# Patient Record
Sex: FEMALE | Race: BLACK OR AFRICAN AMERICAN | NOT HISPANIC OR LATINO
[De-identification: names, ages, dates, MRNs, and addresses within clinical notes are randomized per-mention and may not be internally consistent; named-entity substitution may affect disease eponyms.]

---

## 2017-04-25 ENCOUNTER — APPOINTMENT (OUTPATIENT)
Dept: VASCULAR SURGERY | Facility: CLINIC | Age: 72
End: 2017-04-25

## 2017-04-25 VITALS
RESPIRATION RATE: 14 BRPM | SYSTOLIC BLOOD PRESSURE: 201 MMHG | DIASTOLIC BLOOD PRESSURE: 97 MMHG | OXYGEN SATURATION: 96 % | HEART RATE: 72 BPM

## 2018-04-24 ENCOUNTER — APPOINTMENT (OUTPATIENT)
Dept: VASCULAR SURGERY | Facility: CLINIC | Age: 73
End: 2018-04-24

## 2018-05-08 ENCOUNTER — APPOINTMENT (OUTPATIENT)
Dept: VASCULAR SURGERY | Facility: CLINIC | Age: 73
End: 2018-05-08
Payer: MEDICARE

## 2018-05-08 PROCEDURE — 99213 OFFICE O/P EST LOW 20 MIN: CPT | Mod: 25

## 2018-05-08 PROCEDURE — 93880 EXTRACRANIAL BILAT STUDY: CPT

## 2018-06-04 ENCOUNTER — APPOINTMENT (OUTPATIENT)
Dept: HEART AND VASCULAR | Facility: CLINIC | Age: 73
End: 2018-06-04
Payer: MEDICARE

## 2018-06-04 VITALS
WEIGHT: 181 LBS | DIASTOLIC BLOOD PRESSURE: 70 MMHG | OXYGEN SATURATION: 97 % | BODY MASS INDEX: 29.09 KG/M2 | SYSTOLIC BLOOD PRESSURE: 132 MMHG | HEIGHT: 66 IN | TEMPERATURE: 98.1 F | HEART RATE: 70 BPM

## 2018-06-04 PROCEDURE — 99214 OFFICE O/P EST MOD 30 MIN: CPT | Mod: 25

## 2018-06-04 PROCEDURE — 93306 TTE W/DOPPLER COMPLETE: CPT | Mod: XE

## 2018-06-04 PROCEDURE — 93000 ELECTROCARDIOGRAM COMPLETE: CPT

## 2019-02-05 ENCOUNTER — APPOINTMENT (OUTPATIENT)
Dept: HEART AND VASCULAR | Facility: CLINIC | Age: 74
End: 2019-02-05
Payer: MEDICARE

## 2019-02-05 PROCEDURE — 99213 OFFICE O/P EST LOW 20 MIN: CPT

## 2019-05-07 ENCOUNTER — APPOINTMENT (OUTPATIENT)
Dept: VASCULAR SURGERY | Facility: CLINIC | Age: 74
End: 2019-05-07
Payer: MEDICARE

## 2019-05-07 VITALS — HEART RATE: 70 BPM | OXYGEN SATURATION: 96 % | DIASTOLIC BLOOD PRESSURE: 68 MMHG | SYSTOLIC BLOOD PRESSURE: 137 MMHG

## 2019-05-07 PROCEDURE — 93880 EXTRACRANIAL BILAT STUDY: CPT

## 2019-05-07 PROCEDURE — 99213 OFFICE O/P EST LOW 20 MIN: CPT

## 2019-05-13 NOTE — PROCEDURE
[FreeTextEntry1] : Carotid duplex reveals stable R moderate ICA stenosis, 50-69%, with ICA/CCA PSV ratio of 2.5\par L ICA <50% stenosis, stable

## 2019-05-13 NOTE — REVIEW OF SYSTEMS
[As Noted in HPI] : as noted in HPI [Negative] : Psychiatric [Fever] : no fever [Chills] : no chills [Eyesight Problems] : no eyesight problems [Loss Of Hearing] : no hearing loss [Chest Pain] : no chest pain [Palpitations] : no palpitations [Leg Claudication] : no intermittent leg claudication [Shortness Of Breath] : no shortness of breath [Wheezing] : no wheezing [Abdominal Pain] : no abdominal pain [Vomiting] : no vomiting [Arthralgias] : no arthralgias [Joint Pain] : no joint pain [Limb Pain] : no limb pain [Muscle Weakness] : no muscle weakness

## 2019-05-13 NOTE — PHYSICAL EXAM
[Normal Thyroid] : the thyroid was normal [Normal Breath Sounds] : Normal breath sounds [Normal Heart Sounds] : normal heart sounds [Normal Rate and Rhythm] : normal rate and rhythm [2+] : left 2+ [Alert] : alert [No Rash or Lesion] : No rash or lesion [Calm] : calm [JVD] : no jugular venous distention  [Carotid Bruits] : no carotid bruits [Right Carotid Bruit] : no bruit heard over the right carotid [Left Carotid Bruit] : no bruit heard over the left carotid [de-identified] : Well-nourished, NAD [de-identified] : NC/AT, barbaraictalbert, EOMIx6 [de-identified] : FROM throughout, strength 5/5x4 [de-identified] : CNII-XII grossly intact, CITLALLI grossly intact

## 2019-05-13 NOTE — HISTORY OF PRESENT ILLNESS
[FreeTextEntry1] : 72 yo F with HLD, HTN, and DMT2 here for f/u carotid duplex. In 4/2016, pt had moderate stenosis, 50-69%, of R carotid. Since then, she has been feeling well and compliant with medications. \par \par No extremity weakness, no paresthesias, no facial droop, no slurred speech, no syncope, no vision/hearing issues.

## 2019-05-13 NOTE — ASSESSMENT
[FreeTextEntry1] : 71yoF w/stable b/l ICA stenosis, asymptomatic, no changes in medications/activity, will f/u in 6mos for surveillance.\par \par I personally discussed this patient with the Physician Assistant at the time of the visit. I agree with the assessment and plan as written

## 2019-11-05 ENCOUNTER — APPOINTMENT (OUTPATIENT)
Dept: VASCULAR SURGERY | Facility: CLINIC | Age: 74
End: 2019-11-05
Payer: MEDICARE

## 2019-11-05 VITALS
HEART RATE: 66 BPM | WEIGHT: 180 LBS | HEIGHT: 65 IN | SYSTOLIC BLOOD PRESSURE: 179 MMHG | TEMPERATURE: 98.7 F | DIASTOLIC BLOOD PRESSURE: 71 MMHG | OXYGEN SATURATION: 95 % | BODY MASS INDEX: 29.99 KG/M2

## 2019-11-05 PROCEDURE — 93880 EXTRACRANIAL BILAT STUDY: CPT

## 2019-11-05 PROCEDURE — 99213 OFFICE O/P EST LOW 20 MIN: CPT

## 2019-11-11 NOTE — HISTORY OF PRESENT ILLNESS
[FreeTextEntry1] : 72 yo F with HLD, HTN, and DMT2 here for f/u carotid duplex.  Pt was noted to have L <50% ICA stenosis and R 50-69% ICA stenosis, without symptoms in 05/2019. Since that time, she has been feeling well and compliant with medications. \par \par No extremity weakness, no paresthesias, no facial droop, no slurred speech, no syncope, no vision/hearing issues.

## 2019-11-11 NOTE — ASSESSMENT
[FreeTextEntry1] : 71yoF w/stable b/l ICA stenosis, asymptomatic, no changes in medications/activity.  Duplex today reveals no significant change from 05/2019 duplex, will f/u in 6mos for surveillance.\par \par I personally discussed this patient with the Physician Assistant at the time of the visit. I agree with the assessment and plan as written

## 2019-11-11 NOTE — PROCEDURE
[FreeTextEntry1] : Carotid duplex reveals stable R moderate ICA stenosis, 50-69%, L ICA <50% stenosis, stable

## 2019-11-11 NOTE — PHYSICAL EXAM
[Normal Thyroid] : the thyroid was normal [Normal Breath Sounds] : Normal breath sounds [Normal Heart Sounds] : normal heart sounds [Normal Rate and Rhythm] : normal rate and rhythm [2+] : left 2+ [No Rash or Lesion] : No rash or lesion [Alert] : alert [Calm] : calm [JVD] : no jugular venous distention  [Right Carotid Bruit] : no bruit heard over the right carotid [Carotid Bruits] : no carotid bruits [Left Carotid Bruit] : no bruit heard over the left carotid [de-identified] : Well-nourished, NAD [de-identified] : NC/AT, barbaraictalbert, EOMIx6 [de-identified] : FROM throughout, strength 5/5x4 [de-identified] : CNII-XII grossly intact, CITLALLI grossly intact

## 2020-11-02 ENCOUNTER — APPOINTMENT (OUTPATIENT)
Dept: INTERNAL MEDICINE | Facility: CLINIC | Age: 75
End: 2020-11-02
Payer: MEDICARE

## 2020-11-02 VITALS
HEART RATE: 78 BPM | HEIGHT: 65 IN | WEIGHT: 186 LBS | BODY MASS INDEX: 30.99 KG/M2 | TEMPERATURE: 97.9 F | OXYGEN SATURATION: 98 % | SYSTOLIC BLOOD PRESSURE: 156 MMHG | DIASTOLIC BLOOD PRESSURE: 66 MMHG

## 2020-11-02 DIAGNOSIS — Z12.39 ENCOUNTER FOR OTHER SCREENING FOR MALIGNANT NEOPLASM OF BREAST: ICD-10-CM

## 2020-11-02 PROCEDURE — 99072 ADDL SUPL MATRL&STAF TM PHE: CPT

## 2020-11-02 PROCEDURE — G0439: CPT | Mod: GC

## 2020-11-03 ENCOUNTER — APPOINTMENT (OUTPATIENT)
Dept: VASCULAR SURGERY | Facility: CLINIC | Age: 75
End: 2020-11-03
Payer: MEDICARE

## 2020-11-03 PROCEDURE — 99213 OFFICE O/P EST LOW 20 MIN: CPT

## 2020-11-03 PROCEDURE — 99072 ADDL SUPL MATRL&STAF TM PHE: CPT

## 2020-11-03 PROCEDURE — 93880 EXTRACRANIAL BILAT STUDY: CPT

## 2020-11-09 NOTE — HISTORY OF PRESENT ILLNESS
[FreeTextEntry1] : 76 yo F with HLD, HTN, and DMT2 here for f/u carotid duplex.  Pt was noted to have L <50% ICA stenosis and R 50-69% ICA stenosis, without symptoms in 05/2019. Since that time, she has been feeling well and compliant with medications. \par \par No extremity weakness, no paresthesias, no facial droop, no slurred speech, no syncope, no vision/hearing issues.

## 2020-11-09 NOTE — ASSESSMENT
[FreeTextEntry1] : 75yoF w/stable b/l ICA stenosis, asymptomatic, no changes in medications/activity.  Duplex today reveals no significant change from 05/2019 duplex, will f/u in 6mos for surveillance.\par \par I personally discussed this patient with the Physician Assistant at the time of the visit. I agree with the assessment and plan as written

## 2020-11-09 NOTE — PHYSICAL EXAM
[Normal Thyroid] : the thyroid was normal [Normal Breath Sounds] : Normal breath sounds [Normal Heart Sounds] : normal heart sounds [Normal Rate and Rhythm] : normal rate and rhythm [2+] : left 2+ [No Rash or Lesion] : No rash or lesion [Alert] : alert [Calm] : calm [JVD] : no jugular venous distention  [Carotid Bruits] : no carotid bruits [Right Carotid Bruit] : no bruit heard over the right carotid [Left Carotid Bruit] : no bruit heard over the left carotid [de-identified] : Well-nourished, NAD [de-identified] : NC/AT, barbaraictalbert, EOMIx6 [de-identified] : FROM throughout, strength 5/5x4 [de-identified] : CNII-XII grossly intact, CITLALLI grossly intact

## 2020-11-23 ENCOUNTER — APPOINTMENT (OUTPATIENT)
Dept: HEART AND VASCULAR | Facility: CLINIC | Age: 75
End: 2020-11-23
Payer: MEDICARE

## 2020-11-23 VITALS
HEIGHT: 65 IN | DIASTOLIC BLOOD PRESSURE: 70 MMHG | BODY MASS INDEX: 30.82 KG/M2 | HEART RATE: 96 BPM | TEMPERATURE: 97.3 F | WEIGHT: 185 LBS | OXYGEN SATURATION: 97 % | SYSTOLIC BLOOD PRESSURE: 140 MMHG

## 2020-11-23 PROCEDURE — 99215 OFFICE O/P EST HI 40 MIN: CPT

## 2020-11-23 PROCEDURE — 93306 TTE W/DOPPLER COMPLETE: CPT

## 2020-11-23 PROCEDURE — 93000 ELECTROCARDIOGRAM COMPLETE: CPT

## 2020-11-23 NOTE — DISCUSSION/SUMMARY
[FreeTextEntry1] : LINDER r/o CAD f/u for nuclear stress\par AS- mild by echo results discuss\par RBBB stable\par HTN stable on meds\par Lipids- continue statin

## 2020-11-23 NOTE — PHYSICAL EXAM
[General Appearance - Well Developed] : well developed [Normal Appearance] : normal appearance [Well Groomed] : well groomed [General Appearance - Well Nourished] : well nourished [No Deformities] : no deformities [General Appearance - In No Acute Distress] : no acute distress [Normal Conjunctiva] : the conjunctiva exhibited no abnormalities [Eyelids - No Xanthelasma] : the eyelids demonstrated no xanthelasmas [Normal Oral Mucosa] : normal oral mucosa [No Oral Pallor] : no oral pallor [No Oral Cyanosis] : no oral cyanosis [Normal Jugular Venous A Waves Present] : normal jugular venous A waves present [Normal Jugular Venous V Waves Present] : normal jugular venous V waves present [No Jugular Venous Juarez A Waves] : no jugular venous juarez A waves [Respiration, Rhythm And Depth] : normal respiratory rhythm and effort [Exaggerated Use Of Accessory Muscles For Inspiration] : no accessory muscle use [Auscultation Breath Sounds / Voice Sounds] : lungs were clear to auscultation bilaterally [Heart Rate And Rhythm] : heart rate and rhythm were normal [Heart Sounds] : normal S1 and S2 [Abdomen Soft] : soft [Abdomen Tenderness] : non-tender [Abdomen Mass (___ Cm)] : no abdominal mass palpated [Abnormal Walk] : normal gait [Gait - Sufficient For Exercise Testing] : the gait was sufficient for exercise testing [Nail Clubbing] : no clubbing of the fingernails [Cyanosis, Localized] : no localized cyanosis [Petechial Hemorrhages (___cm)] : no petechial hemorrhages [] : no ischemic changes [Oriented To Time, Place, And Person] : oriented to person, place, and time [Affect] : the affect was normal [Mood] : the mood was normal [No Anxiety] : not feeling anxious

## 2020-11-23 NOTE — HISTORY OF PRESENT ILLNESS
[FreeTextEntry1] : sent by pcp c/o new LINDER for past month, worse stairs and hills, denies chest pain, but needs to staop and rest\par known AS mild previously\par ECH with RBBB\par HTN bp controlled\par Lipid on statin

## 2020-11-23 NOTE — REASON FOR VISIT
[Follow-Up - Clinic] : a clinic follow-up of [Abnormal ECG] : an abnormal ECG [Aortic Stenosis] : aortic stenosis [Dyspnea] : dyspnea [Hyperlipidemia] : hyperlipidemia [Hypertension] : hypertension

## 2020-11-23 NOTE — REVIEW OF SYSTEMS
[see HPI] : see HPI [Shortness Of Breath] : shortness of breath [Joint Pain] : joint pain [Negative] : Heme/Lymph

## 2021-02-08 ENCOUNTER — APPOINTMENT (OUTPATIENT)
Dept: HEART AND VASCULAR | Facility: CLINIC | Age: 76
End: 2021-02-08
Payer: MEDICARE

## 2021-02-08 VITALS
WEIGHT: 183 LBS | DIASTOLIC BLOOD PRESSURE: 58 MMHG | HEIGHT: 65 IN | TEMPERATURE: 98.4 F | BODY MASS INDEX: 30.49 KG/M2 | SYSTOLIC BLOOD PRESSURE: 140 MMHG | HEART RATE: 74 BPM | OXYGEN SATURATION: 97 %

## 2021-02-08 DIAGNOSIS — R94.31 ABNORMAL ELECTROCARDIOGRAM [ECG] [EKG]: ICD-10-CM

## 2021-02-08 PROCEDURE — 93321 DOPPLER ECHO F-UP/LMTD STD: CPT

## 2021-02-08 PROCEDURE — 99072 ADDL SUPL MATRL&STAF TM PHE: CPT

## 2021-02-08 PROCEDURE — 93325 DOPPLER ECHO COLOR FLOW MAPG: CPT

## 2021-02-08 PROCEDURE — 99215 OFFICE O/P EST HI 40 MIN: CPT

## 2021-02-08 PROCEDURE — 93350 STRESS TTE ONLY: CPT

## 2021-02-08 NOTE — PHYSICAL EXAM
[General Appearance - Well Developed] : well developed [Normal Appearance] : normal appearance [Well Groomed] : well groomed [General Appearance - Well Nourished] : well nourished [No Deformities] : no deformities [General Appearance - In No Acute Distress] : no acute distress [Eyelids - No Xanthelasma] : the eyelids demonstrated no xanthelasmas [Normal Conjunctiva] : the conjunctiva exhibited no abnormalities [Normal Oral Mucosa] : normal oral mucosa [No Oral Pallor] : no oral pallor [No Oral Cyanosis] : no oral cyanosis [Normal Jugular Venous A Waves Present] : normal jugular venous A waves present [Normal Jugular Venous V Waves Present] : normal jugular venous V waves present [No Jugular Venous Juarez A Waves] : no jugular venous juarez A waves [Respiration, Rhythm And Depth] : normal respiratory rhythm and effort [Exaggerated Use Of Accessory Muscles For Inspiration] : no accessory muscle use [Auscultation Breath Sounds / Voice Sounds] : lungs were clear to auscultation bilaterally [Heart Rate And Rhythm] : heart rate and rhythm were normal [Heart Sounds] : normal S1 and S2 [Abdomen Soft] : soft [Abdomen Tenderness] : non-tender [Abdomen Mass (___ Cm)] : no abdominal mass palpated [Gait - Sufficient For Exercise Testing] : the gait was sufficient for exercise testing [Nail Clubbing] : no clubbing of the fingernails [Cyanosis, Localized] : no localized cyanosis [Petechial Hemorrhages (___cm)] : no petechial hemorrhages [] : no ischemic changes [Oriented To Time, Place, And Person] : oriented to person, place, and time [Affect] : the affect was normal [Mood] : the mood was normal [No Anxiety] : not feeling anxious

## 2021-02-08 NOTE — HISTORY OF PRESENT ILLNESS
[FreeTextEntry1] : abnormal ecg- rbbb\par as/sob- for stress echo\par HTn bp controlled\par Lipids on statin

## 2021-02-18 ENCOUNTER — APPOINTMENT (OUTPATIENT)
Dept: INTERNAL MEDICINE | Facility: CLINIC | Age: 76
End: 2021-02-18

## 2021-02-26 ENCOUNTER — APPOINTMENT (OUTPATIENT)
Dept: INTERNAL MEDICINE | Facility: CLINIC | Age: 76
End: 2021-02-26
Payer: MEDICARE

## 2021-02-26 VITALS
BODY MASS INDEX: 30.49 KG/M2 | SYSTOLIC BLOOD PRESSURE: 150 MMHG | HEART RATE: 76 BPM | OXYGEN SATURATION: 98 % | RESPIRATION RATE: 15 BRPM | WEIGHT: 183 LBS | HEIGHT: 65 IN | TEMPERATURE: 97.7 F | DIASTOLIC BLOOD PRESSURE: 53 MMHG

## 2021-02-26 PROCEDURE — 99214 OFFICE O/P EST MOD 30 MIN: CPT | Mod: GC

## 2021-02-26 PROCEDURE — 99072 ADDL SUPL MATRL&STAF TM PHE: CPT

## 2021-07-09 ENCOUNTER — APPOINTMENT (OUTPATIENT)
Dept: INTERNAL MEDICINE | Facility: CLINIC | Age: 76
End: 2021-07-09

## 2021-07-19 ENCOUNTER — APPOINTMENT (OUTPATIENT)
Dept: INTERNAL MEDICINE | Facility: CLINIC | Age: 76
End: 2021-07-19
Payer: MEDICARE

## 2021-07-19 VITALS
WEIGHT: 184 LBS | BODY MASS INDEX: 30.66 KG/M2 | OXYGEN SATURATION: 99 % | TEMPERATURE: 97.6 F | HEART RATE: 74 BPM | HEIGHT: 65 IN | SYSTOLIC BLOOD PRESSURE: 151 MMHG | DIASTOLIC BLOOD PRESSURE: 61 MMHG

## 2021-07-19 DIAGNOSIS — S66.912A STRAIN OF UNSPECIFIED MUSCLE, FASCIA AND TENDON AT WRIST AND HAND LEVEL, LEFT HAND, INITIAL ENCOUNTER: ICD-10-CM

## 2021-07-19 PROCEDURE — 99214 OFFICE O/P EST MOD 30 MIN: CPT | Mod: GC

## 2021-11-02 ENCOUNTER — APPOINTMENT (OUTPATIENT)
Dept: VASCULAR SURGERY | Facility: CLINIC | Age: 76
End: 2021-11-02

## 2022-04-14 ENCOUNTER — APPOINTMENT (OUTPATIENT)
Dept: INTERNAL MEDICINE | Facility: CLINIC | Age: 77
End: 2022-04-14
Payer: MEDICARE

## 2022-04-14 VITALS
WEIGHT: 181 LBS | SYSTOLIC BLOOD PRESSURE: 157 MMHG | HEIGHT: 65 IN | OXYGEN SATURATION: 97 % | RESPIRATION RATE: 16 BRPM | BODY MASS INDEX: 30.16 KG/M2 | DIASTOLIC BLOOD PRESSURE: 66 MMHG | TEMPERATURE: 97.6 F | HEART RATE: 74 BPM

## 2022-04-14 DIAGNOSIS — K21.9 GASTRO-ESOPHAGEAL REFLUX DISEASE W/OUT ESOPHAGITIS: ICD-10-CM

## 2022-04-14 DIAGNOSIS — Z00.00 ENCOUNTER FOR GENERAL ADULT MEDICAL EXAMINATION W/OUT ABNORMAL FINDINGS: ICD-10-CM

## 2022-04-14 PROCEDURE — 99214 OFFICE O/P EST MOD 30 MIN: CPT | Mod: GC

## 2022-04-19 ENCOUNTER — APPOINTMENT (OUTPATIENT)
Dept: VASCULAR SURGERY | Facility: CLINIC | Age: 77
End: 2022-04-19
Payer: MEDICARE

## 2022-04-19 VITALS
WEIGHT: 181 LBS | BODY MASS INDEX: 30.16 KG/M2 | HEIGHT: 65 IN | SYSTOLIC BLOOD PRESSURE: 153 MMHG | HEART RATE: 56 BPM | DIASTOLIC BLOOD PRESSURE: 81 MMHG

## 2022-04-19 PROCEDURE — 93880 EXTRACRANIAL BILAT STUDY: CPT

## 2022-04-19 PROCEDURE — 99214 OFFICE O/P EST MOD 30 MIN: CPT

## 2022-04-23 NOTE — HISTORY OF PRESENT ILLNESS
[FreeTextEntry1] : 76yoF w/h/o HLD, HTN, and DMT2 here for f/u carotid duplex.  Pt was previously noted to have L <50% ICA stenosis and R 50-69% ICA stenosis, never demonstrating symptoms. Since that time, she has been feeling well and compliant with medications. \par \par No extremity weakness, no paresthesias, no facial droop, no slurred speech, no syncope, no vision/hearing issues.

## 2022-04-23 NOTE — ASSESSMENT
[FreeTextEntry1] : 76yoF w/h/o HLD, HTN, and DMT2 here for f/u carotid duplex.  Pt was previously noted to have L <50% ICA stenosis and R 50-69% ICA stenosis, never demonstrating symptoms. Since that time, she has been feeling well and compliant with medications. \par Carotid duplex today reveals progression of  R ICA stenosis to 70% (369/86).\par Patient was explained that her right carotid stenosis significantly progressed over last year and a half and needs intervention.\par She was recommended to see her cardiologist, Dr. Barron for an evaluation prior to carotid intervention.\par Once she is cleared we recommend to proceed with Carotid Endarterectomy vs TCAR procedure. RAB were discussed. Patient understands and agrees, and will schedule an appointment with her cardiologist.\par \par

## 2022-04-23 NOTE — PROCEDURE
[FreeTextEntry1] : Carotid duplex reveals progression of  R ICA stenosis to 70% (369/86), previously 11/2020 195/39; L ICA <50% stenosis, stable.

## 2022-04-23 NOTE — REVIEW OF SYSTEMS
[Fever] : no fever [Chills] : no chills [Eyesight Problems] : no eyesight problems [Loss Of Hearing] : no hearing loss [Chest Pain] : no chest pain [Palpitations] : no palpitations [Leg Claudication] : no intermittent leg claudication [Shortness Of Breath] : no shortness of breath [Wheezing] : no wheezing [Abdominal Pain] : no abdominal pain [Vomiting] : no vomiting [Arthralgias] : no arthralgias [Limb Pain] : no limb pain [Muscle Weakness] : no muscle weakness

## 2022-04-23 NOTE — PHYSICAL EXAM
[Carotid Bruits] : carotid bruit  [Right Carotid Bruit] : right carotid bruit heard [JVD] : no jugular venous distention  [Left Carotid Bruit] : no bruit heard over the left carotid [de-identified] : Well-nourished, NAD [de-identified] : NC/AT, barbaraictalbert, EOMIx6 [de-identified] : FROM throughout, strength 5/5x4 [de-identified] : CNII-XII grossly intact, CITLALLI grossly intact

## 2022-04-23 NOTE — ADDENDUM
[FreeTextEntry1] : This note was written by Brii GO, acting as a scribe for Dr. Madhuri Felipe.  I, Dr. Madhuri Felipe, have read and attest that all the information, medical decision-making, and discharge instructions within are true and accurate.\par \par I, Dr. Madhuri Felipe, personally performed the evaluation and management (E/M) services for this established patient who presents today with (a) new problem(s)/exacerbation of (an) existing condition(s).  That E/M includes conducting the examination, assessing all new/exacerbated conditions, and establishing a new plan of care.  Today, my ACP, Brii GO, was here to observe my evaluation and management services for this new problem/exacerbated condition to be followed going forward.\par \par \par

## 2022-07-11 ENCOUNTER — APPOINTMENT (OUTPATIENT)
Dept: INTERNAL MEDICINE | Facility: CLINIC | Age: 77
End: 2022-07-11

## 2022-07-11 VITALS
HEART RATE: 99 BPM | WEIGHT: 176 LBS | BODY MASS INDEX: 29.32 KG/M2 | SYSTOLIC BLOOD PRESSURE: 162 MMHG | RESPIRATION RATE: 16 BRPM | DIASTOLIC BLOOD PRESSURE: 82 MMHG | OXYGEN SATURATION: 100 % | HEIGHT: 65 IN

## 2022-07-11 DIAGNOSIS — E65 LOCALIZED ADIPOSITY: ICD-10-CM

## 2022-07-11 PROCEDURE — 99215 OFFICE O/P EST HI 40 MIN: CPT | Mod: GC

## 2022-07-12 PROBLEM — E65 AXILLARY FAT PAD: Status: ACTIVE | Noted: 2022-04-14

## 2022-07-12 NOTE — END OF VISIT
[] : Resident [FreeTextEntry3] : Here for follow up\par Axillary fat pad- notes that it is still painful, will obtain US as discussed last visit \par L eye blurry vision- rec ophtho eval, cannot rule out worsening carotid disease, plan for CEA, needs cards optimization, will make appt\par DM- managed by nephro, had labs she brought in with her which show A1C increased to 8.6, rec increasing glipizide-metformin , will discuss with her nephrologist \par HTN- Still above goal, compliant with medications, wants to discuss with her nephrologist re bp management

## 2022-07-12 NOTE — PHYSICAL EXAM
[No Edema] : there was no peripheral edema [Normal] : affect was normal and insight and judgment were intact [de-identified] : Bilateral axillary fat pads, right larger than left. No erythema, fluctuance. No tenderness to palpation or masses palpable.

## 2022-07-12 NOTE — ASSESSMENT
[FreeTextEntry1] : \par 76F w/ PMHx HTN, HLD, DM2, CKD3 (f/w nephrology Dr. Pearson at [facility]), and right ICA stenosis (70%, pending CEA vs. TCAR), presents for 3-month follow-up visit, complaining of 3-month h/o continued right-sided axillary fat pad pain and new 2-month h/o left eye blurry vision. \par \par #Right axillary fat pad\par - H/o bilateral axillary fat pads for over 40 years according to patient but just a few months ago the right side started to be painful, especially when walking her dog\par - Stable since last visit 3 months ago\par - Ordered bilateral axillary ultrasound\par \par #Left eye blurry vision\par - Has worn glasses since childhood, but 2 months ago she noticed her left eye vision was more blurry, both near and far sight\par - Cannot remember timing of her last optometry exam\par - No eye pain, no floaters, no black out episodes. No history of amaurosis fugax. \par - Unclear cause, must rule out carotid artery cause of worsening vision from plaque blockage\par - Pending cardiology pre-op clearance before CEA vs. TCAR for right ICA stenosis of 70%\par - Recommend optometry exam to rule out retinal pathology\par \par #Right ICA stenosis\par - Cardiology referral for pre-op clearance for CEA vs. TCAR with vascular surgery\par - Blurry vision in left eye could be related\par - Otherwise asymptomatic stenosis\par \par #HTN\par - BP continues to be uncontrolled, 162/90 today\par - Compliant with her 5 bp medications: amlodipine 10, atenolol 50, lasix 40, losartan 100, and terazosin 10\par - Patient is physically active, going for walks 3 times per day and riding her stationary bike at home.\par - Does not check bp at home\par - Bp medications managed by nephrologist at [facility]\par - Recommend switching lasix to HCTZ\par \par #DM2\par - Compliant with home glipizide-metformin 2.5-500 BID and levemir 30u qhs\par - Medications managed by nephrologist at [facility]\par - Recommend increasing glipizide-metformin to 5-1000 BID\par \par #HLD\par - C/w home fenofibrate 145 and lovastatin 40

## 2022-07-12 NOTE — HISTORY OF PRESENT ILLNESS
[de-identified] : 76F w/ PMHx HTN, HLD, DM2, CKD3 (f/w nephrology Dr. Pearson at [facility]), and right carotid stenosis (70%, pending CEA vs. TCAR), presents for 3-month follow-up visit, complaining of continued right-side axillary fat pad pain and new 2-month h/o left eye blurry vision. Patient has been staying physically active, going for walks 3 times per day and riding her stationary bike at home. Reports good compliance with her blood pressure, cholesterol, and diabetes medications. \par \par Right axillary fat pad- She has had bilateral axillary fat pads for over 40 years according to patient but just a few months ago the right side started to be painful, especially when walking her dog. Has not increased in size or pain since last visit 3 months ago.\par \par Left eye blurry vision- has worn glasses since childhood, but 2 months ago she noticed her left eye vision was more blurry, both near and far. Cannot remember when her last optometry exam was. No eye pain, no floaters, no black out episodes. No history of amaurosis fugax.

## 2022-08-04 ENCOUNTER — APPOINTMENT (OUTPATIENT)
Dept: ULTRASOUND IMAGING | Facility: HOSPITAL | Age: 77
End: 2022-08-04

## 2022-08-04 ENCOUNTER — RESULT REVIEW (OUTPATIENT)
Age: 77
End: 2022-08-04

## 2022-08-04 ENCOUNTER — OUTPATIENT (OUTPATIENT)
Dept: OUTPATIENT SERVICES | Facility: HOSPITAL | Age: 77
LOS: 1 days | End: 2022-08-04
Payer: COMMERCIAL

## 2022-08-04 PROCEDURE — 76642 ULTRASOUND BREAST LIMITED: CPT | Mod: 26,50

## 2022-08-04 PROCEDURE — 76642 ULTRASOUND BREAST LIMITED: CPT

## 2022-08-18 ENCOUNTER — OUTPATIENT (OUTPATIENT)
Dept: OUTPATIENT SERVICES | Facility: HOSPITAL | Age: 77
LOS: 1 days | End: 2022-08-18
Payer: COMMERCIAL

## 2022-08-18 ENCOUNTER — RESULT REVIEW (OUTPATIENT)
Age: 77
End: 2022-08-18

## 2022-08-18 ENCOUNTER — APPOINTMENT (OUTPATIENT)
Dept: MAMMOGRAPHY | Facility: HOSPITAL | Age: 77
End: 2022-08-18

## 2022-08-18 PROCEDURE — 76642 ULTRASOUND BREAST LIMITED: CPT | Mod: 26,RT

## 2022-08-18 PROCEDURE — 77063 BREAST TOMOSYNTHESIS BI: CPT

## 2022-08-18 PROCEDURE — 77067 SCR MAMMO BI INCL CAD: CPT

## 2022-08-18 PROCEDURE — 77067 SCR MAMMO BI INCL CAD: CPT | Mod: 26

## 2022-08-18 PROCEDURE — 76642 ULTRASOUND BREAST LIMITED: CPT

## 2022-08-18 PROCEDURE — 77063 BREAST TOMOSYNTHESIS BI: CPT | Mod: 26

## 2022-09-08 ENCOUNTER — APPOINTMENT (OUTPATIENT)
Dept: HEART AND VASCULAR | Facility: CLINIC | Age: 77
End: 2022-09-08

## 2022-09-08 PROCEDURE — 99214 OFFICE O/P EST MOD 30 MIN: CPT | Mod: 25

## 2022-09-08 PROCEDURE — 93306 TTE W/DOPPLER COMPLETE: CPT

## 2022-09-08 PROCEDURE — 93015 CV STRESS TEST SUPVJ I&R: CPT

## 2022-09-08 NOTE — PHYSICAL EXAM
[Well Developed] : well developed [Well Nourished] : well nourished [No Acute Distress] : no acute distress [Normal Conjunctiva] : normal conjunctiva [Normal Venous Pressure] : normal venous pressure [No Carotid Bruit] : no carotid bruit [Normal S1, S2] : normal S1, S2 [No Rub] : no rub [No Gallop] : no gallop [Murmur] : murmur [Clear Lung Fields] : clear lung fields [Good Air Entry] : good air entry [No Respiratory Distress] : no respiratory distress  [Soft] : abdomen soft [Non Tender] : non-tender [No Masses/organomegaly] : no masses/organomegaly [Normal Bowel Sounds] : normal bowel sounds [Gait - Sufficient for Exercise Testing] : gait - sufficient for exercise testing [No Edema] : no edema [No Cyanosis] : no cyanosis [No Clubbing] : no clubbing [No Rash] : no rash [Moves all extremities] : moves all extremities [No Focal Deficits] : no focal deficits [Normal Speech] : normal speech [Alert and Oriented] : alert and oriented [Normal memory] : normal memory [de-identified] : 2/6

## 2022-09-08 NOTE — DISCUSSION/SUMMARY
[FreeTextEntry1] : stable exam\par ecg baseline RBBB\par AS mild, normal LVEF by echo/ normal stress\par negative non invasive cardiac evaluation\par no cardiac contra indication to planned procedure

## 2022-10-03 ENCOUNTER — APPOINTMENT (OUTPATIENT)
Dept: INTERNAL MEDICINE | Facility: CLINIC | Age: 77
End: 2022-10-03

## 2022-10-03 VITALS
BODY MASS INDEX: 29.66 KG/M2 | DIASTOLIC BLOOD PRESSURE: 71 MMHG | OXYGEN SATURATION: 99 % | HEIGHT: 65 IN | WEIGHT: 178 LBS | TEMPERATURE: 96.3 F | SYSTOLIC BLOOD PRESSURE: 188 MMHG | HEART RATE: 66 BPM

## 2022-10-03 VITALS — SYSTOLIC BLOOD PRESSURE: 168 MMHG | DIASTOLIC BLOOD PRESSURE: 78 MMHG

## 2022-10-03 PROCEDURE — 99213 OFFICE O/P EST LOW 20 MIN: CPT | Mod: GC

## 2022-10-11 ENCOUNTER — APPOINTMENT (OUTPATIENT)
Dept: INTERNAL MEDICINE | Facility: CLINIC | Age: 77
End: 2022-10-11

## 2022-10-18 ENCOUNTER — APPOINTMENT (OUTPATIENT)
Dept: VASCULAR SURGERY | Facility: CLINIC | Age: 77
End: 2022-10-18

## 2022-10-18 PROCEDURE — 99213 OFFICE O/P EST LOW 20 MIN: CPT

## 2022-10-18 PROCEDURE — 93880 EXTRACRANIAL BILAT STUDY: CPT

## 2022-10-24 NOTE — PHYSICAL EXAM
[Normal Thyroid] : the thyroid was normal [Carotid Bruits] : carotid bruit  [Normal Breath Sounds] : Normal breath sounds [Normal Heart Sounds] : normal heart sounds [Normal Rate and Rhythm] : normal rate and rhythm [Right Carotid Bruit] : right carotid bruit heard [2+] : left 2+ [No Rash or Lesion] : No rash or lesion [Alert] : alert [Calm] : calm [JVD] : no jugular venous distention  [Left Carotid Bruit] : no bruit heard over the left carotid [de-identified] : Well-nourished, NAD [de-identified] : NC/AT, barbaraictalbert, EOMIx6 [de-identified] : FROM throughout, strength 5/5x4 [de-identified] : CNII-XII grossly intact, CITLALLI grossly intact

## 2022-10-24 NOTE — PROCEDURE
[FreeTextEntry1] : Carotid duplex reveals stable  R ICA stenosis at 70% (velocity 388/45cm/sec), previously 369/86cm/sec; L ICA <50% stenosis stable.

## 2022-10-24 NOTE — HISTORY OF PRESENT ILLNESS
[FreeTextEntry1] : 77yoF w/h/o HLD, HTN, and DMT2 here for f/u carotid duplex.  Pt was previously noted to have L <50% ICA stenosis and R >70% ICA stenosis, never demonstrating symptoms. Since that time, she has been feeling well and compliant with medications. No extremity weakness, no paresthesias, no facial droop, no slurred speech, no syncope, no vision/hearing issues.

## 2022-10-24 NOTE — REVIEW OF SYSTEMS
[As Noted in HPI] : as noted in HPI [Negative] : Psychiatric [Fever] : no fever [Chills] : no chills [Eyesight Problems] : no eyesight problems [Loss Of Hearing] : no hearing loss [Chest Pain] : no chest pain [Palpitations] : no palpitations [Leg Claudication] : no intermittent leg claudication [Shortness Of Breath] : no shortness of breath [Wheezing] : no wheezing [Abdominal Pain] : no abdominal pain [Vomiting] : no vomiting [Arthralgias] : no arthralgias [Limb Pain] : no limb pain [Muscle Weakness] : no muscle weakness

## 2022-10-24 NOTE — ASSESSMENT
[FreeTextEntry1] : 77yoF w/h/o HLD, HTN, and DMT2 here for f/u carotid duplex as her disease progressed from November 2021 to April 2022.  Still asymptomatic, no medication changes since last visit.  She was seen by Dr. Barron in September 2022 and he deemed her optimized and cleared from carotid surgery.\par \par Normal neuro exam today, and Carotid duplex reveals stable  R ICA stenosis at 70% (velocity 388/45cm/sec), previously 369/86cm/sec; L ICA <50% stenosis stable.  Discussed options for observation of her carotid arteries and 6mo f/u v revascularization of the R ICA.  Will schedule pt for R ICA PTA/stent w/TCAR as she was cleared by Dr. Barron in September, but pt first needs to see Dr. Pearson (nephro) for reevaluation of her CKD prior to surgery; will select a surgical date after her speaking w/Lili.

## 2022-10-25 VITALS
RESPIRATION RATE: 18 BRPM | OXYGEN SATURATION: 97 % | WEIGHT: 178.57 LBS | HEART RATE: 72 BPM | HEIGHT: 63 IN | SYSTOLIC BLOOD PRESSURE: 196 MMHG | DIASTOLIC BLOOD PRESSURE: 75 MMHG | TEMPERATURE: 97 F

## 2022-10-25 NOTE — PRE-OP CHECKLIST - 2.
pt's BP on arrival 196/75 left arm, repeated BP after 20 minutes 176/74 left arm, 194/78 right arm, Anesthesiologist Dr Milligan made aware.

## 2022-10-26 ENCOUNTER — RESULT REVIEW (OUTPATIENT)
Age: 77
End: 2022-10-26

## 2022-10-26 ENCOUNTER — INPATIENT (INPATIENT)
Facility: HOSPITAL | Age: 77
LOS: 0 days | Discharge: HOME | DRG: 39 | End: 2022-10-27
Attending: SURGERY | Admitting: SURGERY
Payer: COMMERCIAL

## 2022-10-26 ENCOUNTER — NON-APPOINTMENT (OUTPATIENT)
Age: 77
End: 2022-10-26

## 2022-10-26 ENCOUNTER — APPOINTMENT (OUTPATIENT)
Dept: VASCULAR SURGERY | Facility: HOSPITAL | Age: 77
End: 2022-10-26

## 2022-10-26 ENCOUNTER — TRANSCRIPTION ENCOUNTER (OUTPATIENT)
Age: 77
End: 2022-10-26

## 2022-10-26 DIAGNOSIS — D25.9 LEIOMYOMA OF UTERUS, UNSPECIFIED: Chronic | ICD-10-CM

## 2022-10-26 DIAGNOSIS — I65.29 OCCLUSION AND STENOSIS OF UNSPECIFIED CAROTID ARTERY: Chronic | ICD-10-CM

## 2022-10-26 DIAGNOSIS — Z90.710 ACQUIRED ABSENCE OF BOTH CERVIX AND UTERUS: Chronic | ICD-10-CM

## 2022-10-26 LAB
ANION GAP SERPL CALC-SCNC: 11 MMOL/L — SIGNIFICANT CHANGE UP (ref 5–17)
ANION GAP SERPL CALC-SCNC: 9 MMOL/L — SIGNIFICANT CHANGE UP (ref 5–17)
APTT BLD: 32 SEC — SIGNIFICANT CHANGE UP (ref 27.5–35.5)
BLD GP AB SCN SERPL QL: NEGATIVE — SIGNIFICANT CHANGE UP
BUN SERPL-MCNC: 19 MG/DL — SIGNIFICANT CHANGE UP (ref 7–23)
BUN SERPL-MCNC: 20 MG/DL — SIGNIFICANT CHANGE UP (ref 7–23)
CALCIUM SERPL-MCNC: 10.5 MG/DL — SIGNIFICANT CHANGE UP (ref 8.4–10.5)
CALCIUM SERPL-MCNC: 9.5 MG/DL — SIGNIFICANT CHANGE UP (ref 8.4–10.5)
CHLORIDE SERPL-SCNC: 111 MMOL/L — HIGH (ref 96–108)
CHLORIDE SERPL-SCNC: 111 MMOL/L — HIGH (ref 96–108)
CO2 SERPL-SCNC: 21 MMOL/L — LOW (ref 22–31)
CO2 SERPL-SCNC: 27 MMOL/L — SIGNIFICANT CHANGE UP (ref 22–31)
CREAT SERPL-MCNC: 1.34 MG/DL — HIGH (ref 0.5–1.3)
CREAT SERPL-MCNC: 1.48 MG/DL — HIGH (ref 0.5–1.3)
EGFR: 36 ML/MIN/1.73M2 — LOW
EGFR: 40 ML/MIN/1.73M2 — LOW
GLUCOSE BLDC GLUCOMTR-MCNC: 104 MG/DL — HIGH (ref 70–99)
GLUCOSE BLDC GLUCOMTR-MCNC: 118 MG/DL — HIGH (ref 70–99)
GLUCOSE BLDC GLUCOMTR-MCNC: 132 MG/DL — HIGH (ref 70–99)
GLUCOSE BLDC GLUCOMTR-MCNC: 155 MG/DL — HIGH (ref 70–99)
GLUCOSE BLDC GLUCOMTR-MCNC: 155 MG/DL — HIGH (ref 70–99)
GLUCOSE SERPL-MCNC: 115 MG/DL — HIGH (ref 70–99)
GLUCOSE SERPL-MCNC: 119 MG/DL — HIGH (ref 70–99)
HCT VFR BLD CALC: 25.4 % — LOW (ref 34.5–45)
HGB BLD-MCNC: 8.6 G/DL — LOW (ref 11.5–15.5)
INR BLD: 0.94 — SIGNIFICANT CHANGE UP (ref 0.88–1.16)
ISTAT ACTK (ACTIVATED CLOTTING TIME KAOLIN): 138 SEC — HIGH (ref 74–137)
ISTAT ACTK (ACTIVATED CLOTTING TIME KAOLIN): 219 SEC — HIGH (ref 74–137)
ISTAT ACTK (ACTIVATED CLOTTING TIME KAOLIN): 231 SEC — HIGH (ref 74–137)
ISTAT ACTK (ACTIVATED CLOTTING TIME KAOLIN): 237 SEC — HIGH (ref 74–137)
MAGNESIUM SERPL-MCNC: 1.7 MG/DL — SIGNIFICANT CHANGE UP (ref 1.6–2.6)
MCHC RBC-ENTMCNC: 26.8 PG — LOW (ref 27–34)
MCHC RBC-ENTMCNC: 33.9 GM/DL — SIGNIFICANT CHANGE UP (ref 32–36)
MCV RBC AUTO: 79.1 FL — LOW (ref 80–100)
NRBC # BLD: 0 /100 WBCS — SIGNIFICANT CHANGE UP (ref 0–0)
PHOSPHATE SERPL-MCNC: 2.6 MG/DL — SIGNIFICANT CHANGE UP (ref 2.5–4.5)
PLATELET # BLD AUTO: 243 K/UL — SIGNIFICANT CHANGE UP (ref 150–400)
POTASSIUM SERPL-MCNC: 5 MMOL/L — SIGNIFICANT CHANGE UP (ref 3.5–5.3)
POTASSIUM SERPL-MCNC: 5.8 MMOL/L — HIGH (ref 3.5–5.3)
POTASSIUM SERPL-SCNC: 5 MMOL/L — SIGNIFICANT CHANGE UP (ref 3.5–5.3)
POTASSIUM SERPL-SCNC: 5.8 MMOL/L — HIGH (ref 3.5–5.3)
PROTHROM AB SERPL-ACNC: 11.2 SEC — SIGNIFICANT CHANGE UP (ref 10.5–13.4)
RBC # BLD: 3.21 M/UL — LOW (ref 3.8–5.2)
RBC # FLD: 15.9 % — HIGH (ref 10.3–14.5)
RH IG SCN BLD-IMP: POSITIVE — SIGNIFICANT CHANGE UP
SODIUM SERPL-SCNC: 143 MMOL/L — SIGNIFICANT CHANGE UP (ref 135–145)
SODIUM SERPL-SCNC: 147 MMOL/L — HIGH (ref 135–145)
WBC # BLD: 8.02 K/UL — SIGNIFICANT CHANGE UP (ref 3.8–10.5)
WBC # FLD AUTO: 8.02 K/UL — SIGNIFICANT CHANGE UP (ref 3.8–10.5)

## 2022-10-26 PROCEDURE — 88311 DECALCIFY TISSUE: CPT | Mod: 26

## 2022-10-26 PROCEDURE — 88304 TISSUE EXAM BY PATHOLOGIST: CPT | Mod: 26

## 2022-10-26 PROCEDURE — 35301 RECHANNELING OF ARTERY: CPT | Mod: RT

## 2022-10-26 DEVICE — CAROTID SHUNT INTERNAL 3MM X 5MM SPRING REINFORCED: Type: IMPLANTABLE DEVICE | Status: FUNCTIONAL

## 2022-10-26 DEVICE — SHEATH INTRODUCER TERUMO PINNACLE CORONARY 5FR X 10CM X 0.038" MINI WIRE: Type: IMPLANTABLE DEVICE | Status: FUNCTIONAL

## 2022-10-26 DEVICE — PATCH PERICARDIAL PHOTOFIX .8X8CM: Type: IMPLANTABLE DEVICE | Status: FUNCTIONAL

## 2022-10-26 DEVICE — SURGICEL FIBRILLAR 4 X 4": Type: IMPLANTABLE DEVICE | Status: FUNCTIONAL

## 2022-10-26 DEVICE — CATH ANG BERENSTN 5FRX65CM: Type: IMPLANTABLE DEVICE | Status: FUNCTIONAL

## 2022-10-26 DEVICE — SURGICEL 4 X 8": Type: IMPLANTABLE DEVICE | Status: FUNCTIONAL

## 2022-10-26 DEVICE — INTRO MICROPUNC 5FRX10CM SS: Type: IMPLANTABLE DEVICE | Status: FUNCTIONAL

## 2022-10-26 DEVICE — SURGIFLO HEMOSTATIC MATRIX KIT: Type: IMPLANTABLE DEVICE | Status: FUNCTIONAL

## 2022-10-26 DEVICE — GWIRE BENTSON 0.035INX180CM: Type: IMPLANTABLE DEVICE | Status: FUNCTIONAL

## 2022-10-26 RX ORDER — AMLODIPINE BESYLATE 2.5 MG/1
10 TABLET ORAL DAILY
Refills: 0 | Status: DISCONTINUED | OUTPATIENT
Start: 2022-10-27 | End: 2022-10-27

## 2022-10-26 RX ORDER — INSULIN LISPRO 100/ML
VIAL (ML) SUBCUTANEOUS
Refills: 0 | Status: DISCONTINUED | OUTPATIENT
Start: 2022-10-26 | End: 2022-10-27

## 2022-10-26 RX ORDER — SODIUM CHLORIDE 9 MG/ML
1000 INJECTION, SOLUTION INTRAVENOUS
Refills: 0 | Status: DISCONTINUED | OUTPATIENT
Start: 2022-10-26 | End: 2022-10-27

## 2022-10-26 RX ORDER — GLUCAGON INJECTION, SOLUTION 0.5 MG/.1ML
1 INJECTION, SOLUTION SUBCUTANEOUS ONCE
Refills: 0 | Status: DISCONTINUED | OUTPATIENT
Start: 2022-10-26 | End: 2022-10-27

## 2022-10-26 RX ORDER — DEXTROSE 50 % IN WATER 50 %
25 SYRINGE (ML) INTRAVENOUS ONCE
Refills: 0 | Status: DISCONTINUED | OUTPATIENT
Start: 2022-10-26 | End: 2022-10-27

## 2022-10-26 RX ORDER — ASPIRIN/CALCIUM CARB/MAGNESIUM 324 MG
81 TABLET ORAL DAILY
Refills: 0 | Status: DISCONTINUED | OUTPATIENT
Start: 2022-10-26 | End: 2022-10-27

## 2022-10-26 RX ORDER — MAGNESIUM SULFATE 500 MG/ML
1 VIAL (ML) INJECTION ONCE
Refills: 0 | Status: COMPLETED | OUTPATIENT
Start: 2022-10-26 | End: 2022-10-26

## 2022-10-26 RX ORDER — DOXAZOSIN MESYLATE 4 MG
8 TABLET ORAL AT BEDTIME
Refills: 0 | Status: DISCONTINUED | OUTPATIENT
Start: 2022-10-26 | End: 2022-10-27

## 2022-10-26 RX ORDER — ATENOLOL 25 MG/1
50 TABLET ORAL DAILY
Refills: 0 | Status: DISCONTINUED | OUTPATIENT
Start: 2022-10-27 | End: 2022-10-27

## 2022-10-26 RX ORDER — CEFAZOLIN SODIUM 1 G
2000 VIAL (EA) INJECTION EVERY 8 HOURS
Refills: 0 | Status: DISCONTINUED | OUTPATIENT
Start: 2022-10-26 | End: 2022-10-27

## 2022-10-26 RX ORDER — DEXTROSE 50 % IN WATER 50 %
12.5 SYRINGE (ML) INTRAVENOUS ONCE
Refills: 0 | Status: DISCONTINUED | OUTPATIENT
Start: 2022-10-26 | End: 2022-10-27

## 2022-10-26 RX ORDER — DEXTROSE 50 % IN WATER 50 %
15 SYRINGE (ML) INTRAVENOUS ONCE
Refills: 0 | Status: DISCONTINUED | OUTPATIENT
Start: 2022-10-26 | End: 2022-10-27

## 2022-10-26 RX ADMIN — Medication 81 MILLIGRAM(S): at 14:54

## 2022-10-26 RX ADMIN — Medication 2: at 17:40

## 2022-10-26 RX ADMIN — Medication 100 GRAM(S): at 18:07

## 2022-10-26 RX ADMIN — Medication 100 MILLIGRAM(S): at 20:53

## 2022-10-26 RX ADMIN — Medication 8 MILLIGRAM(S): at 22:55

## 2022-10-26 NOTE — PROGRESS NOTE ADULT - SUBJECTIVE AND OBJECTIVE BOX
Subjective: Patient seen and examined for postop check. No new complaints. Minor pain at incision site, but well controlled.    ROS:   Denies Headache, blurred vision, Chest Pain, SOB, Abdominal pain, nausea or vomiting     Social   ceFAZolin   IVPB 2000  aspirin  chewable 81  ceFAZolin   IVPB 2000  doxazosin 8      Allergies    No Known Allergies    Intolerances        Vital Signs Last 24 Hrs  T(C): 36.2 (26 Oct 2022 11:59), Max: 36.2 (26 Oct 2022 11:59)  T(F): --  HR: 65 (26 Oct 2022 15:35) (65 - 72)  BP: 143/63 (26 Oct 2022 15:35) (132/60 - 176/74)  BP(mean): 90 (26 Oct 2022 15:35) (86 - 99)  RR: 20 (26 Oct 2022 15:35) (16 - 20)  SpO2: 96% (26 Oct 2022 15:35) (94% - 97%)    Parameters below as of 26 Oct 2022 15:35  Patient On (Oxygen Delivery Method): nasal cannula  O2 Flow (L/min): 2    I&O's Summary      Physical Exam:  General: nad  HEENT: R incision with dime size serous strikethrough, otherwise c/d/i  Pulmonary: nc  Cardiovascular: rrr  Abdominal: s nt nd  Extremities: wwp  Neuro: CN II-XII intact. No deficits in strength or sensation.      LABS:                        8.6    8.02  )-----------( 243      ( 26 Oct 2022 14:13 )             25.4     10-26    143  |  111<H>  |  19  ----------------------------<  119<H>  5.0   |  21<L>  |  1.34<H>    Ca    9.5      26 Oct 2022 14:13  Phos  2.6     10-26  Mg     1.7     10-26      PT/INR - ( 26 Oct 2022 09:36 )   PT: 11.2 sec;   INR: 0.94          PTT - ( 26 Oct 2022 09:36 )  PTT:32.0 sec      A/P:  77F with PMH of HTN, HLD, DM, presents for R TCAR. Patient was being followed for known R carotid disease, with recent duplex showing ~70% stenosis (388/45 cm/s). POD0 s/p R CEA.      Vascular/PAD:  -ASA    HTN/HLD:  -amlodipine, atenolol    DM:  ISS    Diet: CLD    Dispo: tele

## 2022-10-26 NOTE — H&P ADULT - NSHPPHYSICALEXAM_GEN_ALL_CORE
Gen: NAD  Resp: Normal respiratory effort  Abd: Soft, NTND  Ext: WWP  Neuro: A&Ox3, motor/sensation grossly intact

## 2022-10-26 NOTE — H&P ADULT - HISTORY OF PRESENT ILLNESS
77F with PMH of HTN, HLD, DM, presents for R TCAR. Patient was being followed for known R carotid disease, with recent duplex showing ~70% stenosis (388/45 cm/s). Patient remains neurologically asymptomatic. ROS otherwise negative.

## 2022-10-26 NOTE — BRIEF OPERATIVE NOTE - OPERATION/FINDINGS
R neck incision, dissected down through platysma. Identified and ligated facial vein. Identified and preserved vagus and  hypoglossal nerves. Arteriotomy performed revealing focal severe atherosclerotic plaque. Endarterectomy performed with shunt. Patch angioplasty with bovine pericardium patch. Heparin reversed with protamine. Closed in layers.

## 2022-10-27 ENCOUNTER — TRANSCRIPTION ENCOUNTER (OUTPATIENT)
Age: 77
End: 2022-10-27

## 2022-10-27 VITALS
RESPIRATION RATE: 17 BRPM | DIASTOLIC BLOOD PRESSURE: 68 MMHG | OXYGEN SATURATION: 93 % | HEART RATE: 80 BPM | SYSTOLIC BLOOD PRESSURE: 162 MMHG

## 2022-10-27 PROBLEM — E78.5 HYPERLIPIDEMIA, UNSPECIFIED: Chronic | Status: ACTIVE | Noted: 2022-10-25

## 2022-10-27 PROBLEM — I10 ESSENTIAL (PRIMARY) HYPERTENSION: Chronic | Status: ACTIVE | Noted: 2022-10-25

## 2022-10-27 PROBLEM — E11.9 TYPE 2 DIABETES MELLITUS WITHOUT COMPLICATIONS: Chronic | Status: ACTIVE | Noted: 2022-10-25

## 2022-10-27 LAB
ANION GAP SERPL CALC-SCNC: 12 MMOL/L — SIGNIFICANT CHANGE UP (ref 5–17)
BUN SERPL-MCNC: 19 MG/DL — SIGNIFICANT CHANGE UP (ref 7–23)
CALCIUM SERPL-MCNC: 9.6 MG/DL — SIGNIFICANT CHANGE UP (ref 8.4–10.5)
CHLORIDE SERPL-SCNC: 108 MMOL/L — SIGNIFICANT CHANGE UP (ref 96–108)
CO2 SERPL-SCNC: 20 MMOL/L — LOW (ref 22–31)
CREAT SERPL-MCNC: 1.48 MG/DL — HIGH (ref 0.5–1.3)
EGFR: 36 ML/MIN/1.73M2 — LOW
GLUCOSE BLDC GLUCOMTR-MCNC: 145 MG/DL — HIGH (ref 70–99)
GLUCOSE BLDC GLUCOMTR-MCNC: 258 MG/DL — HIGH (ref 70–99)
GLUCOSE SERPL-MCNC: 138 MG/DL — HIGH (ref 70–99)
HCT VFR BLD CALC: 27 % — LOW (ref 34.5–45)
HGB BLD-MCNC: 9 G/DL — LOW (ref 11.5–15.5)
MAGNESIUM SERPL-MCNC: 2.1 MG/DL — SIGNIFICANT CHANGE UP (ref 1.6–2.6)
MCHC RBC-ENTMCNC: 27 PG — SIGNIFICANT CHANGE UP (ref 27–34)
MCHC RBC-ENTMCNC: 33.3 GM/DL — SIGNIFICANT CHANGE UP (ref 32–36)
MCV RBC AUTO: 81.1 FL — SIGNIFICANT CHANGE UP (ref 80–100)
NRBC # BLD: 0 /100 WBCS — SIGNIFICANT CHANGE UP (ref 0–0)
PHOSPHATE SERPL-MCNC: 3.4 MG/DL — SIGNIFICANT CHANGE UP (ref 2.5–4.5)
PLATELET # BLD AUTO: 220 K/UL — SIGNIFICANT CHANGE UP (ref 150–400)
POTASSIUM SERPL-MCNC: 5 MMOL/L — SIGNIFICANT CHANGE UP (ref 3.5–5.3)
POTASSIUM SERPL-SCNC: 5 MMOL/L — SIGNIFICANT CHANGE UP (ref 3.5–5.3)
RBC # BLD: 3.33 M/UL — LOW (ref 3.8–5.2)
RBC # FLD: 15.7 % — HIGH (ref 10.3–14.5)
SODIUM SERPL-SCNC: 140 MMOL/L — SIGNIFICANT CHANGE UP (ref 135–145)
WBC # BLD: 9.01 K/UL — SIGNIFICANT CHANGE UP (ref 3.8–10.5)
WBC # FLD AUTO: 9.01 K/UL — SIGNIFICANT CHANGE UP (ref 3.8–10.5)

## 2022-10-27 RX ORDER — FUROSEMIDE 40 MG
40 TABLET ORAL DAILY
Refills: 0 | Status: DISCONTINUED | OUTPATIENT
Start: 2022-10-27 | End: 2022-10-27

## 2022-10-27 RX ORDER — HYDRALAZINE HCL 50 MG
10 TABLET ORAL ONCE
Refills: 0 | Status: COMPLETED | OUTPATIENT
Start: 2022-10-27 | End: 2022-10-27

## 2022-10-27 RX ORDER — HYDRALAZINE HCL 50 MG
5 TABLET ORAL ONCE
Refills: 0 | Status: COMPLETED | OUTPATIENT
Start: 2022-10-27 | End: 2022-10-27

## 2022-10-27 RX ORDER — LOSARTAN POTASSIUM 100 MG/1
100 TABLET, FILM COATED ORAL DAILY
Refills: 0 | Status: DISCONTINUED | OUTPATIENT
Start: 2022-10-27 | End: 2022-10-27

## 2022-10-27 RX ORDER — ACETAMINOPHEN 500 MG
650 TABLET ORAL EVERY 6 HOURS
Refills: 0 | Status: DISCONTINUED | OUTPATIENT
Start: 2022-10-27 | End: 2022-10-27

## 2022-10-27 RX ADMIN — Medication 100 MILLIGRAM(S): at 10:31

## 2022-10-27 RX ADMIN — Medication 100 MILLIGRAM(S): at 03:33

## 2022-10-27 RX ADMIN — Medication 81 MILLIGRAM(S): at 10:32

## 2022-10-27 RX ADMIN — Medication 40 MILLIGRAM(S): at 10:32

## 2022-10-27 RX ADMIN — Medication 5 MILLIGRAM(S): at 05:58

## 2022-10-27 RX ADMIN — LOSARTAN POTASSIUM 100 MILLIGRAM(S): 100 TABLET, FILM COATED ORAL at 10:32

## 2022-10-27 RX ADMIN — ATENOLOL 50 MILLIGRAM(S): 25 TABLET ORAL at 05:40

## 2022-10-27 RX ADMIN — Medication 650 MILLIGRAM(S): at 11:31

## 2022-10-27 RX ADMIN — Medication 6: at 11:54

## 2022-10-27 RX ADMIN — Medication 10 MILLIGRAM(S): at 06:52

## 2022-10-27 RX ADMIN — Medication 650 MILLIGRAM(S): at 10:31

## 2022-10-27 RX ADMIN — AMLODIPINE BESYLATE 10 MILLIGRAM(S): 2.5 TABLET ORAL at 05:40

## 2022-10-27 NOTE — DISCHARGE NOTE PROVIDER - NSDCFUSCHEDAPPT_GEN_ALL_CORE_FT
Magnolia Arevalo  [facility]  INTMED [address]  Scheduled Appointment: 12/13/2022     Madhuri Felipe  [facility]  VASCULAR [address]  Scheduled Appointment: 11/08/2022    Magnolia Arevalo  [facility]  INTMED [address]  Scheduled Appointment: 12/13/2022

## 2022-10-27 NOTE — DISCHARGE NOTE NURSING/CASE MANAGEMENT/SOCIAL WORK - NSDCPEFALRISK_GEN_ALL_CORE
For information on Fall & Injury Prevention, visit: [email or web address] OR  [email or web address] OR  https://www.cdc.gov/steadi/patient.html

## 2022-10-27 NOTE — DISCHARGE NOTE PROVIDER - NSDCFUADDINST_GEN_ALL_CORE_FT
FOLLOW UP: Dr. Felipe in 1 week.   Your appointment has been made for 11/8/22 at 1230pm. Call the office at  with any questions  WOUND CARE: You may shower; soap and water over incision sites. Do not scrub. Pat dry when done.   ACTIVITY: Ambulate as tolerated, but no heavy lifting (>10lbs) or strenuous exercise. No sharp neck turns, no driving until cleared by surgeon.   DIET: You may resume regular diet.   Call the office if you experience increasing pain, redness, swelling or drainage from incision sites/wounds, fever/chills, or a headache that does not resolve with Tylenol.   NEW MEDICATIONS: none. Resume all home medications. Use Tylenol as needed for incisional pain.   DISCHARGE DESTINATION: Home    Call 911 if you or someone you know experiences the following symptoms of stroke (can be remembered by BE FAST):  •Balance: Dizziness, loss of balance, or a sense of falling  •Eyes: Sudden double vision or blurred vision  •Face: drooping of one side of the face  •Arm: arm weakness  •Speech:  Sudden trouble talking or slurred speech, trouble understanding others  •Time: Time to call for an ambulance fast!

## 2022-10-27 NOTE — DISCHARGE NOTE PROVIDER - NSDCCPCAREPLAN_GEN_ALL_CORE_FT
PRINCIPAL DISCHARGE DIAGNOSIS  Diagnosis: Carotid artery stenosis  Assessment and Plan of Treatment: s/p R CEA      SECONDARY DISCHARGE DIAGNOSES  Diagnosis: Hypertension  Assessment and Plan of Treatment:     Diagnosis: Hyperlipidemia  Assessment and Plan of Treatment:     Diagnosis: Diabetes  Assessment and Plan of Treatment:      PRINCIPAL DISCHARGE DIAGNOSIS  Diagnosis: Carotid artery stenosis  Assessment and Plan of Treatment: s/p R CEA      SECONDARY DISCHARGE DIAGNOSES  Diagnosis: Hypertension  Assessment and Plan of Treatment:     Diagnosis: Hyperlipidemia  Assessment and Plan of Treatment:     Diagnosis: Diabetes  Assessment and Plan of Treatment:     Diagnosis: Uterine fibroid  Assessment and Plan of Treatment:

## 2022-10-27 NOTE — DISCHARGE NOTE PROVIDER - CARE PROVIDER_API CALL
Madhuri Felipe)  Surgery; Vascular Surgery  [address] [place] [address]  Phone: [phone]  Fax: [phone]  Follow Up Time: 2 weeks   Madhuri Felipe)  Surgery; Vascular Surgery  [address] [place] [address]  Phone: [phone]  Fax: [phone]  Scheduled Appointment: 11/08/2022 12:30 PM

## 2022-10-27 NOTE — DISCHARGE NOTE PROVIDER - PROVIDER TOKENS
PROVIDER:[TOKEN:[[id number]],FOLLOWUP:[2 weeks]] PROVIDER:[TOKEN:[[id number]],SCHEDULEDAPPT:[11/08/2022],SCHEDULEDAPPTTIME:[12:30 PM]]

## 2022-10-27 NOTE — DISCHARGE NOTE PROVIDER - HOSPITAL COURSE
77F with PMH of HTN, HLD, DM, presents for right carotid stenosis. Patient was being followed for known R carotid disease, with recent duplex showing ~70% stenosis (388/45 cm/s). Patient asymptomatic on day of operation. On 10/26 patient underwent right carotid endarterectomy. Tolerated well, no immediate complications. The next morning, patient had hypertension to 200 systolic and complained of headache. Headache and hypertension resolved after hydralazine dose. No focal deficits. At time of discharge, patient was medically stable and no headache.   77F with PMH of HTN, HLD, DM, presents for right carotid stenosis. Patient was being followed for known R carotid disease, with recent duplex showing ~70% stenosis (388/45 cm/s). Patient asymptomatic on day of operation. On 10/26 patient underwent right carotid endarterectomy. Tolerated well, no immediate complications. The next morning, patient had hypertension to 200 systolic and complained of headache. Headache and hypertension resolved after hydralazine dose. Patient resumed on home medications. No focal deficits. At time of discharge, patient was medically stable and no headache.

## 2022-10-27 NOTE — DISCHARGE NOTE NURSING/CASE MANAGEMENT/SOCIAL WORK - PATIENT PORTAL LINK FT
You can access the FollowMyHealth Patient Portal offered by [facility] by registering at the following website: [email or web address]. By joining [employer]’s FollowMyHealth portal, you will also be able to view your health information using other applications (apps) compatible with our system.

## 2022-10-27 NOTE — DISCHARGE NOTE PROVIDER - NSDCMRMEDTOKEN_GEN_ALL_CORE_FT
amLODIPine 10 mg oral tablet: 1 tab(s) orally once a day  aspirin 81 mg oral tablet, chewable: 1 tab(s) orally once a day  atenolol 50 mg oral tablet: 1 tab(s) orally once a day  fenofibrate 145 mg oral tablet: 1 tab(s) orally once a day  furosemide 40 mg oral tablet: 1 tab(s) orally once a day  glipizide-metformin 2.5 mg-500 mg oral tablet: orally 3 times a day  Levemir 100 units/mL subcutaneous solution: 36 units in the morning   losartan 100 mg oral tablet: 1 tab(s) orally once a day  lovastatin 40 mg oral tablet: 1 tab(s) orally once a day  terazosin 10 mg oral capsule: 1 cap(s) orally once a day (at bedtime)

## 2022-10-31 ENCOUNTER — NON-APPOINTMENT (OUTPATIENT)
Age: 77
End: 2022-10-31

## 2022-11-02 LAB — SURGICAL PATHOLOGY STUDY: SIGNIFICANT CHANGE UP

## 2022-11-03 DIAGNOSIS — I10 ESSENTIAL (PRIMARY) HYPERTENSION: ICD-10-CM

## 2022-11-03 DIAGNOSIS — I65.21 OCCLUSION AND STENOSIS OF RIGHT CAROTID ARTERY: ICD-10-CM

## 2022-11-03 DIAGNOSIS — E11.51 TYPE 2 DIABETES MELLITUS WITH DIABETIC PERIPHERAL ANGIOPATHY WITHOUT GANGRENE: ICD-10-CM

## 2022-11-03 DIAGNOSIS — E78.5 HYPERLIPIDEMIA, UNSPECIFIED: ICD-10-CM

## 2022-11-03 DIAGNOSIS — Z90.710 ACQUIRED ABSENCE OF BOTH CERVIX AND UTERUS: ICD-10-CM

## 2022-11-03 DIAGNOSIS — Z79.82 LONG TERM (CURRENT) USE OF ASPIRIN: ICD-10-CM

## 2022-11-08 ENCOUNTER — APPOINTMENT (OUTPATIENT)
Dept: VASCULAR SURGERY | Facility: CLINIC | Age: 77
End: 2022-11-08

## 2022-11-08 VITALS
WEIGHT: 178 LBS | DIASTOLIC BLOOD PRESSURE: 78 MMHG | SYSTOLIC BLOOD PRESSURE: 156 MMHG | BODY MASS INDEX: 29.66 KG/M2 | HEART RATE: 71 BPM | HEIGHT: 65 IN

## 2022-11-08 PROCEDURE — C1889: CPT

## 2022-11-08 PROCEDURE — 93882 EXTRACRANIAL UNI/LTD STUDY: CPT

## 2022-11-08 PROCEDURE — C9399: CPT

## 2022-11-08 PROCEDURE — 80048 BASIC METABOLIC PNL TOTAL CA: CPT

## 2022-11-08 PROCEDURE — 88304 TISSUE EXAM BY PATHOLOGIST: CPT

## 2022-11-08 PROCEDURE — 88311 DECALCIFY TISSUE: CPT

## 2022-11-08 PROCEDURE — C1887: CPT

## 2022-11-08 PROCEDURE — 86901 BLOOD TYPING SEROLOGIC RH(D): CPT

## 2022-11-08 PROCEDURE — C1894: CPT

## 2022-11-08 PROCEDURE — 85610 PROTHROMBIN TIME: CPT

## 2022-11-08 PROCEDURE — 85347 COAGULATION TIME ACTIVATED: CPT

## 2022-11-08 PROCEDURE — 36415 COLL VENOUS BLD VENIPUNCTURE: CPT

## 2022-11-08 PROCEDURE — 84100 ASSAY OF PHOSPHORUS: CPT

## 2022-11-08 PROCEDURE — 85730 THROMBOPLASTIN TIME PARTIAL: CPT

## 2022-11-08 PROCEDURE — 85027 COMPLETE CBC AUTOMATED: CPT

## 2022-11-08 PROCEDURE — C1769: CPT

## 2022-11-08 PROCEDURE — 82962 GLUCOSE BLOOD TEST: CPT

## 2022-11-08 PROCEDURE — 99024 POSTOP FOLLOW-UP VISIT: CPT

## 2022-11-08 PROCEDURE — 86900 BLOOD TYPING SEROLOGIC ABO: CPT

## 2022-11-08 PROCEDURE — 86850 RBC ANTIBODY SCREEN: CPT

## 2022-11-08 PROCEDURE — 83735 ASSAY OF MAGNESIUM: CPT

## 2022-11-10 NOTE — DISCUSSION/SUMMARY
[FreeTextEntry1] : 77yoF w/progression of her R ICA stenosis noted on surveillance duplex, now s/p successful R CEA w/patch angioplasty, utilization of shunt w/o complication.  Pt denies any post-operative neuro symptoms at this time including visual disturbances, headaches, weakness, and is back on all home meds.\par \par Neuro exam WNL today, and post-op carotid duplex performed to evaluate for CEA patency reveals widely patent R CEA and R vertebral artery patent w/antegrade flow, large hematoma noted at the operative site.  Reassured pt that the hematoma will resolve w/time; she was instructed to RTO in 6mos for surveillance.

## 2022-11-10 NOTE — PHYSICAL EXAM
[Normal Thyroid] : the thyroid was normal [Carotid Bruits] : carotid bruit  [Normal Breath Sounds] : Normal breath sounds [Normal Heart Sounds] : normal heart sounds [Normal Rate and Rhythm] : normal rate and rhythm [2+] : left 2+ [No Rash or Lesion] : No rash or lesion [Alert] : alert [Calm] : calm [JVD] : no jugular venous distention  [Right Carotid Bruit] : no bruit heard over the right carotid [Left Carotid Bruit] : no bruit heard over the left carotid [Purpura] : no purpura  [Petechiae] : no petechiae [Skin Ulcer] : no ulcer [Skin Induration] : no induration [de-identified] : Well-nourished, NAD [de-identified] : NC/AT, barbaraictalbert, EOMIx6 [de-identified] : FROM throughout, strength 5/5x4 [de-identified] : CNII-XII grossly intact, CITLALLI grossly intact

## 2022-11-10 NOTE — REASON FOR VISIT
[de-identified] : R CEA w/patch angioplasty, utilization of shunt [de-identified] : 10/26/2022 [de-identified] : 13 [de-identified] : 2 [de-identified] : 77yoF w/progression of her R ICA stenosis noted on surveillance duplex, now s/p successful R CEA w/patch angioplasty, utilization of shunt w/o complication.  Pt denies any post-operative neuro symptoms at this time including visual disturbances, headaches, weakness, and is back on all home meds.

## 2022-11-10 NOTE — ADDENDUM
[FreeTextEntry1] : This note was written by Saúl Venegas, acting as a scribe for Dr. Madhuri Felipe.  I, Dr. Madhuri Felipe, have read and attest that all the information, medical decision-making, and discharge instructions within are true and accurate.\par \par I, Dr. Madhuri Felipe, personally performed the evaluation and management (E/M) services for this established patient who presents today with (an) existing condition(s).  That E/M includes conducting the examination, assessing all conditions, and (re)establishing/reinforcing a plan of care.  Today, my ACP, Saúl Venegas, was here to observe my evaluation and management services for this condition to be followed going forward.

## 2022-12-13 ENCOUNTER — APPOINTMENT (OUTPATIENT)
Dept: INTERNAL MEDICINE | Facility: CLINIC | Age: 77
End: 2022-12-13

## 2022-12-19 LAB — SARS-COV-2 N GENE NPH QL NAA+PROBE: NOT DETECTED

## 2022-12-20 ENCOUNTER — APPOINTMENT (OUTPATIENT)
Dept: INTERNAL MEDICINE | Facility: CLINIC | Age: 77
End: 2022-12-20

## 2023-02-21 ENCOUNTER — APPOINTMENT (OUTPATIENT)
Dept: INTERNAL MEDICINE | Facility: CLINIC | Age: 78
End: 2023-02-21

## 2023-04-05 ENCOUNTER — APPOINTMENT (OUTPATIENT)
Dept: INTERNAL MEDICINE | Facility: CLINIC | Age: 78
End: 2023-04-05
Payer: MEDICARE

## 2023-04-05 VITALS
OXYGEN SATURATION: 99 % | SYSTOLIC BLOOD PRESSURE: 154 MMHG | TEMPERATURE: 97.1 F | WEIGHT: 176 LBS | DIASTOLIC BLOOD PRESSURE: 82 MMHG | BODY MASS INDEX: 30.05 KG/M2 | HEIGHT: 64 IN | HEART RATE: 77 BPM

## 2023-04-05 PROCEDURE — 99214 OFFICE O/P EST MOD 30 MIN: CPT | Mod: GC,25

## 2023-04-05 PROCEDURE — 93000 ELECTROCARDIOGRAM COMPLETE: CPT

## 2023-04-05 PROCEDURE — 36415 COLL VENOUS BLD VENIPUNCTURE: CPT

## 2023-04-05 NOTE — END OF VISIT
[] : Resident [FreeTextEntry3] : \par 76 yo f w/ h/o htn, hld, ckd, carotid stenosis s/p cea 2023 here for preop for eye surgery\par \par #Preop- okay for low risk procedure cataractu surgery .has good mets. resonabel to proceed pending discussing with vascualr if okay given recent carotid surgery. can decrease long acting insulin to 15 units night before surgery (50 %). EKG stable from 2022- rbb, twi.\par \par #CKD- f/u renal for htn. creatinine little higher than before\par \par #HTN- per patient she spoke to her renal who said we can adjust bp med. discused with her home bps 130-160s. will get 24 hr bp monitor and if not well controleld consider glp1/sglt2?\par \par #diabetes- stop metformin. decrease glipizide to 2.5mg once a dayy and recheck sugar in a fe months given gfr 29. recent a1c fine

## 2023-04-05 NOTE — HISTORY OF PRESENT ILLNESS
[Aortic Stenosis] : aortic stenosis [No Pertinent Pulmonary History] : no history of asthma, COPD, sleep apnea, or smoking [No Adverse Anesthesia Reaction] : no adverse anesthesia reaction in self or family member [Chronic Kidney Disease] : chronic kidney disease [Diabetes] : diabetes [(Patient denies any chest pain, claudication, dyspnea on exertion, orthopnea, palpitations or syncope)] : Patient denies any chest pain, claudication, dyspnea on exertion, orthopnea, palpitations or syncope [Anti-Platelet Agents: _____] : Anti-Platelet Agents: [unfilled] [Good (7-10 METs)] : Good (7-10 METs) [Atrial Fibrillation] : no atrial fibrillation [Coronary Artery Disease] : no coronary artery disease [Recent Myocardial Infarction] : no recent myocardial infarction [Implantable Device/Pacemaker] : no implantable device/pacemaker [Asthma] : no asthma [COPD] : no COPD [Sleep Apnea] : no sleep apnea [Smoker] : not a smoker [Chronic Anticoagulation] : no chronic anticoagulation [FreeTextEntry1] : Cataract surgery [FreeTextEntry2] : 4/18/2023 [FreeTextEntry3] : Dr. Jose Jackson [FreeTextEntry4] : 77F w/ PMHx HTN, HLD, DM2, CKD3 (f/w nephrology Dr. Pearson at [facility]), and right ICA stenosis s/p CEA in 11/22 presents for preop clearance for cataract surgery. Patient is otherwise in good health. States she had surgery few months ago for her R ICA stenosis and tolerated well. Patient can tolerate physical activity and does all her chores at home. Lives in [place] with 15 steps and has no problem going up the steps. Tolerated anesthesia well in her CEA surgery. On aspirin, will continue that. \par \par Patient currently on levemir and says her insurance sent her letter asking to switch to lantus.  [FreeTextEntry7] : EKG with existing RBBB and TWI in V1-V3, unchanged from prior

## 2023-04-05 NOTE — ASSESSMENT
[Patient Requires Further Testing] : Patient requires further testing [Continue anti-platelet treatment as is] : Continue current anti-platelet treatment [Modify medications prior to procedure] : Modify medications prior to procedure [FreeTextEntry4] : 77F w/ PMHx HTN, HLD, DM2, CKD3 (f/w nephrology Dr. Pearson at [facility]), and right ICA stenosis s/p CEA in 11/22 presents for preop clearance for cataract surgery. Patient with >4 METS and history of tolerating anesthesia well. EKG at baseline. Pending further labs. [FreeTextEntry7] : Patient on lantus. Will ask to take half dose of 15u at night prior to procedure

## 2023-04-05 NOTE — PLAN
[FreeTextEntry1] : #Pre-op- Patient is pending pre-op for cataract surgery. Patient can perform >METS and does all her ADLs and walks up the stairs to her home every day. Currently walking more. Has history of DM2, well controlled. No history of CAD. Recent CEA done. \par - Will reach out to vascular regarding current procedure after last surgery\par - F/u CBC, BMP\par - EKG with RBBB and TWI in V1-V3 as per baseline\par - Can continue with aspirin\par - Adjust lantus to 15u night before procedure\par - RCRI Class II, 6% 30-day risk of death, MI or cardiac arrest\par - Pending bloodwork and vascular advice given recent carotid surgery, patient will be low-moderate risk for low risk surgery. \par \par #HTN- Patient with hx of HTN on Terazosin, losartan and amlodipine with BP 150s in office. Patient says at home BP fluctuates between 130-160s. \par - Recommend wearing 24h bp monitor to assess pattern of BP, will consider adding another BP agent if persistently high\par - Will touch base with renal about BP\par \par #DM2- Patient with hx of DM on glipizide-metformin combo pill and levemir qhs. \par - Given CKD with GFR 29, recommend d/c metformin, decrease glipizide to 2.5mg qd\par - Switched levemir to lantus 30u qhs due to insurance coverage\par \par #Hypercalcemia- patient found to have Calcium 10.5 and  on outpatient labs. Workup being done with nephrologist.\par - will evaluate on next visit. \par \par RTC in 1 month

## 2023-04-10 LAB
ALBUMIN SERPL ELPH-MCNC: 4.4 G/DL
ALP BLD-CCNC: 43 U/L
ALT SERPL-CCNC: 23 U/L
ANION GAP SERPL CALC-SCNC: 13 MMOL/L
AST SERPL-CCNC: 26 U/L
BASOPHILS # BLD AUTO: 0.07 K/UL
BASOPHILS NFR BLD AUTO: 1 %
BILIRUB SERPL-MCNC: 0.2 MG/DL
BUN SERPL-MCNC: 33 MG/DL
CALCIUM SERPL-MCNC: 10.6 MG/DL
CHLORIDE SERPL-SCNC: 106 MMOL/L
CO2 SERPL-SCNC: 24 MMOL/L
CREAT SERPL-MCNC: 2.09 MG/DL
EGFR: 24 ML/MIN/1.73M2
EOSINOPHIL # BLD AUTO: 0.3 K/UL
EOSINOPHIL NFR BLD AUTO: 4.2 %
GLUCOSE SERPL-MCNC: 188 MG/DL
HCT VFR BLD CALC: 28.1 %
HGB BLD-MCNC: 9.2 G/DL
IMM GRANULOCYTES NFR BLD AUTO: 0.3 %
LYMPHOCYTES # BLD AUTO: 2.27 K/UL
LYMPHOCYTES NFR BLD AUTO: 31.6 %
MAN DIFF?: NORMAL
MCHC RBC-ENTMCNC: 26.4 PG
MCHC RBC-ENTMCNC: 32.7 GM/DL
MCV RBC AUTO: 80.7 FL
MONOCYTES # BLD AUTO: 0.74 K/UL
MONOCYTES NFR BLD AUTO: 10.3 %
NEUTROPHILS # BLD AUTO: 3.79 K/UL
NEUTROPHILS NFR BLD AUTO: 52.6 %
PLATELET # BLD AUTO: 188 K/UL
POTASSIUM SERPL-SCNC: 4.8 MMOL/L
PROT SERPL-MCNC: 7.1 G/DL
RBC # BLD: 3.48 M/UL
RBC # FLD: 17.4 %
SODIUM SERPL-SCNC: 144 MMOL/L
WBC # FLD AUTO: 7.19 K/UL

## 2023-04-13 ENCOUNTER — APPOINTMENT (OUTPATIENT)
Dept: INTERNAL MEDICINE | Facility: CLINIC | Age: 78
End: 2023-04-13
Payer: MEDICARE

## 2023-04-13 VITALS
HEIGHT: 64 IN | SYSTOLIC BLOOD PRESSURE: 147 MMHG | BODY MASS INDEX: 30.05 KG/M2 | HEART RATE: 64 BPM | TEMPERATURE: 97 F | DIASTOLIC BLOOD PRESSURE: 79 MMHG | OXYGEN SATURATION: 98 % | WEIGHT: 176 LBS

## 2023-04-13 DIAGNOSIS — D63.8 ANEMIA IN OTHER CHRONIC DISEASES CLASSIFIED ELSEWHERE: ICD-10-CM

## 2023-04-13 PROCEDURE — 36415 COLL VENOUS BLD VENIPUNCTURE: CPT

## 2023-04-13 PROCEDURE — 99213 OFFICE O/P EST LOW 20 MIN: CPT | Mod: 25,GC

## 2023-04-14 ENCOUNTER — NON-APPOINTMENT (OUTPATIENT)
Age: 78
End: 2023-04-14

## 2023-04-14 LAB
ALBUMIN SERPL ELPH-MCNC: 4.6 G/DL
ALP BLD-CCNC: 43 U/L
ALT SERPL-CCNC: 29 U/L
ANION GAP SERPL CALC-SCNC: 11 MMOL/L
AST SERPL-CCNC: 35 U/L
BASOPHILS # BLD AUTO: 0.07 K/UL
BASOPHILS NFR BLD AUTO: 1 %
BILIRUB SERPL-MCNC: 0.3 MG/DL
BUN SERPL-MCNC: 39 MG/DL
CALCIUM SERPL-MCNC: 10.7 MG/DL
CHLORIDE SERPL-SCNC: 108 MMOL/L
CO2 SERPL-SCNC: 26 MMOL/L
CREAT SERPL-MCNC: 2.34 MG/DL
EGFR: 21 ML/MIN/1.73M2
EOSINOPHIL # BLD AUTO: 0.34 K/UL
EOSINOPHIL NFR BLD AUTO: 4.8 %
GLUCOSE SERPL-MCNC: 108 MG/DL
HCT VFR BLD CALC: 29.1 %
HGB BLD-MCNC: 9.4 G/DL
IMM GRANULOCYTES NFR BLD AUTO: 0.3 %
LYMPHOCYTES # BLD AUTO: 2.76 K/UL
LYMPHOCYTES NFR BLD AUTO: 39 %
MAN DIFF?: NORMAL
MCHC RBC-ENTMCNC: 26.9 PG
MCHC RBC-ENTMCNC: 32.3 GM/DL
MCV RBC AUTO: 83.4 FL
MONOCYTES # BLD AUTO: 0.64 K/UL
MONOCYTES NFR BLD AUTO: 9.1 %
NEUTROPHILS # BLD AUTO: 3.24 K/UL
NEUTROPHILS NFR BLD AUTO: 45.8 %
PLATELET # BLD AUTO: 180 K/UL
POTASSIUM SERPL-SCNC: 5.1 MMOL/L
PROT SERPL-MCNC: 7.3 G/DL
RBC # BLD: 3.49 M/UL
RBC # FLD: 18.1 %
SODIUM SERPL-SCNC: 146 MMOL/L
WBC # FLD AUTO: 7.07 K/UL

## 2023-04-18 ENCOUNTER — APPOINTMENT (OUTPATIENT)
Dept: INTERNAL MEDICINE | Facility: CLINIC | Age: 78
End: 2023-04-18

## 2023-04-18 VITALS — HEIGHT: 64 IN | BODY MASS INDEX: 30.05 KG/M2 | WEIGHT: 176 LBS

## 2023-04-25 ENCOUNTER — APPOINTMENT (OUTPATIENT)
Dept: INTERNAL MEDICINE | Facility: CLINIC | Age: 78
End: 2023-04-25

## 2023-05-05 ENCOUNTER — APPOINTMENT (OUTPATIENT)
Dept: INTERNAL MEDICINE | Facility: CLINIC | Age: 78
End: 2023-05-05
Payer: MEDICARE

## 2023-05-05 VITALS
TEMPERATURE: 97.1 F | HEART RATE: 63 BPM | WEIGHT: 178 LBS | BODY MASS INDEX: 30.39 KG/M2 | OXYGEN SATURATION: 98 % | SYSTOLIC BLOOD PRESSURE: 138 MMHG | HEIGHT: 64 IN | DIASTOLIC BLOOD PRESSURE: 61 MMHG

## 2023-05-05 PROCEDURE — 36415 COLL VENOUS BLD VENIPUNCTURE: CPT

## 2023-05-05 PROCEDURE — 99214 OFFICE O/P EST MOD 30 MIN: CPT | Mod: 25

## 2023-05-05 RX ORDER — FENOFIBRATE 145 MG/1
145 TABLET, COATED ORAL DAILY
Refills: 0 | Status: DISCONTINUED | COMMUNITY
Start: 2020-11-02 | End: 2023-05-05

## 2023-05-05 RX ORDER — GLIPIZIDE 2.5 MG/1
2.5 TABLET, FILM COATED, EXTENDED RELEASE ORAL DAILY
Qty: 30 | Refills: 2 | Status: DISCONTINUED | COMMUNITY
Start: 2023-04-05 | End: 2023-05-05

## 2023-05-06 ENCOUNTER — NON-APPOINTMENT (OUTPATIENT)
Age: 78
End: 2023-05-06

## 2023-05-07 ENCOUNTER — RESULT CHARGE (OUTPATIENT)
Age: 78
End: 2023-05-07

## 2023-05-08 ENCOUNTER — APPOINTMENT (OUTPATIENT)
Dept: INTERNAL MEDICINE | Facility: CLINIC | Age: 78
End: 2023-05-08
Payer: MEDICARE

## 2023-05-08 VITALS
HEART RATE: 70 BPM | DIASTOLIC BLOOD PRESSURE: 78 MMHG | SYSTOLIC BLOOD PRESSURE: 158 MMHG | RESPIRATION RATE: 16 BRPM | OXYGEN SATURATION: 100 %

## 2023-05-08 LAB
CHOLEST SERPL-MCNC: 163 MG/DL
HCT VFR BLD CALC: 28.8 %
HDLC SERPL-MCNC: 56 MG/DL
HGB BLD-MCNC: 9.5 G/DL
LDLC SERPL CALC-MCNC: 87 MG/DL
MCHC RBC-ENTMCNC: 27.1 PG
MCHC RBC-ENTMCNC: 33 GM/DL
MCV RBC AUTO: 82.3 FL
NONHDLC SERPL-MCNC: 108 MG/DL
PLATELET # BLD AUTO: 176 K/UL
RBC # BLD: 3.5 M/UL
RBC # FLD: 16.9 %
TRIGL SERPL-MCNC: 103 MG/DL
WBC # FLD AUTO: 6.55 K/UL

## 2023-05-08 PROCEDURE — 99214 OFFICE O/P EST MOD 30 MIN: CPT | Mod: 25,GC

## 2023-05-08 PROCEDURE — 36415 COLL VENOUS BLD VENIPUNCTURE: CPT

## 2023-05-08 PROCEDURE — 93000 ELECTROCARDIOGRAM COMPLETE: CPT

## 2023-05-09 ENCOUNTER — APPOINTMENT (OUTPATIENT)
Dept: VASCULAR SURGERY | Facility: CLINIC | Age: 78
End: 2023-05-09
Payer: MEDICARE

## 2023-05-09 VITALS
HEART RATE: 83 BPM | WEIGHT: 178 LBS | BODY MASS INDEX: 30.39 KG/M2 | SYSTOLIC BLOOD PRESSURE: 207 MMHG | HEIGHT: 64 IN | DIASTOLIC BLOOD PRESSURE: 65 MMHG

## 2023-05-09 LAB
ANION GAP SERPL CALC-SCNC: 12 MMOL/L
BUN SERPL-MCNC: 55 MG/DL
CALCIUM SERPL-MCNC: 10 MG/DL
CHLORIDE SERPL-SCNC: 105 MMOL/L
CO2 SERPL-SCNC: 24 MMOL/L
CREAT SERPL-MCNC: 3.16 MG/DL
EGFR: 14 ML/MIN/1.73M2
GLUCOSE SERPL-MCNC: 293 MG/DL
POTASSIUM SERPL-SCNC: 5.8 MMOL/L
SODIUM SERPL-SCNC: 140 MMOL/L

## 2023-05-09 PROCEDURE — 93880 EXTRACRANIAL BILAT STUDY: CPT

## 2023-05-09 PROCEDURE — 99213 OFFICE O/P EST LOW 20 MIN: CPT

## 2023-05-11 LAB — SODIUM ?TM SUB UR QN: 97 MMOL/L

## 2023-05-15 LAB
CREAT SPEC-SCNC: 129 MG/DL
UUN UR-MCNC: 769 MG/DL

## 2023-05-19 ENCOUNTER — APPOINTMENT (OUTPATIENT)
Dept: INTERNAL MEDICINE | Facility: CLINIC | Age: 78
End: 2023-05-19
Payer: MEDICARE

## 2023-05-19 VITALS
HEIGHT: 64 IN | DIASTOLIC BLOOD PRESSURE: 81 MMHG | SYSTOLIC BLOOD PRESSURE: 187 MMHG | HEART RATE: 69 BPM | TEMPERATURE: 97.1 F | WEIGHT: 185 LBS | BODY MASS INDEX: 31.58 KG/M2 | OXYGEN SATURATION: 98 %

## 2023-05-19 DIAGNOSIS — H26.9 UNSPECIFIED CATARACT: ICD-10-CM

## 2023-05-19 PROCEDURE — 36415 COLL VENOUS BLD VENIPUNCTURE: CPT

## 2023-05-19 PROCEDURE — 99214 OFFICE O/P EST MOD 30 MIN: CPT | Mod: 25,GC

## 2023-05-19 RX ORDER — SODIUM ZIRCONIUM CYCLOSILICATE 10 G/10G
10 POWDER, FOR SUSPENSION ORAL
Qty: 1 | Refills: 0 | Status: DISCONTINUED | COMMUNITY
Start: 2023-05-06 | End: 2023-05-19

## 2023-05-24 NOTE — PROCEDURE
[FreeTextEntry1] : Carotid duplex performed to evaluate for CEA patency reveals widely patent R CEA and R vertebral artery patent w/antegrade flow

## 2023-05-24 NOTE — HISTORY OF PRESENT ILLNESS
[FreeTextEntry1] : 77yoF w/progression of her R ICA stenosis noted on surveillance duplex, now s/p successful R CEA w/patch angioplasty, utilization of shunt w/o complication, f/u for her 6mo surveillance duplex.  Pt denies any post-operative neuro symptoms at this time including visual disturbances, headaches, weakness, and is back on all home meds.

## 2023-05-24 NOTE — PHYSICAL EXAM
[Normal Thyroid] : the thyroid was normal [Carotid Bruits] : carotid bruit  [Normal Breath Sounds] : Normal breath sounds [Normal Heart Sounds] : normal heart sounds [Normal Rate and Rhythm] : normal rate and rhythm [2+] : left 2+ [No Rash or Lesion] : No rash or lesion [Alert] : alert [Calm] : calm [JVD] : no jugular venous distention  [Right Carotid Bruit] : no bruit heard over the right carotid [Left Carotid Bruit] : no bruit heard over the left carotid [Purpura] : no purpura  [Petechiae] : no petechiae [Skin Ulcer] : no ulcer [Skin Induration] : no induration [de-identified] : Well-nourished, NAD [de-identified] : NC/AT, barbaraictalbert, EOMIx6 [de-identified] : FROM throughout, strength 5/5x4 [de-identified] : CNII-XII grossly intact, CITLALLI grossly intact

## 2023-05-24 NOTE — ASSESSMENT
[FreeTextEntry1] : 77yoF w/progression of her R ICA stenosis noted on surveillance duplex, now s/p successful R CEA w/patch angioplasty, utilization of shunt w/o complication, f/u for her 6mo surveillance duplex.  Pt denies any post-operative neuro symptoms at this time including visual disturbances, headaches, weakness, and is back on all home meds.\par \par Neuro exam WNL today, and post-op carotid duplex performed to evaluate for CEA patency reveals widely patent R CEA and R vertebral artery patent w/antegrade flow, large hematoma noted at the operative site.  Reassured pt that the hematoma will resolve w/time; she was instructed to RTO in 1y for surveillance.

## 2023-05-30 LAB — POTASSIUM SERPL-SCNC: 4.8 MMOL/L

## 2023-06-16 ENCOUNTER — APPOINTMENT (OUTPATIENT)
Dept: INTERNAL MEDICINE | Facility: CLINIC | Age: 78
End: 2023-06-16
Payer: MEDICARE

## 2023-06-16 VITALS
TEMPERATURE: 97.1 F | HEART RATE: 72 BPM | WEIGHT: 177 LBS | SYSTOLIC BLOOD PRESSURE: 185 MMHG | BODY MASS INDEX: 30.22 KG/M2 | OXYGEN SATURATION: 99 % | HEIGHT: 64 IN | DIASTOLIC BLOOD PRESSURE: 70 MMHG

## 2023-06-16 PROCEDURE — 36415 COLL VENOUS BLD VENIPUNCTURE: CPT

## 2023-06-16 PROCEDURE — 99214 OFFICE O/P EST MOD 30 MIN: CPT | Mod: 25,GC

## 2023-06-16 RX ORDER — FUROSEMIDE 20 MG/1
20 TABLET ORAL DAILY
Qty: 15 | Refills: 3 | Status: DISCONTINUED | COMMUNITY
Start: 2023-05-19 | End: 2023-06-16

## 2023-06-16 RX ORDER — BLOOD-GLUCOSE METER
W/DEVICE KIT MISCELLANEOUS
Qty: 1 | Refills: 0 | Status: ACTIVE | COMMUNITY
Start: 2021-07-19 | End: 1900-01-01

## 2023-06-19 ENCOUNTER — NON-APPOINTMENT (OUTPATIENT)
Age: 78
End: 2023-06-19

## 2023-06-20 ENCOUNTER — NON-APPOINTMENT (OUTPATIENT)
Age: 78
End: 2023-06-20

## 2023-06-27 ENCOUNTER — APPOINTMENT (OUTPATIENT)
Dept: INTERNAL MEDICINE | Facility: CLINIC | Age: 78
End: 2023-06-27

## 2023-07-21 ENCOUNTER — APPOINTMENT (OUTPATIENT)
Dept: INTERNAL MEDICINE | Facility: CLINIC | Age: 78
End: 2023-07-21
Payer: MEDICARE

## 2023-07-21 VITALS
TEMPERATURE: 97.1 F | BODY MASS INDEX: 30.22 KG/M2 | DIASTOLIC BLOOD PRESSURE: 77 MMHG | OXYGEN SATURATION: 98 % | HEART RATE: 55 BPM | WEIGHT: 177 LBS | HEIGHT: 64 IN | SYSTOLIC BLOOD PRESSURE: 178 MMHG

## 2023-07-21 PROCEDURE — 99214 OFFICE O/P EST MOD 30 MIN: CPT | Mod: GC

## 2023-07-21 RX ORDER — LOSARTAN POTASSIUM 50 MG/1
50 TABLET, FILM COATED ORAL DAILY
Qty: 14 | Refills: 0 | Status: DISCONTINUED | COMMUNITY
Start: 2023-05-19 | End: 2023-07-21

## 2023-07-21 RX ORDER — PREDNISONE 20 MG/1
20 TABLET ORAL TWICE DAILY
Qty: 14 | Refills: 0 | Status: DISCONTINUED | COMMUNITY
Start: 2023-06-16 | End: 2023-07-21

## 2023-07-21 RX ORDER — ALLOPURINOL 100 MG/1
100 TABLET ORAL EVERY OTHER DAY
Qty: 8 | Refills: 2 | Status: DISCONTINUED | COMMUNITY
Start: 2023-06-20 | End: 2023-07-21

## 2023-07-21 RX ORDER — COLCHICINE 0.6 MG/1
0.6 CAPSULE ORAL EVERY OTHER DAY
Qty: 15 | Refills: 0 | Status: DISCONTINUED | COMMUNITY
Start: 2023-06-16 | End: 2023-07-21

## 2023-07-21 NOTE — PHYSICAL EXAM
[No Acute Distress] : no acute distress [Well-Appearing] : well-appearing [Normal Sclera/Conjunctiva] : normal sclera/conjunctiva [EOMI] : extraocular movements intact [Normal Outer Ear/Nose] : the outer ears and nose were normal in appearance [Normal Oropharynx] : the oropharynx was normal [No JVD] : no jugular venous distention [Supple] : supple [No Respiratory Distress] : no respiratory distress  [Clear to Auscultation] : lungs were clear to auscultation bilaterally [Regular Rhythm] : with a regular rhythm [No Murmur] : no murmur heard [No Edema] : there was no peripheral edema [Soft] : abdomen soft [Non Tender] : non-tender [Normal Posterior Cervical Nodes] : no posterior cervical lymphadenopathy [Normal Anterior Cervical Nodes] : no anterior cervical lymphadenopathy [No Focal Deficits] : no focal deficits [Normal Gait] : normal gait [Normal Affect] : the affect was normal [Normal Mood] : the mood was normal [de-identified] : dentures not in [de-identified] : bradycardic [de-identified] : skin of feet appear darker than shins, mild peeling of skin on feet

## 2023-07-21 NOTE — HEALTH RISK ASSESSMENT
[0] : 2) Feeling down, depressed, or hopeless: Not at all (0) [PHQ-2 Negative - No further assessment needed] : PHQ-2 Negative - No further assessment needed [[id number]] : 0

## 2023-07-21 NOTE — HISTORY OF PRESENT ILLNESS
[FreeTextEntry1] : blood pressure [de-identified] : 77yoF PMH HFpEF, CKD, uncontrolled HTN, IDDM, carotid stenosis (s/p endarterectomy), gout, anemia who presents to monitor BP. Reports checking BP on lower left arm, usually 130-150s but can go as high as 200. Right arm usually a little lower. Takes meds in morning and checks BP each afternoon around lunchtime when she is sitting down. \par Gout symptoms have resolved. Has not been taking allopurinol, she said she didn't get a message about that.

## 2023-07-21 NOTE — END OF VISIT
[] : Resident [FreeTextEntry3] :  77 year old F presenting for follow up. BP at home SBP 140s-150s on L arm, lower on R arm 130s. SBP sometimes 200s on home monitoring. Takes all meds consistently. Has outside nephrologist Dr. Pearson whom she has worked with for many years, due for f/u with him in Sept, gives verbal consent for us to communicate with him. Pain from recent gout flare now resolved, reports some skin peeling on her feet, will see her podiatrist soon. Has not started allopurinol. Well appearing on exam. BP elevated. \par -Resistant HTN. Will obtain renal dopplers and renin/chanell (at hospital lab tomorrow morning). Will discuss with outside nephrologist as well, consider addition of diuretic, will avoid spironolactone for now given prior hyperkalemia. \par -Gout. Single flare now resolved. Will check uric acid level with labs tomorrow since flare has resolved. Defer starting allopurinol unless flares recur.

## 2023-07-21 NOTE — REVIEW OF SYSTEMS
[Fever] : no fever [Night Sweats] : no night sweats [Vision Problems] : no vision problems [Chest Pain] : no chest pain [Palpitations] : no palpitations [Lower Ext Edema] : no lower extremity edema [Orthopnea] : no orthopnea [Shortness Of Breath] : no shortness of breath [Cough] : no cough [Dyspnea on Exertion] : no dyspnea on exertion [Abdominal Pain] : no abdominal pain [Dysuria] : no dysuria [Hematuria] : no hematuria [Frequency] : no frequency [Skin Rash] : no skin rash [Headache] : no headache [Dizziness] : no dizziness [Fainting] : no fainting

## 2023-07-21 NOTE — PLAN
[FreeTextEntry1] : Keep BP meds the same.\par Secondary HTN workup.\par Collateral from her Nephrologist.\par Folllow up in 6wks for BP check (should bring her own machine too).

## 2023-07-25 ENCOUNTER — NON-APPOINTMENT (OUTPATIENT)
Age: 78
End: 2023-07-25

## 2023-07-26 ENCOUNTER — APPOINTMENT (OUTPATIENT)
Dept: ULTRASOUND IMAGING | Facility: HOSPITAL | Age: 78
End: 2023-07-26

## 2023-07-26 ENCOUNTER — OUTPATIENT (OUTPATIENT)
Dept: OUTPATIENT SERVICES | Facility: HOSPITAL | Age: 78
LOS: 1 days | End: 2023-07-26
Payer: COMMERCIAL

## 2023-07-26 DIAGNOSIS — Z90.710 ACQUIRED ABSENCE OF BOTH CERVIX AND UTERUS: Chronic | ICD-10-CM

## 2023-07-26 DIAGNOSIS — D25.9 LEIOMYOMA OF UTERUS, UNSPECIFIED: Chronic | ICD-10-CM

## 2023-07-26 DIAGNOSIS — I65.29 OCCLUSION AND STENOSIS OF UNSPECIFIED CAROTID ARTERY: Chronic | ICD-10-CM

## 2023-07-26 PROCEDURE — 93880 EXTRACRANIAL BILAT STUDY: CPT | Mod: 26

## 2023-07-26 PROCEDURE — 93880 EXTRACRANIAL BILAT STUDY: CPT

## 2023-07-28 LAB
ANION GAP SERPL CALC-SCNC: 11 MMOL/L
BUN SERPL-MCNC: 30 MG/DL
CALCIUM SERPL-MCNC: 10.4 MG/DL
CHLORIDE SERPL-SCNC: 105 MMOL/L
CO2 SERPL-SCNC: 27 MMOL/L
CREAT SERPL-MCNC: 1.97 MG/DL
EGFR: 25 ML/MIN/1.73M2
GLUCOSE SERPL-MCNC: 203 MG/DL
POTASSIUM SERPL-SCNC: 5.3 MMOL/L
SODIUM SERPL-SCNC: 142 MMOL/L
URATE SERPL-MCNC: 8.3 MG/DL

## 2023-08-02 LAB
RENIN ACTIVITY, PLASMA: 8.6 NG/ML/HR
RENIN PLASMA: 58.4 PG/ML

## 2023-08-08 LAB
ANION GAP SERPL CALC-SCNC: 12 MMOL/L
BUN SERPL-MCNC: 32 MG/DL
CALCIUM SERPL-MCNC: 10.3 MG/DL
CHLORIDE SERPL-SCNC: 109 MMOL/L
CO2 SERPL-SCNC: 25 MMOL/L
CREAT SERPL-MCNC: 1.69 MG/DL
CREAT SERPL-MCNC: 1.94 MG/DL
CREAT SERPL-MCNC: 2.14 MG/DL
EGFR: 23 ML/MIN/1.73M2
EGFR: 26 ML/MIN/1.73M2
EGFR: 31 ML/MIN/1.73M2
GLUCOSE SERPL-MCNC: 56 MG/DL
POTASSIUM SERPL-SCNC: 3.9 MMOL/L
SODIUM SERPL-SCNC: 146 MMOL/L
URATE SERPL-MCNC: 6.6 MG/DL

## 2023-08-29 ENCOUNTER — APPOINTMENT (OUTPATIENT)
Dept: INTERNAL MEDICINE | Facility: CLINIC | Age: 78
End: 2023-08-29

## 2023-09-01 ENCOUNTER — RESULT REVIEW (OUTPATIENT)
Age: 78
End: 2023-09-01

## 2023-09-01 ENCOUNTER — APPOINTMENT (OUTPATIENT)
Dept: INTERNAL MEDICINE | Facility: CLINIC | Age: 78
End: 2023-09-01
Payer: MEDICARE

## 2023-09-01 VITALS
WEIGHT: 169 LBS | HEART RATE: 78 BPM | TEMPERATURE: 97 F | SYSTOLIC BLOOD PRESSURE: 181 MMHG | BODY MASS INDEX: 28.85 KG/M2 | DIASTOLIC BLOOD PRESSURE: 69 MMHG | OXYGEN SATURATION: 97 % | HEIGHT: 64 IN

## 2023-09-01 DIAGNOSIS — M54.9 DORSALGIA, UNSPECIFIED: ICD-10-CM

## 2023-09-01 DIAGNOSIS — M25.539 PAIN IN UNSPECIFIED WRIST: ICD-10-CM

## 2023-09-01 PROCEDURE — 99214 OFFICE O/P EST MOD 30 MIN: CPT

## 2023-09-01 NOTE — PHYSICAL EXAM
[No Acute Distress] : no acute distress [Well Nourished] : well nourished [Well Developed] : well developed [Well-Appearing] : well-appearing [Normal Sclera/Conjunctiva] : normal sclera/conjunctiva [PERRL] : pupils equal round and reactive to light [EOMI] : extraocular movements intact [Normal Outer Ear/Nose] : the outer ears and nose were normal in appearance [Normal Oropharynx] : the oropharynx was normal [No JVD] : no jugular venous distention [No Lymphadenopathy] : no lymphadenopathy [Supple] : supple [Thyroid Normal, No Nodules] : the thyroid was normal and there were no nodules present [No Respiratory Distress] : no respiratory distress  [No Accessory Muscle Use] : no accessory muscle use [Clear to Auscultation] : lungs were clear to auscultation bilaterally [Normal Rate] : normal rate  [Regular Rhythm] : with a regular rhythm [Normal S1, S2] : normal S1 and S2 [No Murmur] : no murmur heard [No Carotid Bruits] : no carotid bruits [No Abdominal Bruit] : a ~M bruit was not heard ~T in the abdomen [No Varicosities] : no varicosities [Pedal Pulses Present] : the pedal pulses are present [No Edema] : there was no peripheral edema [No Palpable Aorta] : no palpable aorta [No Extremity Clubbing/Cyanosis] : no extremity clubbing/cyanosis [Soft] : abdomen soft [Non Tender] : non-tender [Non-distended] : non-distended [No Masses] : no abdominal mass palpated [No HSM] : no HSM [Normal Bowel Sounds] : normal bowel sounds [Normal Posterior Cervical Nodes] : no posterior cervical lymphadenopathy [Normal Anterior Cervical Nodes] : no anterior cervical lymphadenopathy [No CVA Tenderness] : no CVA  tenderness [No Spinal Tenderness] : no spinal tenderness [Grossly Normal Strength/Tone] : grossly normal strength/tone [No Rash] : no rash [Coordination Grossly Intact] : coordination grossly intact [No Focal Deficits] : no focal deficits [Normal Gait] : normal gait [Deep Tendon Reflexes (DTR)] : deep tendon reflexes were 2+ and symmetric [Normal Affect] : the affect was normal [Normal Insight/Judgement] : insight and judgment were intact [de-identified] : +bilateral 2cm distal forearm, non-mobile, soft swelling

## 2023-09-01 NOTE — HEALTH RISK ASSESSMENT
[0] : 1) Little interest or pleasure doing things: Not at all (0) [PHQ-2 Negative - No further assessment needed] : PHQ-2 Negative - No further assessment needed [[id number]] : 0

## 2023-09-01 NOTE — HISTORY OF PRESENT ILLNESS
[FreeTextEntry1] : f/u BP check [de-identified] : 78F PMHx resistant HTN, HLD, DM2, HFpEF, CKD, carotid stenosis (s/p endarterectomy), gout, anemia who presents to [facility] for follow-up. Patient states acute on chronic bilateral wrist pain and back pain for 2 weeks. Worse at the end of the day without morning stiffness, tenderness, fevers, muscle cramps/pain or erythema. BP in clinic today 187/74, repeat 200s/80s on both upper extremities. Patient states home BP readings have been similar. She has remained asymptomatic. Patient is compliant with her medications, including BP medications without side effects.

## 2023-09-01 NOTE — ASSESSMENT
[FreeTextEntry1] : 78F PMHx HFpEF, CKD, uncontrolled HTN, IDDM, carotid stenosis (s/p endarterectomy), gout, anemia who presents to monitor BP.   #Bilateral wrist pain Presenting with acute on chronic bilateral wrist pain. Pain with flexion 2/2 bilateral soft distal forearm swelling. No fevers, tenderness, or erythema. Per patient, does not feel like previous gout flares. Likely 2/2 OA.  - Obtain bilateral wrist Xrays - PT/OT referrals - Supportive therapy with OTC pain meds, rest, warm compress  #Resistant HTN In office /74. On home Coreg 12.5mg BID, Amlodipine 10mg, Losartan 100mg, Terazosin 10mg. - Increase Coreg to 25mg BID - Patient would benefit from diuretic use, however would need further discussion with outpatient Nephrologist (Dr. Pearson) - Patient has an appointment with Dr. Pearson on 09/28/23.  #CKD Cr 1.9, at baseline. - f/u outpatient Nephrologist Dr. Pearson on 09/28/23.  #DM Last A1c 7.3 (06/23). On Lantus 30U - Refilled Lantus 30U  RTC in 1 month after seeing Dr. Pearson

## 2023-09-01 NOTE — END OF VISIT
[FreeTextEntry3] : 79 yo female with hx HTN, DM2, Hfpef, gout here for back pain and wrist pain  1) HTN - chronic, above goal, will increase coreg to 25mg and counseled to f/u with renal, consider adding hctz. recommended renal US  2) DM2 - controlled, refill lantus  3) back pain, wrist pain - will get xrays.  recommend PT.

## 2023-09-05 ENCOUNTER — OUTPATIENT (OUTPATIENT)
Dept: OUTPATIENT SERVICES | Facility: HOSPITAL | Age: 78
LOS: 1 days | End: 2023-09-05
Payer: COMMERCIAL

## 2023-09-05 DIAGNOSIS — Z90.710 ACQUIRED ABSENCE OF BOTH CERVIX AND UTERUS: Chronic | ICD-10-CM

## 2023-09-05 DIAGNOSIS — D25.9 LEIOMYOMA OF UTERUS, UNSPECIFIED: Chronic | ICD-10-CM

## 2023-09-05 DIAGNOSIS — I65.29 OCCLUSION AND STENOSIS OF UNSPECIFIED CAROTID ARTERY: Chronic | ICD-10-CM

## 2023-09-05 PROCEDURE — 73110 X-RAY EXAM OF WRIST: CPT | Mod: 26,50

## 2023-09-05 PROCEDURE — 73110 X-RAY EXAM OF WRIST: CPT

## 2023-09-29 ENCOUNTER — NON-APPOINTMENT (OUTPATIENT)
Age: 78
End: 2023-09-29

## 2023-09-29 ENCOUNTER — APPOINTMENT (OUTPATIENT)
Dept: INTERNAL MEDICINE | Facility: CLINIC | Age: 78
End: 2023-09-29
Payer: MEDICARE

## 2023-09-29 VITALS
SYSTOLIC BLOOD PRESSURE: 151 MMHG | WEIGHT: 170 LBS | OXYGEN SATURATION: 99 % | HEART RATE: 75 BPM | BODY MASS INDEX: 29.02 KG/M2 | TEMPERATURE: 97.1 F | DIASTOLIC BLOOD PRESSURE: 73 MMHG | HEIGHT: 64 IN

## 2023-09-29 DIAGNOSIS — M79.606 PAIN IN LEG, UNSPECIFIED: ICD-10-CM

## 2023-09-29 PROCEDURE — 99213 OFFICE O/P EST LOW 20 MIN: CPT | Mod: GC

## 2023-10-24 ENCOUNTER — APPOINTMENT (OUTPATIENT)
Dept: INTERNAL MEDICINE | Facility: CLINIC | Age: 78
End: 2023-10-24

## 2023-10-25 ENCOUNTER — APPOINTMENT (OUTPATIENT)
Dept: INTERNAL MEDICINE | Facility: CLINIC | Age: 78
End: 2023-10-25
Payer: MEDICARE

## 2023-10-25 VITALS
DIASTOLIC BLOOD PRESSURE: 76 MMHG | SYSTOLIC BLOOD PRESSURE: 194 MMHG | BODY MASS INDEX: 29.37 KG/M2 | WEIGHT: 172 LBS | HEIGHT: 64 IN | HEART RATE: 64 BPM | OXYGEN SATURATION: 96 % | TEMPERATURE: 98 F

## 2023-10-25 PROCEDURE — G0009: CPT

## 2023-10-25 PROCEDURE — 99214 OFFICE O/P EST MOD 30 MIN: CPT | Mod: 25

## 2023-10-25 PROCEDURE — 90677 PCV20 VACCINE IM: CPT

## 2023-11-20 ENCOUNTER — APPOINTMENT (OUTPATIENT)
Dept: INTERNAL MEDICINE | Facility: CLINIC | Age: 78
End: 2023-11-20
Payer: MEDICARE

## 2023-11-20 VITALS
SYSTOLIC BLOOD PRESSURE: 196 MMHG | DIASTOLIC BLOOD PRESSURE: 82 MMHG | HEART RATE: 70 BPM | OXYGEN SATURATION: 97 % | TEMPERATURE: 99.3 F | WEIGHT: 172 LBS | BODY MASS INDEX: 29.37 KG/M2 | HEIGHT: 64 IN | RESPIRATION RATE: 16 BRPM

## 2023-11-20 VITALS — SYSTOLIC BLOOD PRESSURE: 216 MMHG | DIASTOLIC BLOOD PRESSURE: 83 MMHG

## 2023-11-20 DIAGNOSIS — M10.9 GOUT, UNSPECIFIED: ICD-10-CM

## 2023-11-20 DIAGNOSIS — R06.09 OTHER FORMS OF DYSPNEA: ICD-10-CM

## 2023-11-20 PROCEDURE — 99214 OFFICE O/P EST MOD 30 MIN: CPT | Mod: GC

## 2023-11-20 RX ORDER — HYDROCHLOROTHIAZIDE 50 MG/1
50 TABLET ORAL DAILY
Qty: 30 | Refills: 3 | Status: DISCONTINUED | COMMUNITY
Start: 2023-09-29 | End: 2023-11-20

## 2023-11-20 RX ORDER — HYDROCHLOROTHIAZIDE 50 MG/1
50 TABLET ORAL
Refills: 0 | Status: DISCONTINUED | COMMUNITY
End: 2023-11-20

## 2023-11-20 RX ORDER — CARVEDILOL 25 MG/1
25 TABLET, FILM COATED ORAL TWICE DAILY
Qty: 60 | Refills: 2 | Status: DISCONTINUED | COMMUNITY
Start: 2023-06-16 | End: 2023-11-20

## 2023-12-01 ENCOUNTER — APPOINTMENT (OUTPATIENT)
Dept: NEPHROLOGY | Facility: CLINIC | Age: 78
End: 2023-12-01
Payer: MEDICARE

## 2023-12-01 VITALS — DIASTOLIC BLOOD PRESSURE: 80 MMHG | SYSTOLIC BLOOD PRESSURE: 206 MMHG | HEART RATE: 58 BPM

## 2023-12-01 PROCEDURE — 93784 AMBL BP MNTR W/SOFTWARE: CPT

## 2023-12-07 ENCOUNTER — NON-APPOINTMENT (OUTPATIENT)
Age: 78
End: 2023-12-07

## 2023-12-11 LAB
METANEPH 24H UR-MRATE: 66 UG/24 HR
METANEPHS 24H UR-MCNC: 33 UG/L
NORMETANEPHRINE 24 HR URINE: 182 UG/24 HR
NORMETANEPHRINE 24H UR-MCNC: 91 UG/L

## 2023-12-20 ENCOUNTER — RX RENEWAL (OUTPATIENT)
Age: 78
End: 2023-12-20

## 2023-12-26 ENCOUNTER — APPOINTMENT (OUTPATIENT)
Dept: INTERNAL MEDICINE | Facility: CLINIC | Age: 78
End: 2023-12-26

## 2024-01-18 ENCOUNTER — INPATIENT (INPATIENT)
Facility: HOSPITAL | Age: 79
LOS: 1 days | Discharge: HOME | DRG: 305 | End: 2024-01-20
Attending: STUDENT IN AN ORGANIZED HEALTH CARE EDUCATION/TRAINING PROGRAM | Admitting: STUDENT IN AN ORGANIZED HEALTH CARE EDUCATION/TRAINING PROGRAM
Payer: COMMERCIAL

## 2024-01-18 ENCOUNTER — APPOINTMENT (OUTPATIENT)
Dept: INTERNAL MEDICINE | Facility: CLINIC | Age: 79
End: 2024-01-18
Payer: MEDICARE

## 2024-01-18 VITALS
HEART RATE: 111 BPM | WEIGHT: 171.96 LBS | OXYGEN SATURATION: 95 % | TEMPERATURE: 99 F | DIASTOLIC BLOOD PRESSURE: 112 MMHG | SYSTOLIC BLOOD PRESSURE: 248 MMHG | HEIGHT: 63 IN | RESPIRATION RATE: 18 BRPM

## 2024-01-18 VITALS — SYSTOLIC BLOOD PRESSURE: 240 MMHG | DIASTOLIC BLOOD PRESSURE: 100 MMHG

## 2024-01-18 VITALS
OXYGEN SATURATION: 98 % | HEART RATE: 122 BPM | RESPIRATION RATE: 16 BRPM | SYSTOLIC BLOOD PRESSURE: 246 MMHG | DIASTOLIC BLOOD PRESSURE: 97 MMHG | HEIGHT: 64 IN | TEMPERATURE: 98.7 F

## 2024-01-18 DIAGNOSIS — M10.9 GOUT, UNSPECIFIED: ICD-10-CM

## 2024-01-18 DIAGNOSIS — N18.30 CHRONIC KIDNEY DISEASE, STAGE 3 UNSPECIFIED: ICD-10-CM

## 2024-01-18 DIAGNOSIS — E11.9 TYPE 2 DIABETES MELLITUS WITHOUT COMPLICATIONS: ICD-10-CM

## 2024-01-18 DIAGNOSIS — I65.29 OCCLUSION AND STENOSIS OF UNSPECIFIED CAROTID ARTERY: Chronic | ICD-10-CM

## 2024-01-18 DIAGNOSIS — Z90.710 ACQUIRED ABSENCE OF BOTH CERVIX AND UTERUS: Chronic | ICD-10-CM

## 2024-01-18 DIAGNOSIS — Z29.9 ENCOUNTER FOR PROPHYLACTIC MEASURES, UNSPECIFIED: ICD-10-CM

## 2024-01-18 DIAGNOSIS — I16.0 HYPERTENSIVE URGENCY: ICD-10-CM

## 2024-01-18 DIAGNOSIS — D25.9 LEIOMYOMA OF UTERUS, UNSPECIFIED: Chronic | ICD-10-CM

## 2024-01-18 LAB
ANION GAP SERPL CALC-SCNC: 12 MMOL/L — SIGNIFICANT CHANGE UP (ref 5–17)
BASOPHILS # BLD AUTO: 0.08 K/UL — SIGNIFICANT CHANGE UP (ref 0–0.2)
BASOPHILS NFR BLD AUTO: 0.9 % — SIGNIFICANT CHANGE UP (ref 0–2)
BUN SERPL-MCNC: 28 MG/DL — HIGH (ref 7–23)
CALCIUM SERPL-MCNC: 10.9 MG/DL — HIGH (ref 8.4–10.5)
CHLORIDE SERPL-SCNC: 103 MMOL/L — SIGNIFICANT CHANGE UP (ref 96–108)
CO2 SERPL-SCNC: 23 MMOL/L — SIGNIFICANT CHANGE UP (ref 22–31)
CREAT SERPL-MCNC: 1.33 MG/DL — HIGH (ref 0.5–1.3)
EGFR: 40 ML/MIN/1.73M2 — LOW
EOSINOPHIL # BLD AUTO: 0.34 K/UL — SIGNIFICANT CHANGE UP (ref 0–0.5)
EOSINOPHIL NFR BLD AUTO: 3.7 % — SIGNIFICANT CHANGE UP (ref 0–6)
GLUCOSE BLDC GLUCOMTR-MCNC: 226 MG/DL — HIGH (ref 70–99)
GLUCOSE BLDC GLUCOMTR-MCNC: 242 MG/DL — HIGH (ref 70–99)
GLUCOSE SERPL-MCNC: 136 MG/DL — HIGH (ref 70–99)
HCT VFR BLD CALC: 38.7 % — SIGNIFICANT CHANGE UP (ref 34.5–45)
HGB BLD-MCNC: 13.5 G/DL — SIGNIFICANT CHANGE UP (ref 11.5–15.5)
IMM GRANULOCYTES NFR BLD AUTO: 0.4 % — SIGNIFICANT CHANGE UP (ref 0–0.9)
LYMPHOCYTES # BLD AUTO: 3.12 K/UL — SIGNIFICANT CHANGE UP (ref 1–3.3)
LYMPHOCYTES # BLD AUTO: 34.1 % — SIGNIFICANT CHANGE UP (ref 13–44)
MCHC RBC-ENTMCNC: 26.5 PG — LOW (ref 27–34)
MCHC RBC-ENTMCNC: 34.9 GM/DL — SIGNIFICANT CHANGE UP (ref 32–36)
MCV RBC AUTO: 76 FL — LOW (ref 80–100)
MONOCYTES # BLD AUTO: 0.9 K/UL — SIGNIFICANT CHANGE UP (ref 0–0.9)
MONOCYTES NFR BLD AUTO: 9.8 % — SIGNIFICANT CHANGE UP (ref 2–14)
NEUTROPHILS # BLD AUTO: 4.68 K/UL — SIGNIFICANT CHANGE UP (ref 1.8–7.4)
NEUTROPHILS NFR BLD AUTO: 51.1 % — SIGNIFICANT CHANGE UP (ref 43–77)
NRBC # BLD: 0 /100 WBCS — SIGNIFICANT CHANGE UP (ref 0–0)
PLATELET # BLD AUTO: 195 K/UL — SIGNIFICANT CHANGE UP (ref 150–400)
POTASSIUM SERPL-MCNC: 4.6 MMOL/L — SIGNIFICANT CHANGE UP (ref 3.5–5.3)
POTASSIUM SERPL-SCNC: 4.6 MMOL/L — SIGNIFICANT CHANGE UP (ref 3.5–5.3)
RBC # BLD: 5.09 M/UL — SIGNIFICANT CHANGE UP (ref 3.8–5.2)
RBC # FLD: 15 % — HIGH (ref 10.3–14.5)
SODIUM SERPL-SCNC: 138 MMOL/L — SIGNIFICANT CHANGE UP (ref 135–145)
TROPONIN T, HIGH SENSITIVITY RESULT: 23 NG/L — SIGNIFICANT CHANGE UP (ref 0–51)
WBC # BLD: 9.16 K/UL — SIGNIFICANT CHANGE UP (ref 3.8–10.5)
WBC # FLD AUTO: 9.16 K/UL — SIGNIFICANT CHANGE UP (ref 3.8–10.5)

## 2024-01-18 PROCEDURE — 96372 THER/PROPH/DIAG INJ SC/IM: CPT

## 2024-01-18 PROCEDURE — 99214 OFFICE O/P EST MOD 30 MIN: CPT | Mod: 25,GC

## 2024-01-18 PROCEDURE — 71045 X-RAY EXAM CHEST 1 VIEW: CPT | Mod: 26

## 2024-01-18 PROCEDURE — 93010 ELECTROCARDIOGRAM REPORT: CPT

## 2024-01-18 PROCEDURE — G2211 COMPLEX E/M VISIT ADD ON: CPT

## 2024-01-18 PROCEDURE — 99291 CRITICAL CARE FIRST HOUR: CPT

## 2024-01-18 PROCEDURE — 99223 1ST HOSP IP/OBS HIGH 75: CPT | Mod: GC

## 2024-01-18 RX ORDER — LANOLIN ALCOHOL/MO/W.PET/CERES
3 CREAM (GRAM) TOPICAL AT BEDTIME
Refills: 0 | Status: DISCONTINUED | OUTPATIENT
Start: 2024-01-18 | End: 2024-01-20

## 2024-01-18 RX ORDER — ALLOPURINOL 300 MG
0 TABLET ORAL
Refills: 0 | DISCHARGE

## 2024-01-18 RX ORDER — ENOXAPARIN SODIUM 100 MG/ML
40 INJECTION SUBCUTANEOUS EVERY 24 HOURS
Refills: 0 | Status: DISCONTINUED | OUTPATIENT
Start: 2024-01-18 | End: 2024-01-19

## 2024-01-18 RX ORDER — LABETALOL HCL 100 MG
20 TABLET ORAL ONCE
Refills: 0 | Status: DISCONTINUED | OUTPATIENT
Start: 2024-01-18 | End: 2024-01-18

## 2024-01-18 RX ORDER — LOSARTAN POTASSIUM 100 MG/1
100 TABLET, FILM COATED ORAL DAILY
Refills: 0 | Status: DISCONTINUED | OUTPATIENT
Start: 2024-01-18 | End: 2024-01-20

## 2024-01-18 RX ORDER — GLIPIZIDE/METFORMIN HCL 2.5-500 MG
0 TABLET ORAL
Qty: 0 | Refills: 0 | DISCHARGE

## 2024-01-18 RX ORDER — METOPROLOL TARTRATE 50 MG
50 TABLET ORAL DAILY
Refills: 0 | Status: DISCONTINUED | OUTPATIENT
Start: 2024-01-18 | End: 2024-01-18

## 2024-01-18 RX ORDER — INSULIN LISPRO 100/ML
VIAL (ML) SUBCUTANEOUS AT BEDTIME
Refills: 0 | Status: DISCONTINUED | OUTPATIENT
Start: 2024-01-18 | End: 2024-01-19

## 2024-01-18 RX ORDER — LABETALOL HCL 100 MG
20 TABLET ORAL ONCE
Refills: 0 | Status: COMPLETED | OUTPATIENT
Start: 2024-01-18 | End: 2024-01-18

## 2024-01-18 RX ORDER — INFLUENZA VIRUS VACCINE 15; 15; 15; 15 UG/.5ML; UG/.5ML; UG/.5ML; UG/.5ML
0.7 SUSPENSION INTRAMUSCULAR ONCE
Refills: 0 | Status: DISCONTINUED | OUTPATIENT
Start: 2024-01-18 | End: 2024-01-20

## 2024-01-18 RX ORDER — AMLODIPINE BESYLATE 2.5 MG/1
1 TABLET ORAL
Qty: 0 | Refills: 0 | DISCHARGE

## 2024-01-18 RX ORDER — METOPROLOL TARTRATE 50 MG
100 TABLET ORAL DAILY
Refills: 0 | Status: DISCONTINUED | OUTPATIENT
Start: 2024-01-19 | End: 2024-01-20

## 2024-01-18 RX ORDER — DOXAZOSIN MESYLATE 4 MG
8 TABLET ORAL AT BEDTIME
Refills: 0 | Status: DISCONTINUED | OUTPATIENT
Start: 2024-01-18 | End: 2024-01-18

## 2024-01-18 RX ORDER — LABETALOL HCL 100 MG
10 TABLET ORAL ONCE
Refills: 0 | Status: COMPLETED | OUTPATIENT
Start: 2024-01-18 | End: 2024-01-18

## 2024-01-18 RX ORDER — SODIUM CHLORIDE 9 MG/ML
1000 INJECTION, SOLUTION INTRAVENOUS
Refills: 0 | Status: DISCONTINUED | OUTPATIENT
Start: 2024-01-18 | End: 2024-01-20

## 2024-01-18 RX ORDER — AMLODIPINE BESYLATE 2.5 MG/1
10 TABLET ORAL DAILY
Refills: 0 | Status: DISCONTINUED | OUTPATIENT
Start: 2024-01-18 | End: 2024-01-18

## 2024-01-18 RX ORDER — ASPIRIN/CALCIUM CARB/MAGNESIUM 324 MG
1 TABLET ORAL
Qty: 0 | Refills: 0 | DISCHARGE

## 2024-01-18 RX ORDER — OXYCODONE HYDROCHLORIDE 5 MG/1
0.5 TABLET ORAL
Refills: 0 | DISCHARGE

## 2024-01-18 RX ORDER — GLUCAGON INJECTION, SOLUTION 0.5 MG/.1ML
1 INJECTION, SOLUTION SUBCUTANEOUS ONCE
Refills: 0 | Status: DISCONTINUED | OUTPATIENT
Start: 2024-01-18 | End: 2024-01-20

## 2024-01-18 RX ORDER — LOSARTAN POTASSIUM 100 MG/1
100 TABLET, FILM COATED ORAL DAILY
Refills: 0 | Status: DISCONTINUED | OUTPATIENT
Start: 2024-01-18 | End: 2024-01-18

## 2024-01-18 RX ORDER — ATENOLOL 25 MG/1
1 TABLET ORAL
Qty: 0 | Refills: 0 | DISCHARGE

## 2024-01-18 RX ORDER — DEXTROSE 50 % IN WATER 50 %
25 SYRINGE (ML) INTRAVENOUS ONCE
Refills: 0 | Status: DISCONTINUED | OUTPATIENT
Start: 2024-01-18 | End: 2024-01-20

## 2024-01-18 RX ORDER — LABETALOL HCL 100 MG
100 TABLET ORAL ONCE
Refills: 0 | Status: COMPLETED | OUTPATIENT
Start: 2024-01-18 | End: 2024-01-18

## 2024-01-18 RX ORDER — TERAZOSIN HYDROCHLORIDE 10 MG/1
1 CAPSULE ORAL
Qty: 0 | Refills: 0 | DISCHARGE

## 2024-01-18 RX ORDER — INSULIN GLARGINE 100 [IU]/ML
10 INJECTION, SOLUTION SUBCUTANEOUS AT BEDTIME
Refills: 0 | Status: DISCONTINUED | OUTPATIENT
Start: 2024-01-18 | End: 2024-01-19

## 2024-01-18 RX ORDER — FUROSEMIDE 40 MG
1 TABLET ORAL
Qty: 0 | Refills: 0 | DISCHARGE

## 2024-01-18 RX ORDER — ACETAMINOPHEN 500 MG
650 TABLET ORAL EVERY 6 HOURS
Refills: 0 | Status: DISCONTINUED | OUTPATIENT
Start: 2024-01-18 | End: 2024-01-20

## 2024-01-18 RX ORDER — INSULIN DETEMIR 100/ML (3)
0 INSULIN PEN (ML) SUBCUTANEOUS
Qty: 0 | Refills: 0 | DISCHARGE

## 2024-01-18 RX ORDER — LOVASTATIN 20 MG
1 TABLET ORAL
Qty: 0 | Refills: 0 | DISCHARGE

## 2024-01-18 RX ORDER — DOXAZOSIN MESYLATE 4 MG
8 TABLET ORAL AT BEDTIME
Refills: 0 | Status: DISCONTINUED | OUTPATIENT
Start: 2024-01-19 | End: 2024-01-20

## 2024-01-18 RX ORDER — AMLODIPINE BESYLATE 2.5 MG/1
10 TABLET ORAL EVERY 24 HOURS
Refills: 0 | Status: DISCONTINUED | OUTPATIENT
Start: 2024-01-19 | End: 2024-01-20

## 2024-01-18 RX ORDER — DEXTROSE 50 % IN WATER 50 %
12.5 SYRINGE (ML) INTRAVENOUS ONCE
Refills: 0 | Status: DISCONTINUED | OUTPATIENT
Start: 2024-01-18 | End: 2024-01-20

## 2024-01-18 RX ORDER — FENOFIBRATE,MICRONIZED 130 MG
1 CAPSULE ORAL
Qty: 0 | Refills: 0 | DISCHARGE

## 2024-01-18 RX ORDER — ASPIRIN/CALCIUM CARB/MAGNESIUM 324 MG
81 TABLET ORAL DAILY
Refills: 0 | Status: DISCONTINUED | OUTPATIENT
Start: 2024-01-18 | End: 2024-01-20

## 2024-01-18 RX ORDER — ALLOPURINOL 300 MG
100 TABLET ORAL DAILY
Refills: 0 | Status: DISCONTINUED | OUTPATIENT
Start: 2024-01-18 | End: 2024-01-20

## 2024-01-18 RX ORDER — DEXTROSE 50 % IN WATER 50 %
15 SYRINGE (ML) INTRAVENOUS ONCE
Refills: 0 | Status: DISCONTINUED | OUTPATIENT
Start: 2024-01-18 | End: 2024-01-20

## 2024-01-18 RX ORDER — ONDANSETRON 8 MG/1
4 TABLET, FILM COATED ORAL EVERY 8 HOURS
Refills: 0 | Status: DISCONTINUED | OUTPATIENT
Start: 2024-01-18 | End: 2024-01-20

## 2024-01-18 RX ADMIN — Medication 10 MILLIGRAM(S): at 15:22

## 2024-01-18 RX ADMIN — Medication 0.1 MILLIGRAM(S): at 14:34

## 2024-01-18 RX ADMIN — Medication 100 MILLIGRAM(S): at 13:55

## 2024-01-18 RX ADMIN — Medication 10 MILLIGRAM(S): at 14:16

## 2024-01-18 RX ADMIN — Medication 20 MILLIGRAM(S): at 13:07

## 2024-01-18 RX ADMIN — ENOXAPARIN SODIUM 40 MILLIGRAM(S): 100 INJECTION SUBCUTANEOUS at 17:51

## 2024-01-18 NOTE — REVIEW OF SYSTEMS
[Vision Problems] : no vision problems [Chest Pain] : no chest pain [Shortness Of Breath] : no shortness of breath [Wheezing] : no wheezing [Abdominal Pain] : no abdominal pain [Nausea] : no nausea [Vomiting] : no vomiting [Dysuria] : no dysuria [Confusion] : no confusion [Unsteady Walking] : no ataxia

## 2024-01-18 NOTE — ED ADULT TRIAGE NOTE - ARRIVAL INFO ADDITIONAL COMMENTS
pt sent from doctor's office for eval of high bp. pt denies chest pain, sob, dizziness. pt reports compliant with BP meds

## 2024-01-18 NOTE — HISTORY OF PRESENT ILLNESS
[FreeTextEntry1] : BP follow up [de-identified] : 78F PMH resistant HTN, HLD, T2DM, diabetic retinopathy, HFpEF, CKD (Cr 1.8-3), carotid stenosis (s/p endarterectomy), gout, anemia, RLE neuropathy who presents for HTN follow up. At last visit, HCTZ was decreased due to gout flare. Pt was started on clonidine 0.2mg/24h weekly patch. Pt followed up with her nephrologist (Dr. Pearson) at [facility], where clonidine was changed to 0.2mg PO BID. Pt states compliance to all medications, without missed doses. Last dose this morning 3 hours prior to appointment. Urine metanephrines normal last visit, however, pt did not obtain renal US. Denies headache, vision change, CP, weakness, or confusion. Home readings range from 130-190 systolic.

## 2024-01-18 NOTE — H&P ADULT - PROBLEM SELECTOR PLAN 1
Pt w/ h/o of resistant HTN, followed by nephrology and with extensive outpatient work-up. Pheo r/o; urine metanephrine negative. Hyperaldosteronism r/o as Renin/Aldosterone ratio 5.84. Pending outpatient renal artery u/s to r/o JUVENTINO. On manual BP read, pt w/ discordant SBP by 20-30 mmHg. Known h/o PAD with 70% SHERIE stenosis, s/p stent. Trop neg.   Endorses compliance with all medications; clonidine 0.2 mg q12, HCTZ 12.5 mg qd, Toprol 50 mg qd, amlodipine 10 mg qd, Losartan 100 mg qd, terazosin 10 mg qd.  - continue with home meds  - monitor for s/sx's of hypertensive urgency  - f/u CXR

## 2024-01-18 NOTE — ED PROVIDER NOTE - CLINICAL SUMMARY MEDICAL DECISION MAKING FREE TEXT BOX
78-year-old with labile hypertension, with incidentally elevated blood pressure prior to arrival, patient with normal neuro exam and asymptomatic, however blood pressure as high as 270s over 150s prior to arrival, given labetalol with good effect, plan to admit for blood pressure monitoring.  Labs sent to assess for hypertensive emergency, no headache or neuro symptoms.

## 2024-01-18 NOTE — ED ADULT NURSE NOTE - NS ED NURSE REPORT GIVEN TO FT
ER rajeev Worthington called 3x w/ no answer, facesheet and DASA form completed and sent up to unit with Pt and txp called 4x w/ no answer, facesheet and DASA form completed and sent up to unit with Pt and txp

## 2024-01-18 NOTE — H&P ADULT - PROBLEM SELECTOR PLAN 4
Electrolytes: replete as necessary  Nutrition: regular   DVT: lovenox 40 mg subq  Code status: Full   Disposition: RMF > home Pt w/ h/o CKD stage 3, followed by nephrology Dr. Pearson. Baseline Cr 1.29, on admission 1.33.  - Continue to trend Cr  - Avoid nephrotoxic agents  - Adjust medication dosages for level of renal function

## 2024-01-18 NOTE — ED ADULT NURSE REASSESSMENT NOTE - NS ED NURSE REASSESS COMMENT FT1
Upon patient arrival to [facility], receiving RN called ED Holding for report. Report given by Elin GATES RN. Vitals stable prior to transfer. Safety maintained, all needs met.

## 2024-01-18 NOTE — ED PROVIDER NOTE - WET READ LAUNCH FT
ICU End of Shift Summary. See flowsheets for vital signs and detailed assessment.    Changes this shift: Lung sounds diminished.  CBI continues to produce small clots.    Plan:   Continue bladder irrigation.  Monitor bradycardia.  Administer ABXs to treat previous positive blood culture.       There are no Wet Read(s) to document.

## 2024-01-18 NOTE — ED PROVIDER NOTE - INTERNATIONAL TRAVEL
Date of Consultation:  09/05/2018



Consulting Physician:  ER/Dr. Ro hospitalist.



Reason For Consultation:  Acidosis, electrolyte imbalance.  End-stage renal disease.



History Of Present Illness:  This is an 80-year-old gentleman, well known to me from the previous adm
ission and from the dialysis unit with significant past medical history of end-stage renal disease se
condary to diabetes, nephropathy confirmed with biopsy, with global sclerosis more than 70%, hyperten
jayleen, hyperlipidemia, coronary artery disease, end-stage renal disease on hemodialysis, Monday, Wedne
sday, Friday at [facility] through [facility], right IJ.  The patient apparently mi
ssed his dialysis on Monday, came to the hospital with altered mental status, found to have severe hy
poglycemia, blood sugar down to the 30, and severe acidosis.  Bicarb down to 8.  For that reason, we 
have been consulted.  The patient denied any recent change in his medication.  The patient still has 
good urine output.  Denies any fever or chills.  Has marginal hyperkalemia of 5.1.



Allergies:  TO ADHESIVE.



Home Medications:  Include metoprolol 12.5, multivitamin, Lasix 40 mg q.6, gabapentin 300 at bedtime,
 Norvasc 10 mg daily, aspirin, etodolac 300 b.i.d., lisinopril 40 mg daily, metformin, triamcinolone.




Past Medical History:  Include,

1.Hyperlipidemia.

2.Hypertension.

3.Diabetes complicated with neuropathy and nephropathy.

4.End-stage renal disease, on hemodialysis.

5.Myasthenia gravis.

6.Cataract.

7.Prostate surgery.



Family History:  Positive for coronary artery disease and hypertension/diabetes.



Social History:  Denies smoking, denies drinking, denies drug abuse.



Review of Systems:

Head and Neck:  No red eye.  No ear pain. 

GI:  Decreased intake. 

:  No polyuria.  No dysuria.  No hematuria. 

GYN:  Not applicable. 

Respiratory:  No shortness of breath. 

Cardiovascular:  No chest pain. 

Endocrine:  Hypoglycemia.  No polydipsia. 

Musculoskeletal:  No joint pain. 

Skin:  No rash.



Physical Examination:

Vital Signs:  When I saw the patient, the patient lying in bed.  Blood pressure 106/60, pulse of 55. 
 Afebrile. 

Chest:  Clear to auscultation.  Has eschar on the left lower neck.  Has right IJ PermCath.  Has been 
accessed by the ER. 

Heart:  S1, S2.  Systolic murmur. 

Abdomen:  Soft, nontender. 

Chest:  Clear to auscultation. 

Extremities:  Plus edema. 

Neurologic:  Alert.  Nonfocal.



Laboratory Data:  WBC 11, H and H 9.9/31.1, platelets 412.  ABG; pH 7.13, CO2 of 16, O2 141, base acc
ess -22.  Sodium 130, potassium 5.1, bicarb 8, chloride 96, BUN 38, creatinine 4.5, calcium of 9.  CK
 of 74, albumin 3.2.  INR 1.1.



Assessment And Plan:  End-stage renal disease with severe acidosis, marginal hyperkalemia.  Missed di
alysis.  Last dialysis, last Friday, normal volume with hypoglycemia.

1.I am going to go ahead and arrange for urgent dialysis.

2.Start the patient on bicarb drip.  Whenever the patient started on dialysis, we can discontinue th
e bicarb drip and start the patient on the D10 at that time to maintain the blood sugar.

3.Marginal hyperkalemia.  Going to be corrected on dialysis.

4.High anion gap, metabolic acidosis, secondary to end-stage renal disease/metformin use.  Going to 
be corrected on dialysis as above.  We will start the patient on bicarb drip to be discontinued after
 resuming the dialysis.

5.Hypoglycemia with marginal hyperkalemia and acidosis, raise the possibility of possible adrenal in
sufficiency.  I am going to send for cortisol level and will follow up the patient.

6.Hypoglycemia secondary possible to metformin and renal failure.  Discontinue metformin.

7.Hypertension, currently blood pressure on the lower side.  Discontinue all blood pressure medicati
on.

8.Hyperkalemia, as above.

9.Diabetes, as by primary.

10.Hypotension shock, could be secondary to adrenal insufficiency, given the acidosis.  Septic need 
to be ruled out.  We will send for culture and we will empirically start the patient on treatment. 

Thank you Dr. Ro for allowing us to participate in the care of your patient.





MA/YOLA

DD:  09/05/2018 15:09:52Voice ID:  [id number]

DT:  09/05/2018 19:33:46Report ID:  [id number]
Date of Consultation:  09/11/2018



Reason For Consult:  Nonhealing wound, necrotic wound over the left neck area.



History Of Present Illness:  This is the case of a male who is in the hospital for medical problems i
ncluding altered mental status, found to have a large necrotic ulcer on the left lower neck region, r
ight just above the clavicle with foul-smelling, and a surgical consult was obtained for debridement.
  The patient is not sure how that happened.  He thinks it was done by pressure from a strap he has i
n that area to hold the oxygen, but information from him is somehow nonspecific.



Review of Systems:

Ten points otherwise unremarkable.



Family History:  Heart disease, hypertension, diabetes.



Medical Problems:  Include cataracts, renal failure on dialysis, diabetes, hypertension.



Past Surgical History:  Surgeries include finger surgery, prostate surgery.



Social History:  He does not smoke, he does not drink alcohol.



Physical Examination:

General:  The patient is awake and alert. 

HEENT:  Pupils anicteric. 

Neck:  Supple.  At the base of the neck just above the clavicle, the patient has about a 15 x 6 cm op
en wound with necrotic tissue.  It is infected with some fluctuance present that needs to be debrided
.  There is some smelling coming from the area.  Etiology of that is unknown. 

Chest:  Bilateral breath sounds.  The patient has a catheter on the right side. 

Abdomen:  Soft and depressible.  No guarding or rebound.



Laboratory Data:  Blood work shows a WBC count of 9.4 with hemoglobin of 8.7.  INR 1.12.  Glucose is 
198.



Assessment:  This is an 80-year-old patient with necrotic ulcer on the left neck region needs to be s
urgical debrided.  Benefits, alternatives, and risks were explained to the patient, which include but
 are not limited to infection, bleeding, damage to adjacent structures, anesthesia complication, nonh
ealing wound, MI, and even death.  He will require wound care.  He understood.  He will be kept n.p.o
. after midnight.





NOEL/YOLA

DD:  09/12/2018 10:15:42Voice ID:  [id number]

DT:  09/12/2018 14:55:20Report ID:  [id number]
History Of Present Illness:  This is an 80-year-old male, I was consulted for wound to the the left n
elliott side and also with sepsis.  The patient was initially brought in on 09/05/2018 for altered mental
 status.  The patient's sugar was found to be 30 at that time.  The patient has been recently started
 on dialysis for 2 weeks prior to admission.  Has significant past medical history of hypertension, d
iabetes mellitus, hyperlipidemia, end-stage renal disease, myasthenia gravis, bilateral cataract, pro
state surgery, finger surgery.  Denies any headache, nausea, vomiting, chest pain, abdominal pain, co
nstipation, or diarrhea.



Past Medical History:  As per HPI.



Surgical History:  Nondrinker.



Family History:  Noncontributory, except heart problems, hypertension, diabetes mellitus, and kidney 
diease.



Medications:  Vancomycin.  See MARS for other medication.



Allergies:  ADHESIVE TAPE, METFORMIN.



Review of Systems:

A 10-point review was performed.



Physical Examination:

General:  This is an 80-year-old male, lying in bed, not in any acute cardiopulmonary distress. 

Vital Signs:  Temperature 97, pulse 77, respiration 18, blood pressure 153/72.  HEENT:  Unremarkable.
 

Neck:  Supple. 

Lungs:  Basal crackles. 

Heart:  S1, S2.  Regular. 

Abdomen:  Soft, nontender.  Bowel sounds positive. 

Extremity:  Left arm with PICC line, right subclavian area with the Alden catheter in place. 



Neck site shows large wound with possible fascia and slough noted in that area.



Laboratory Data:  Shows WBC 9.9, hemoglobin 8.4, platelets 324.  Chemistry shows sodium 135, potassiu
m 3.5, chloride 103, bicarb 21, BUN 32, creatinine 2, glucose 145.  Microdata is growing Staph aureus
 and MRSA in the neck wound site.  Abdominal CT done on 09/09/2018 shows no acute finding.



Assessment And Plan:  An 80-year-old male who was initially admitted for hypoglycemia, altered mental
 status.  Recently started on dialysis.  Neck wound.  Recommend to apply Medihoney to the wound site 
daily and have wound care clinic follow the patient.  Continue antibiotic and supportive care.  Consi
s[facility] long-term acute care for the patient's infected wound.  We will follow the patient closely.





NF/YOLA

DD:  09/15/2018 11:43:11Voice ID:  [id number]

DT:  09/15/2018 13:08:42Report ID:  [id number]
No

## 2024-01-18 NOTE — ED ADULT NURSE REASSESSMENT NOTE - NS ED NURSE REASSESS COMMENT FT1
Written hand off given to UNIT 7 URIS; called floor to make them aware. attempted to give report x4 times over the course of 1.5 hrs. Pt denies complaints at this time cp, sob, headache, vision changes. a/ox4. Pt sent up to room in stretcher with transporter and in possession of all personal belongings. Safety maintained, all needs met.

## 2024-01-18 NOTE — ED ADULT NURSE NOTE - CHPI ED NUR SYMPTOMS NEG
no back pain/no chest pain/no chills/no congestion/no diaphoresis/no dizziness/no fever/no nausea/no shortness of breath/no syncope/no vomiting No

## 2024-01-18 NOTE — ED PROVIDER NOTE - OBJECTIVE STATEMENT
78-year-old with hypertension hyperlipidemia and diabetes, patient reports history of labile hypertension she has blood pressures in the 130s as high as the 200s at home frequently without any symptoms, patient went to a outpatient follow-up appointment today had her blood pressure checked and it was extremely elevated and sent here by ambulance, patient reports she was completely asymptomatic then as well as now and she did take her blood pressure meds today.   usual meds clonidine 0.2 BID, Hctz 12.5, metoprolol suc 50mg amylodipine 10 mg , losartan 100mg , terazosin 10 mg, allopurinol 100mg , oxycodone 2.5 mg nightly for knee pain ,  BP by EMS prior to arrival   10:56 am 251/130   11:05 am , 277/149

## 2024-01-18 NOTE — H&P ADULT - PROBLEM SELECTOR PLAN 3
Known history of DM2. On home farxiga 10 mg qd, lantus 30 u qhs   - Obtain A1c  - moderate ISF-25 lispro sliding scale AC+qHS  - monitor FS  - consistent carb diet Known history of DM2. On home farxiga 10 mg qd, lantus 30 u qhs   - moderate ISF-25 lispro sliding scale AC+qHS  - monitor FS  - consistent carb diet Known history of DM2. On home farxiga 10 mg qd, lantus 30 u qhs   - moderate ISF-25 lispro sliding scale AC+qHS  - lantus 30 u qhs  - monitor FS  - consistent carb diet

## 2024-01-18 NOTE — ED ADULT NURSE NOTE - NSFALLHARMRISKINTERV_ED_ALL_ED

## 2024-01-18 NOTE — ED ADULT NURSE NOTE - OBJECTIVE STATEMENT
78yF pmhx HTN, HLD, type 2 diabetes presents to ER complaining of HTN for 1 week. Pt states that her she usually has a systolic BP in the 130's, over the last week her BP has slowly increased. She has been working with her MD over 3 months to manage her BP with medications. Denies any current symptoms no changes in vision, no balance issues, numbness tingling, changes in sensation, SOB/CP, N/V/D, F/C.

## 2024-01-18 NOTE — H&P ADULT - PROBLEM SELECTOR PLAN 2
pt w/ h/o of gout, home meds allopurinol 100 mg qd and oxycodone 2.5 mg qhs prn for knee pain  - continue with home meds pt w/ h/o of gout, home meds allopurinol 100 mg qd   - continue with home meds

## 2024-01-18 NOTE — H&P ADULT - ASSESSMENT
79 y/o F w/ PMHx of resistant HTN (follows with nephrologist Dr. Pearson at [place]), HLD, PAD admitted for management of hypertensive urgency.

## 2024-01-18 NOTE — ED PROVIDER NOTE - CARDIAC, MLM
Normal rate, regular rhythm.  Heart sounds S1, S2.  No murmurs, rubs or gallops. Private Auto Walk in

## 2024-01-18 NOTE — H&P ADULT - NSHPLABSRESULTS_GEN_ALL_CORE
LABS:                         13.5   9.16  )-----------( 195      ( 18 Jan 2024 12:58 )             38.7     01-18    138  |  103  |  28<H>  ----------------------------<  136<H>  4.6   |  23  |  1.33<H>    Ca    10.9<H>      18 Jan 2024 12:58        Urinalysis Basic - ( 18 Jan 2024 12:58 )    Color: x / Appearance: x / SG: x / pH: x  Gluc: 136 mg/dL / Ketone: x  / Bili: x / Urobili: x   Blood: x / Protein: x / Nitrite: x   Leuk Esterase: x / RBC: x / WBC x   Sq Epi: x / Non Sq Epi: x / Bacteria: x            RADIOLOGY, EKG & ADDITIONAL TESTS: Reviewed.

## 2024-01-18 NOTE — H&P ADULT - HISTORY OF PRESENT ILLNESS
77 y/o F w/ PMHx of resistant HTN (follows with nephrologist Dr. Pearson at [place]), HLD, NIDDM, CKD stage 3, PAD with R carotid stenting who presents from PCP/[facility] due to elevated /150s, however asymptomatic. She denies any HA, vision changes, CP, SOB, n/v, abdominal pain, numbness or tingling. Ambulatory BP monitoring is labile usually reads SBP 130s in am and 190s in pm, uses wrist cuff for measuring. Recently, clonidine 0.2 mg patch qwk changed to 0.2 mg q12. Endorses compliance with all medications; clonidine 0.2 mg q12, HCTZ 12.5 mg qd, Toprol 50 mg qd, amlodipine 10 mg qd, Losartan 100 mg qd, terazosin 10 mg qd. Of note, pt with extensive outpatient work-up, pheo and hyperaldosteronism r/o.     In the ED:  Initial vital signs: T: 98.7 F, HR: 111 > 73, BP: 248/112 > 170/68 labetalol x1, 201/88 > 160/68 after labetalol x2, R: 18, SpO2: 96% on RA  Labs: significant for BUN/Cr 28/1.33 (1.97 7/2023), GFR 40, neg trop  EKG:   Medications: labetalol 10 mg x2, 20 mg x1 IVP, 100 mg PO x1, clonidine 0.1 mg x1  Consults: none    79 y/o F w/ PMHx of resistant HTN (follows with nephrologist Dr. Pearson at [place]), HLD, NIDDM, CKD stage 3, PAD with R carotid stenting who presents from PCP/[facility] due to elevated /150s, however asymptomatic. She denies any HA, vision changes, CP, SOB, n/v, abdominal pain, numbness or tingling. Ambulatory BP monitoring is labile usually reads SBP 130s in am and 190s in pm, uses wrist cuff for measuring. Recently, clonidine 0.2 mg patch qwk changed to 0.2 mg q12. Endorses compliance with all medications; clonidine 0.2 mg q12, HCTZ 12.5 mg qd, Toprol 50 mg qd, amlodipine 10 mg qd, Losartan 100 mg qd, terazosin 10 mg qd. Of note, pt with extensive outpatient work-up, pheo and hyperaldosteronism r/o.     In the ED:  Initial vital signs: T: 98.7 F, HR: 111 > 73, BP: 248/112 > 170/68 labetalol x1, 201/88 > 160/68 after labetalol x2, R: 18, SpO2: 96% on RA  Labs: significant for BUN/Cr 28/1.33 (1.97 7/2023), GFR 40, neg trop  EKG: Sinus, RBBB, no ischemia, no prior EKG   Medications: labetalol 10 mg x2, 20 mg x1 IVP, 100 mg PO x1, clonidine 0.1 mg x1  Consults: none    77 y/o F w/ PMHx of resistant HTN (follows with nephrologist Dr. Pearson at [place]), HLD, IDDM type 2, CKD stage 3, PAD s/p R endarterectomy with stenting who presents from PCP/[facility] due to elevated /150s, however asymptomatic. She denies any HA, vision changes, CP, SOB, n/v, abdominal pain, numbness or tingling. Ambulatory BP monitoring is labile usually reads SBP 130s in am and 190s in pm, uses wrist cuff for measuring. Recently, clonidine 0.2 mg patch qwk changed to 0.2 mg q12. Endorses compliance with all medications; clonidine 0.2 mg q12, HCTZ 12.5 mg qd, Toprol 50 mg qd, amlodipine 10 mg qd, Losartan 100 mg qd, terazosin 10 mg qd. Of note, pt with extensive outpatient work-up, pheo and hyperaldosteronism r/o.     In the ED:  Initial vital signs: T: 98.7 F, HR: 111 > 73, BP: 248/112 > 170/68 labetalol x1, 201/88 > 160/68 after labetalol x2, R: 18, SpO2: 96% on RA  Labs: significant for BUN/Cr 28/1.33 (1.97 7/2023), GFR 40, neg trop  EKG: Sinus, RBBB, no ischemia, no prior EKG   Medications: labetalol 10 mg x2, 20 mg x1 IVP, 100 mg PO x1, clonidine 0.1 mg x1  Consults: none

## 2024-01-18 NOTE — PHYSICAL EXAM
[Normal Sclera/Conjunctiva] : normal sclera/conjunctiva [Normal Outer Ear/Nose] : the outer ears and nose were normal in appearance [Supple] : supple [Soft] : abdomen soft [Non Tender] : non-tender [Non-distended] : non-distended [Normal Bowel Sounds] : normal bowel sounds [Normal] : no CVA or spinal tenderness [Normal Gait] : normal gait [Normal Affect] : the affect was normal [Normal Insight/Judgement] : insight and judgment were intact [de-identified] : Trace bilateral ankle edema [de-identified] : Bilateral forearms with fat pads proximal to wrists. FROM in wrists. Pain with bilateral wrist extension against resistance. +phalen sign. Negative tinel.  strength intact.

## 2024-01-18 NOTE — ASSESSMENT
[FreeTextEntry1] : 78F PMH resistant HTN, HLD, T2DM, diabetic retinopathy, HFpEF, CKD (Cr 1.8-3), carotid stenosis (s/p endarterectomy), gout, anemia, RLE neuropathy who presents for HTN follow up.  Sent to ER for hypertensive urgency. RTC 1 week post-discharge. Seen with Dr. Leon.

## 2024-01-18 NOTE — END OF VISIT
[] : Resident [FreeTextEntry3] :  78 year old F presenting for follow up. Feels well, reports adherence to all meds including clonidine (recently switched from patch to PO tablets by her nephrologist Dr. Pearson). Well appearing on exam. BP extremely high on multiple measurements, asymptomatic. Transferred to ED by EMS for HTN urgency.

## 2024-01-18 NOTE — H&P ADULT - ATTENDING COMMENTS
79 y/o F w/ PMHx of resistant HTN (follows with nephrologist Dr. Pearson at [place]), HLD, PAD, gout admitted for management of hypertensive urgency.     #Hypertensive urgency   #Malignant hypertension   - Presented with BP: 248/112, s/p Clonidine 0.1mg, labetalol 100mg PO, Labetalol 20mg IV x1 and Labetalol 10mg IV x2 in the ED. BP now 146/72.   -Hold all BP meds tonight and resume home meds tomorrow morning with hold parameters   -Was recently switched from clonidine patch to PO. Otherwise states she is compliant with her meds and diet. Hyperaldosteronism was ruled out outpatient.    -Follow CXR  -Monitor blood pressure     #CKD stage 3:  - Cr 1.26 on 12/09/23, 1.33 this admission  -Continue to trend and avoid nephrotoxins      #T2DM  -On Lantus 30 at home, will start on lantus 10U and uptitrate as needed  -ISS+FS    #Gout  -Continue home allopurinol     DVT ppx with lovenox

## 2024-01-18 NOTE — H&P ADULT - NSHPPHYSICALEXAM_GEN_ALL_CORE
PHYSICAL EXAM:  Constitutional: resting comfortably in bed; NAD  Head: NC/AT  Eyes: PERRL, EOMI, anicteric sclera  ENT: no nasal discharge; uvula midline, no oropharyngeal erythema or exudates; MMM  Neck: supple; no JVD or thyromegaly  Respiratory: CTA B/L; no W/R/R, no retractions  Cardiac: +S1/S2; RRR; no M/R/G; PMI non-displaced  Gastrointestinal: abdomen soft, NT/ND; no rebound or guarding  Extremities: WWP, no clubbing or cyanosis; no peripheral edema  Musculoskeletal: NROM x4; no joint swelling, fat pads over dorsal aspect of wrist b/l  Vascular: 2+ radial, DP pulses B/L  Neurologic: AAOx3; no focal deficits  Psychiatric: affect and characteristics of appearance, verbalizations, behaviors are appropriate

## 2024-01-18 NOTE — H&P ADULT - PROBLEM SELECTOR PLAN 5
Fluids: none  Electrolytes: replete as necessary  Nutrition: carb consistent diet  DVT: lovenox  subq  Code status: Full   Disposition: RMF > home

## 2024-01-19 ENCOUNTER — TRANSCRIPTION ENCOUNTER (OUTPATIENT)
Age: 79
End: 2024-01-19

## 2024-01-19 DIAGNOSIS — I16.1 HYPERTENSIVE EMERGENCY: ICD-10-CM

## 2024-01-19 DIAGNOSIS — E83.52 HYPERCALCEMIA: ICD-10-CM

## 2024-01-19 DIAGNOSIS — N17.9 ACUTE KIDNEY FAILURE, UNSPECIFIED: ICD-10-CM

## 2024-01-19 LAB
A1C WITH ESTIMATED AVERAGE GLUCOSE RESULT: 11.3 % — HIGH (ref 4–5.6)
ALBUMIN SERPL ELPH-MCNC: 3.9 G/DL — SIGNIFICANT CHANGE UP (ref 3.3–5)
ALP SERPL-CCNC: 145 U/L — HIGH (ref 40–120)
ALT FLD-CCNC: 13 U/L — SIGNIFICANT CHANGE UP (ref 10–45)
ANION GAP SERPL CALC-SCNC: 10 MMOL/L — SIGNIFICANT CHANGE UP (ref 5–17)
AST SERPL-CCNC: 22 U/L — SIGNIFICANT CHANGE UP (ref 10–40)
BASOPHILS # BLD AUTO: 0.1 K/UL — SIGNIFICANT CHANGE UP (ref 0–0.2)
BASOPHILS NFR BLD AUTO: 1.2 % — SIGNIFICANT CHANGE UP (ref 0–2)
BILIRUB SERPL-MCNC: 0.8 MG/DL — SIGNIFICANT CHANGE UP (ref 0.2–1.2)
BLD GP AB SCN SERPL QL: NEGATIVE — SIGNIFICANT CHANGE UP
BUN SERPL-MCNC: 24 MG/DL — HIGH (ref 7–23)
CALCIUM SERPL-MCNC: 11 MG/DL — HIGH (ref 8.4–10.5)
CHLORIDE SERPL-SCNC: 108 MMOL/L — SIGNIFICANT CHANGE UP (ref 96–108)
CO2 SERPL-SCNC: 24 MMOL/L — SIGNIFICANT CHANGE UP (ref 22–31)
CREAT SERPL-MCNC: 1.4 MG/DL — HIGH (ref 0.5–1.3)
EGFR: 38 ML/MIN/1.73M2 — LOW
EOSINOPHIL # BLD AUTO: 0.38 K/UL — SIGNIFICANT CHANGE UP (ref 0–0.5)
EOSINOPHIL NFR BLD AUTO: 4.4 % — SIGNIFICANT CHANGE UP (ref 0–6)
ESTIMATED AVERAGE GLUCOSE: 278 MG/DL — HIGH (ref 68–114)
GLUCOSE BLDC GLUCOMTR-MCNC: 163 MG/DL — HIGH (ref 70–99)
GLUCOSE BLDC GLUCOMTR-MCNC: 163 MG/DL — HIGH (ref 70–99)
GLUCOSE BLDC GLUCOMTR-MCNC: 180 MG/DL — HIGH (ref 70–99)
GLUCOSE BLDC GLUCOMTR-MCNC: 202 MG/DL — HIGH (ref 70–99)
GLUCOSE BLDC GLUCOMTR-MCNC: 95 MG/DL — SIGNIFICANT CHANGE UP (ref 70–99)
GLUCOSE SERPL-MCNC: 95 MG/DL — SIGNIFICANT CHANGE UP (ref 70–99)
HCT VFR BLD CALC: 38 % — SIGNIFICANT CHANGE UP (ref 34.5–45)
HGB BLD-MCNC: 13 G/DL — SIGNIFICANT CHANGE UP (ref 11.5–15.5)
IMM GRANULOCYTES NFR BLD AUTO: 0.2 % — SIGNIFICANT CHANGE UP (ref 0–0.9)
LYMPHOCYTES # BLD AUTO: 2.78 K/UL — SIGNIFICANT CHANGE UP (ref 1–3.3)
LYMPHOCYTES # BLD AUTO: 32.2 % — SIGNIFICANT CHANGE UP (ref 13–44)
MAGNESIUM SERPL-MCNC: 1.7 MG/DL — SIGNIFICANT CHANGE UP (ref 1.6–2.6)
MCHC RBC-ENTMCNC: 26.9 PG — LOW (ref 27–34)
MCHC RBC-ENTMCNC: 34.2 GM/DL — SIGNIFICANT CHANGE UP (ref 32–36)
MCV RBC AUTO: 78.5 FL — LOW (ref 80–100)
MONOCYTES # BLD AUTO: 0.87 K/UL — SIGNIFICANT CHANGE UP (ref 0–0.9)
MONOCYTES NFR BLD AUTO: 10.1 % — SIGNIFICANT CHANGE UP (ref 2–14)
NEUTROPHILS # BLD AUTO: 4.49 K/UL — SIGNIFICANT CHANGE UP (ref 1.8–7.4)
NEUTROPHILS NFR BLD AUTO: 51.9 % — SIGNIFICANT CHANGE UP (ref 43–77)
NRBC # BLD: 0 /100 WBCS — SIGNIFICANT CHANGE UP (ref 0–0)
PHOSPHATE SERPL-MCNC: 2.5 MG/DL — SIGNIFICANT CHANGE UP (ref 2.5–4.5)
PLATELET # BLD AUTO: 188 K/UL — SIGNIFICANT CHANGE UP (ref 150–400)
POTASSIUM SERPL-MCNC: 4.2 MMOL/L — SIGNIFICANT CHANGE UP (ref 3.5–5.3)
POTASSIUM SERPL-SCNC: 4.2 MMOL/L — SIGNIFICANT CHANGE UP (ref 3.5–5.3)
PROT SERPL-MCNC: 7.2 G/DL — SIGNIFICANT CHANGE UP (ref 6–8.3)
RBC # BLD: 4.84 M/UL — SIGNIFICANT CHANGE UP (ref 3.8–5.2)
RBC # FLD: 15.3 % — HIGH (ref 10.3–14.5)
RH IG SCN BLD-IMP: POSITIVE — SIGNIFICANT CHANGE UP
SODIUM SERPL-SCNC: 142 MMOL/L — SIGNIFICANT CHANGE UP (ref 135–145)
WBC # BLD: 8.64 K/UL — SIGNIFICANT CHANGE UP (ref 3.8–10.5)
WBC # FLD AUTO: 8.64 K/UL — SIGNIFICANT CHANGE UP (ref 3.8–10.5)

## 2024-01-19 PROCEDURE — 99223 1ST HOSP IP/OBS HIGH 75: CPT

## 2024-01-19 PROCEDURE — 99233 SBSQ HOSP IP/OBS HIGH 50: CPT | Mod: GC

## 2024-01-19 RX ORDER — INSULIN LISPRO 100/ML
6 VIAL (ML) SUBCUTANEOUS
Refills: 0 | Status: DISCONTINUED | OUTPATIENT
Start: 2024-01-19 | End: 2024-01-20

## 2024-01-19 RX ORDER — HYDRALAZINE HCL 50 MG
5 TABLET ORAL ONCE
Refills: 0 | Status: COMPLETED | OUTPATIENT
Start: 2024-01-19 | End: 2024-01-19

## 2024-01-19 RX ORDER — METFORMIN HYDROCHLORIDE 850 MG/1
500 TABLET ORAL EVERY 12 HOURS
Refills: 0 | Status: DISCONTINUED | OUTPATIENT
Start: 2024-01-19 | End: 2024-01-20

## 2024-01-19 RX ORDER — INSULIN GLARGINE 100 [IU]/ML
5 INJECTION, SOLUTION SUBCUTANEOUS AT BEDTIME
Refills: 0 | Status: DISCONTINUED | OUTPATIENT
Start: 2024-01-19 | End: 2024-01-20

## 2024-01-19 RX ORDER — HEPARIN SODIUM 5000 [USP'U]/ML
5000 INJECTION INTRAVENOUS; SUBCUTANEOUS EVERY 8 HOURS
Refills: 0 | Status: DISCONTINUED | OUTPATIENT
Start: 2024-01-19 | End: 2024-01-20

## 2024-01-19 RX ORDER — INSULIN LISPRO 100/ML
VIAL (ML) SUBCUTANEOUS
Refills: 0 | Status: DISCONTINUED | OUTPATIENT
Start: 2024-01-19 | End: 2024-01-20

## 2024-01-19 RX ORDER — SODIUM,POTASSIUM PHOSPHATES 278-250MG
1 POWDER IN PACKET (EA) ORAL ONCE
Refills: 0 | Status: COMPLETED | OUTPATIENT
Start: 2024-01-19 | End: 2024-01-19

## 2024-01-19 RX ORDER — MAGNESIUM SULFATE 500 MG/ML
1 VIAL (ML) INJECTION ONCE
Refills: 0 | Status: COMPLETED | OUTPATIENT
Start: 2024-01-19 | End: 2024-01-19

## 2024-01-19 RX ORDER — DEXAMETHASONE 0.5 MG/5ML
1 ELIXIR ORAL ONCE
Refills: 0 | Status: DISCONTINUED | OUTPATIENT
Start: 2024-01-19 | End: 2024-01-19

## 2024-01-19 RX ADMIN — Medication 4: at 12:58

## 2024-01-19 RX ADMIN — INSULIN GLARGINE 10 UNIT(S): 100 INJECTION, SOLUTION SUBCUTANEOUS at 00:26

## 2024-01-19 RX ADMIN — Medication 0.2 MILLIGRAM(S): at 08:34

## 2024-01-19 RX ADMIN — HEPARIN SODIUM 5000 UNIT(S): 5000 INJECTION INTRAVENOUS; SUBCUTANEOUS at 14:55

## 2024-01-19 RX ADMIN — Medication 81 MILLIGRAM(S): at 11:52

## 2024-01-19 RX ADMIN — Medication 0.2 MILLIGRAM(S): at 19:13

## 2024-01-19 RX ADMIN — Medication 100 MILLIGRAM(S): at 11:52

## 2024-01-19 RX ADMIN — AMLODIPINE BESYLATE 10 MILLIGRAM(S): 2.5 TABLET ORAL at 12:39

## 2024-01-19 RX ADMIN — Medication 5 MILLIGRAM(S): at 06:03

## 2024-01-19 RX ADMIN — Medication 2: at 22:45

## 2024-01-19 RX ADMIN — LOSARTAN POTASSIUM 100 MILLIGRAM(S): 100 TABLET, FILM COATED ORAL at 00:26

## 2024-01-19 RX ADMIN — METFORMIN HYDROCHLORIDE 500 MILLIGRAM(S): 850 TABLET ORAL at 20:34

## 2024-01-19 RX ADMIN — Medication 100 GRAM(S): at 11:51

## 2024-01-19 RX ADMIN — Medication 1 PACKET(S): at 11:52

## 2024-01-19 RX ADMIN — HEPARIN SODIUM 5000 UNIT(S): 5000 INJECTION INTRAVENOUS; SUBCUTANEOUS at 22:47

## 2024-01-19 RX ADMIN — Medication 2: at 18:04

## 2024-01-19 RX ADMIN — Medication 8 MILLIGRAM(S): at 22:47

## 2024-01-19 RX ADMIN — INSULIN GLARGINE 5 UNIT(S): 100 INJECTION, SOLUTION SUBCUTANEOUS at 22:46

## 2024-01-19 RX ADMIN — Medication 5 MILLIGRAM(S): at 03:01

## 2024-01-19 NOTE — DISCHARGE NOTE PROVIDER - NSDCCPCAREPLAN_GEN_ALL_CORE_FT
PRINCIPAL DISCHARGE DIAGNOSIS  Diagnosis: Hypertension  Assessment and Plan of Treatment: Hypertension is the medical term for high blood pressure. Blood pressure refers to the pressure that blood applies to the inner walls of the arteries. Arteries carry blood from the heart to other organs and parts of the body. Untreated high blood pressure increases the strain on the heart and arteries, eventually causing organ damage. High blood pressure increases the risk of heart failure, heart attack (myocardial infarction), stroke, and kidney failure. High blood pressure does not usually cause any symptoms. Treatment of hypertension usually begins with lifestyle changes. Making these lifestyle changes involves little or no risk. Recommended changes often include reducing the amount of salt in your diet, losing weight if you are overweight or obese, avoiding drinking too much alcohol, stopping smoking and exercising at least 30 minutes per day most days of the week. If you are prescribed medication for your hypertension it is important to take these as prescribed to prevent the possible complications of uncontrolled hypertension.  Please continue taking your home medications however please increased your Hydrochlorothiazide to 25mg daily.

## 2024-01-19 NOTE — CONSULT NOTE ADULT - ATTENDING COMMENTS
Pt seen on rounds this afternoon.  78-yo woman with a long history of resistant hypertension, PAD, type 2 DM and stage 3 CKD admitted for hypertensive urgency.  AT this point there are three problems to be addressed:  !) Type 2 DM--Diagnosed at age 28, currently being treated with Lantus 30 units qhs plus Farxiga.  Pt reports previous A1c levels 6.5-7.2%, currently 11.3%.  Apparently had hyperglycemic exacerbation when HCTZ was increased to 50 mg/day, though it is unclear why glucoses are still uncontrolled with decrease to 12.5/day two months ago.  At this point:  --HCTZ has been increased to 25 mg/day in the hospital, but this will also exacerbate hyperglycemia at this dose.  Nonetheless, should now be off the drug entirely due to hypercalcemia.  Would substitute torsemide 5-10 mg/day for her HTN.  --With AM glucose down to 95 this morning after 10 Lantus, would decrease to 5 units and start metformin  --Start 6 units lispro premeal   2) Hypercalcemia:  Would have considered that this was due to the thiazide, but records indicate hypercalcemia as far back as 3 years ago.  This would more strongly suggest hyperparathyroidism.  --Check PTH--though interpretation will be difficult given her CKD  --Avoid thiazides  3) Hypertension:  BP is still elevated .  Home regimen is a comb of amlodipine, HCTZ, losartan, clonidiine and metoprolol  --Check plasma fractionated metanephrines  --Change HCTZ to torsemide 5-10 mg/day  --Continue amlodipine for now but would probably be better on nifedipine--prob 30 mg BID to start, but can hold off on this  --Continue losartan  --Would start labetalol 100 mg BID, increase to 200 mg BID fairly quickly  --Should have renal artery Dopplers to rule out JUVENTINO - - -

## 2024-01-19 NOTE — DISCHARGE NOTE PROVIDER - HOSPITAL COURSE
79 y/o F w/ PMHx of resistant HTN (follows with nephrologist Dr. Pearson at [place]), HLD, PAD admitted for management of hypertensive urgency and further workup.     Problem List/Main Diagnoses:   #Hypertensive emergency.   #Resistant HTN  History of resistant HTN, followed by nephrology and with extensive outpatient work-up.   Known h/o PAD with 70% SHERIE stenosis, s/p stent. Trop neg.   Most common causes of secondary hypertension are primary aldosteronism (was r/o as Renin/Aldosterone ratio 5.84), and renal artery stenosis, CKD, and EDIS.   Less common causes include pheochromocytoma (was ruled out; urine metanephrine negative), Cushing's syndrome, and aortic coarctation.  Endorses compliance with all medications; clonidine 0.2 mg q12, HCTZ 12.5 mg qd, Toprol 50 mg qd, amlodipine 10 mg qd, Losartan 100 mg qd, terazosin 10 mg qd.  Favor a combination of CKD and EDIS and PAD history contributing, but cannot rule out the rarer forms as above.  - continue amlodipine 10mg qd  - c clonidine 0.2mg q12hr   - c doxazosin 8mg qhs  - c metoprolol 100 qd  - c hctz 25mg q24hr  - c losartan 100mg qd  - pt will space out her BP medications now moving forward  - renal US duplex ordered, f/u results  - encourage maximizing lifestyle changes for the better, healthy diet, exercise  - sleep study outpt  - 24 hour urine cortisol for Cushing's and strict Is and Os showed _________  - low dose dexamethasone suppression test, 1mg dexamethasone at 11pm, serum level at 8am following day showed _______    #Type 2 diabetes mellitus.   Known history of DM2. On home farxiga 10 mg qd, lantus 10 u qhs / versus 30 units at times.  Controlled as of 1/19 AM from a POCT perspective, however A1c of 11.3.   - endocrine consult given elevated a1c this admission, rec ______________      New medications/therapies: hctz 25mg now  New lines/hardware: none  Labs to be followed outpatient: none  Exam to be followed outpatient: pcp follow up    Discharge plan: discharge to home     79 y/o F w/ PMHx of resistant HTN (follows with nephrologist Dr. Pearson at [place]), HLD, PAD admitted for management of hypertensive urgency and further workup.     Problem List/Main Diagnoses:   #Hypertensive emergency.   #Resistant HTN  History of resistant HTN, followed by nephrology and with extensive outpatient work-up.   Known h/o PAD with 70% SHERIE stenosis, s/p stent. Trop neg.   Most common causes of secondary hypertension are primary aldosteronism (was r/o as Renin/Aldosterone ratio 5.84), and renal artery stenosis, CKD, and EDIS.   Less common causes include pheochromocytoma (was ruled out; urine metanephrine negative), Cushing's syndrome, and aortic coarctation.  Endorses compliance with all medications; clonidine 0.2 mg q12, HCTZ 12.5 mg qd, Toprol 50 mg qd, amlodipine 10 mg qd, Losartan 100 mg qd, terazosin 10 mg qd.  Favor a combination of CKD and EDIS and PAD history contributing, but cannot rule out the rarer forms as above.  - continue amlodipine 10mg qd  - c clonidine 0.2mg q12hr   - c doxazosin 8mg qhs  - c metoprolol 100 qd  - discontinue HCTZ 25mg q24hr due to hypercalcemia  - c losartan 100mg qd  - pt will space out her BP medications now moving forward  - renal US duplex ordered, f/u results  - encourage maximizing lifestyle changes for the better, healthy diet, exercise  - sleep study outpt  - low dose dexamethasone suppression test, 1mg dexamethasone at 11pm, serum level at 8am following day showed 9.9    #Type 2 diabetes mellitus.   Known history of DM2. On home farxiga 10 mg qd, lantus 10 u qhs / versus 30 units at times.  Controlled as of 1/19 AM from a POCT perspective, however A1c of 11.3.   - endocrine consult given elevated a1c this admission  - start metformin 500mg twice daily with breakfast and dinner.  - lantus 5 units at bedtime, lispro 6 units before each meal, moderate sliding scale  - fingerstick glucose goal is 100-180 mg/dL.    - For discharge: TBD. Will need some sort of secretagogue and will see if GLP-1 is covered bu sending Rx to [facility].   - Patient can follow up at discharge with [facility] by calling [phone] to make an appointment.      New medications/therapies:   New lines/hardware: none  Labs to be followed outpatient: none  Exam to be followed outpatient: pcp follow-up, 24 hour urine cortisol for Cushing's    Discharge plan: discharge to home     77 y/o F w/ PMHx of resistant HTN (follows with nephrologist Dr. Pearson at [place]), HLD, PAD admitted for management of hypertensive urgency and further workup.     Problem List/Main Diagnoses:   #Hypertensive emergency.   #Resistant HTN  History of resistant HTN, followed by nephrology and with extensive outpatient work-up.   Known h/o PAD with 70% SHERIE stenosis, s/p stent. Trop neg.   Most common causes of secondary hypertension are primary aldosteronism (was r/o as Renin/Aldosterone ratio 5.84), and renal artery stenosis, CKD, and EDIS.   Less common causes include pheochromocytoma (was ruled out; urine metanephrine negative), Cushing's syndrome, and aortic coarctation.  Endorses compliance with all medications; clonidine 0.2 mg q12, HCTZ 12.5 mg qd, Toprol 50 mg qd, amlodipine 10 mg qd, Losartan 100 mg qd, terazosin 10 mg qd.  Favor a combination of CKD and EDIS and PAD history contributing, but cannot rule out the rarer forms as above.  - continue amlodipine 10mg qd  - c clonidine 0.2mg q12hr   - c doxazosin 8mg qhs  - c metoprolol 100 qd  - discontinue HCTZ 25mg q24hr due to hypercalcemia  - c losartan 100mg qd  - pt will space out her BP medications now moving forward  - renal US duplex ordered, f/u results  - encourage maximizing lifestyle changes for the better, healthy diet, exercise  - sleep study outpt  - low dose dexamethasone suppression test, 1mg dexamethasone at 11pm, serum level at 8am following day showed 9.9    #Type 2 diabetes mellitus.   Known history of DM2. On home farxiga 10 mg qd, lantus 10 u qhs / versus 30 units at times.  Controlled as of 1/19 AM from a POCT perspective, however A1c of 11.3.   - endocrine consult given elevated a1c this admission  - start metformin 500mg twice daily with breakfast & dinner  - lantus 5 units at bedtime, lispro 6 units before each meal, moderate sliding scale  - fingerstick glucose goal is 100-180 mg/dL.    - For discharge: metformin 500mg once daily with breakfast, lantus 5 units at bedtime, lispro 6 units before each meal  - Patient can follow up at discharge with [facility] by calling [phone] to make an appointment.      New medications/therapies: metformin 500mg once daily with breakfast, lantus 5 units at bedtime, lispro 6 units before each meal  New lines/hardware: none  Labs to be followed outpatient: none  Exam to be followed outpatient: pcp follow-up, labs for Cushing's workup    Discharge plan: discharge to home

## 2024-01-19 NOTE — CONSULT NOTE ADULT - PROBLEM SELECTOR RECOMMENDATION 9
Type 2 diabetes mellitus with hyperglycemia  - Please continue lantus *** units at bedtime.   - Continue lispro *** units before each meal.  - Continue lispro moderate / low dose sliding scale before meals and at bedtime.  - Patient's fingerstick glucose goal is 100-180 mg/dL.    - For discharge, patient can ***.    - Patient can follow up at discharge with [facility] by calling [phone] to make an appointment.      Case discussed with Dr. Davies. Primary team updated. Type 2 diabetes mellitus with hyperglycemia  - Start metformin 500mg twice daily with breakfast and dinner.  - Please start lantus 5 units at bedtime.   - Start lispro 6 units before each meal.  - Continue lispro moderate dose sliding scale before meals and at bedtime.  - Patient's fingerstick glucose goal is 100-180 mg/dL.    - For discharge: TBD. Will need some sort of secretagogue and will see if GLP-1 is covered bu sending Rx to [facility].   - Patient can follow up at discharge with [facility] by calling [phone] to make an appointment.      Case discussed with Dr. Davies. Primary team updated.

## 2024-01-19 NOTE — DISCHARGE NOTE PROVIDER - NSDCMRMEDTOKEN_GEN_ALL_CORE_FT
allopurinol 100 mg oral tablet: orally once a day  amLODIPine 10 mg oral tablet: 1 tab(s) orally once a day  aspirin 81 mg oral tablet, chewable: 1 tab(s) orally once a day  cloNIDine 0.2 mg oral tablet: 1 tab(s) orally every 12 hours  Farxiga 10 mg oral tablet: 1 tab(s) orally once a day  fenofibrate 145 mg oral tablet: 1 tab(s) orally once a day  hydroCHLOROthiazide 12.5 mg oral tablet: 1 tab(s) orally  Lantus 100 units/mL subcutaneous solution: 10 unit(s) subcutaneous once a day (at bedtime)  losartan 100 mg oral tablet: 1 tab(s) orally once a day  lovastatin 40 mg oral tablet: 1 tab(s) orally once a day  terazosin 10 mg oral capsule: 1 cap(s) orally once a day (at bedtime)  Toprol-XL 50 mg oral tablet, extended release: 1 tab(s) orally   allopurinol 100 mg oral tablet: orally once a day  amLODIPine 10 mg oral tablet: 1 tab(s) orally once a day  aspirin 81 mg oral tablet, chewable: 1 tab(s) orally once a day  cloNIDine 0.2 mg oral tablet: 1 tab(s) orally every 12 hours  fenofibrate 145 mg oral tablet: 1 tab(s) orally once a day  HumaLOG 100 units/mL injectable solution: 6 injectable 3 times a day before each meal  Insulin Pen Needles, 4mm: 1 application subcutaneously 4 times a day. ** Use with insulin pen **  Lantus 100 units/mL subcutaneous solution: 5 unit(s) subcutaneous once a day (at bedtime)  losartan 100 mg oral tablet: 1 tab(s) orally once a day  lovastatin 40 mg oral tablet: 1 tab(s) orally once a day  metFORMIN 500 mg oral tablet: 1 tab(s) orally once a day with breakfast  terazosin 10 mg oral capsule: 1 cap(s) orally once a day (at bedtime)  Toprol-XL 50 mg oral tablet, extended release: 2 tab(s) orally once a day

## 2024-01-19 NOTE — PROGRESS NOTE ADULT - PROBLEM SELECTOR PLAN 1
History of resistant HTN, followed by nephrology and with extensive outpatient work-up.   Pheo r/o; urine metanephrine negative. Hyperaldosteronism r/o as Renin/Aldosterone ratio 5.84.   Pending outpatient renal artery u/s to r/o JUVENTINO.    Known h/o PAD with 70% SHERIE stenosis, s/p stent. Trop neg.   Most common cause of secondary hypertension are primary aldosteronism which was r/o outpt, and renal artery stenosis (pending duplex doppler US), CKD, and EDIS.   Less common causes include pheochromocytoma (was r/o outpt), Cushing's syndrome, and aortic coarctation.  Endorses compliance with all medications; clonidine 0.2 mg q12, HCTZ 12.5 mg qd, Toprol 50 mg qd, amlodipine 10 mg qd, Losartan 100 mg qd, terazosin 10 mg qd.  - continue amlodipine 10mg qd  - c clonidine 0.2mg q12hr  - c doxazosin 8mg qhs  - c metoprolol 100 qd  - increase hctz 12.5mg q12hr to 25mg q24hr  - c losartan 100mg qd  - monitor for s/sx's of hypertensive urgency  - consider renal US while inpatient  - CTM for CP/CT  - encourage maximizing lifestyle changes for the better, healthy diet, exercise  - sleep study outpt  - 24 hour urine cortisol for Cushing's, midnight salivary cortisol test, low dose dexamethasone suppression test #Resistant HTN  History of resistant HTN, followed by nephrology and with extensive outpatient work-up.   Known h/o PAD with 70% SHERIE stenosis, s/p stent. Trop neg.   Most common causes of secondary hypertension are primary aldosteronism (was r/o as Renin/Aldosterone ratio 5.84), and renal artery stenosis, CKD, and EDIS.   Less common causes include pheochromocytoma (was ruled out; urine metanephrine negative), Cushing's syndrome, and aortic coarctation.  Endorses compliance with all medications; clonidine 0.2 mg q12, HCTZ 12.5 mg qd, Toprol 50 mg qd, amlodipine 10 mg qd, Losartan 100 mg qd, terazosin 10 mg qd.  Favor a combination of CKD and EDIS and PAD history contributing, but cannot rule out the rarer forms as above.  - continue amlodipine 10mg qd  - c clonidine 0.2mg q12hr   - c doxazosin 8mg qhs  - c metoprolol 100 qd  - increase hctz 12.5mg q12hr to 25mg q24hr  - c losartan 100mg qd  - retime all BP meds based on half life/pharmacokinetics  - monitor for s/sx's of hypertensive urgency, CTM for CP/CT  - renal US duplex ordered, f/u results  - encourage maximizing lifestyle changes for the better, healthy diet, exercise  - sleep study outpt  - 24 hour urine cortisol for Cushing's and strict Is and Os, midnight salivary cortisol test,   - low dose dexamethasone suppression test, 1mg dexamethasone at 11pm, serum level at 8am following day

## 2024-01-19 NOTE — DISCHARGE NOTE PROVIDER - CARE PROVIDER_API CALL
Uche Montgomery)  Internal Medicine  [address]  [place] [address]  Phone: [phone]  Fax: [phone]  Follow Up Time:

## 2024-01-19 NOTE — CONSULT NOTE ADULT - ASSESSMENT
79 y/o F w/ PMHx of resistant HTN (follows with nephrologist Dr. Pearson at [place]), HLD, PAD admitted for management of hypertensive urgency, also with uncontrolled type 2 diabetes with hyperglycemia.    A1C: 11.3 %  BUN: 24  Creatinine: 1.40  GFR: 38  Weight: 78  BMI: 28.6 77 y/o F w/ PMHx of resistant HTN (follows with nephrologist Dr. Pearson at [place]), HLD, PAD admitted for management of hypertensive urgency, also with uncontrolled type 2 diabetes with hyperglycemia.    A1C: 11.3 %  BUN: 24  Creatinine: 1.40  GFR: 38  Weight: 78  BMI: 28.6

## 2024-01-19 NOTE — DISCHARGE NOTE PROVIDER - ATTENDING DISCHARGE PHYSICAL EXAMINATION:
PE  Gen: pleasant female in NAD  HEENT: EOMI, NCAT  Neck: no jvd no bruits  Lungs: CTA b/l nonlabored  CV: RRR  GI: +BS nontender  LE: no edema  Psych: calm. Cooperative. Pleasant.

## 2024-01-19 NOTE — CONSULT NOTE ADULT - PROBLEM SELECTOR RECOMMENDATION 3
Nov 2023 - Urine metanephrines normal, Vik 10, renin 58.4, renin activity 8.6  Pending outpatient renal artery u/s to r/o JUVENTINO - not yet done as OP. US abd/pelvis dopplers ordered here.  No Cushing's stigmata on exam. Do not recommend 24 hour urine cortisol and 1mg dexa suppression test performed in hospital setting.    Please order plasma fractionated metanephrines.

## 2024-01-19 NOTE — DISCHARGE NOTE PROVIDER - NSDCFUADDAPPT_GEN_ALL_CORE_FT
Please follow up with [facility] on 2/13/24    Please also follow up with [facility] by calling [phone] to make an appointment

## 2024-01-19 NOTE — PROGRESS NOTE ADULT - SUBJECTIVE AND OBJECTIVE BOX
HPI  79 y/o F w/ PMHx of resistant HTN, HLD, IDDM type 2, CKD stage 3, PAD s/p R endarterectomy with stenting who presented from PCP/[facility] due to elevated /150s, however asymptomatic.   She consistently denies any HA, vision changes, CP, SOB, n/v, abdominal pain, numbness or tingling. Ambulatory BP monitoring is labile usually reads SBP 130s in am and 190s in pm, uses wrist cuff for measuring. Recently, clonidine 0.2 mg patch qwk changed to 0.2 mg q12. Endorses compliance with all medications; clonidine 0.2 mg q12, HCTZ 12.5 mg qd, Toprol 50 mg qd, amlodipine 10 mg qd, Losartan 100 mg qd, terazosin 10 mg qd. Of note, pt with extensive outpatient work-up, pheo and hyperaldosteronism r/o.     OVERNIGHT EVENTS:   None    SUBJECTIVE:    - Patient seen and examined at bedside, comfortable, NAD. Denied fever, chest pain, dyspnea, abdominal pain.     Vital Signs Last 12 Hrs  T(F): 98.7 (01-19-24 @ 05:27), Max: 98.7 (01-19-24 @ 05:27)  HR: 92 (01-19-24 @ 07:00) (71 - 92)  BP: 193/77 (01-19-24 @ 07:00) (173/71 - 196/110)  BP(mean): --  RR: 18 (01-19-24 @ 05:27) (17 - 18)  SpO2: 99% (01-19-24 @ 05:27) (96% - 99%)    PHYSICAL EXAM:  Constitutional: resting comfortably in bed; NAD  Head: NC/AT  Eyes: PERRL, EOMI, anicteric sclera  ENT: no nasal discharge; uvula midline, no oropharyngeal erythema or exudates; MMM  Neck: supple; no JVD or thyromegaly  Respiratory: CTA B/L; no W/R/R, no retractions  Cardiac: +S1/S2; RRR; no M/R/G; PMI non-displaced  Gastrointestinal: abdomen soft, NT/ND; no rebound or guarding  Extremities: WWP, no clubbing or cyanosis; no peripheral edema  Musculoskeletal: NROM x4; no joint swelling, fat pads over dorsal aspect of wrist b/l  Vascular: 2+ radial, DP pulses B/L  Neurologic: AAOx3; no focal deficits  Psychiatric: affect and characteristics of appearance, verbalizations, behaviors are appropriate    LABS:                        13.5   9.16  )-----------( 195      ( 18 Jan 2024 12:58 )             38.7     01-18    138  |  103  |  28<H>  ----------------------------<  136<H>  4.6   |  23  |  1.33<H>    Ca    10.9<H>      18 Jan 2024 12:58        Urinalysis Basic - ( 18 Jan 2024 12:58 )    Color: x / Appearance: x / SG: x / pH: x  Gluc: 136 mg/dL / Ketone: x  / Bili: x / Urobili: x   Blood: x / Protein: x / Nitrite: x   Leuk Esterase: x / RBC: x / WBC x   Sq Epi: x / Non Sq Epi: x / Bacteria: x    RADIOLOGY & ADDITIONAL TESTS:  1/18 CXR impression: Bibasilar focal atelectasis, slight elevation of the left  hemidiaphragm. Heart size within normal limits, thoracic aortic calcification. Unremarkable mediastinum. Thoracic spine degenerative changes.    MEDICATIONS  (STANDING):  allopurinol 100 milliGRAM(s) Oral daily  amLODIPine   Tablet 10 milliGRAM(s) Oral every 24 hours  aspirin enteric coated 81 milliGRAM(s) Oral daily  cloNIDine 0.2 milliGRAM(s) Oral every 12 hours  dextrose 5%. 1000 milliLiter(s) (50 mL/Hr) IV Continuous <Continuous>  dextrose 5%. 1000 milliLiter(s) (100 mL/Hr) IV Continuous <Continuous>  dextrose 50% Injectable 25 Gram(s) IV Push once  dextrose 50% Injectable 25 Gram(s) IV Push once  dextrose 50% Injectable 12.5 Gram(s) IV Push once  doxazosin 8 milliGRAM(s) Oral at bedtime  enoxaparin Injectable 40 milliGRAM(s) SubCutaneous every 24 hours  glucagon  Injectable 1 milliGRAM(s) IntraMuscular once  hydrochlorothiazide 12.5 milliGRAM(s) Oral every 24 hours  influenza  Vaccine (HIGH DOSE) 0.7 milliLiter(s) IntraMuscular once  insulin glargine Injectable (LANTUS) 10 Unit(s) SubCutaneous at bedtime  insulin lispro (ADMELOG) corrective regimen sliding scale   SubCutaneous at bedtime  losartan 100 milliGRAM(s) Oral daily  metoprolol succinate  milliGRAM(s) Oral daily    MEDICATIONS  (PRN):  acetaminophen     Tablet .. 650 milliGRAM(s) Oral every 6 hours PRN Temp greater or equal to 38C (100.4F), Mild Pain (1 - 3)  aluminum hydroxide/magnesium hydroxide/simethicone Suspension 30 milliLiter(s) Oral every 4 hours PRN Dyspepsia  dextrose Oral Gel 15 Gram(s) Oral once PRN Blood Glucose LESS THAN 70 milliGRAM(s)/deciliter  melatonin 3 milliGRAM(s) Oral at bedtime PRN Insomnia  ondansetron Injectable 4 milliGRAM(s) IV Push every 8 hours PRN Nausea and/or Vomiting   HPI  77 y/o F w/ PMHx of resistant HTN, HLD, IDDM type 2, CKD stage 3, PAD s/p R endarterectomy with stenting who presented from [facility]CP/[facility] due to elevated /150s, however asymptomatic.   She consistently denies any HA, vision changes, CP, SOB, n/v, abdominal pain, numbness or tingling. Ambulatory BP monitoring is labile usually reads SBP 130s in am and 190s in pm, uses wrist cuff for measuring. Recently, clonidine 0.2 mg patch qwk changed to 0.2 mg q12. Endorses compliance with all medications; clonidine 0.2 mg q12, HCTZ 12.5 mg qd, Toprol 50 mg qd, amlodipine 10 mg qd, Losartan 100 mg qd, terazosin 10 mg qd. Of note, pt with extensive outpatient work-up, pheo and hyperaldosteronism r/o.     OVERNIGHT EVENTS:   None    SUBJECTIVE:    - Patient seen and examined at bedside, comfortable, NAD. Denied fever, chest pain, dyspnea, abdominal pain.     Vital Signs Last 12 Hrs  T(F): 98.7 (01-19-24 @ 05:27), Max: 98.7 (01-19-24 @ 05:27)  HR: 92 (01-19-24 @ 07:00) (71 - 92)  BP: 193/77 (01-19-24 @ 07:00) (173/71 - 196/110)  BP(mean): --  RR: 18 (01-19-24 @ 05:27) (17 - 18)  SpO2: 99% (01-19-24 @ 05:27) (96% - 99%)    PHYSICAL EXAM:  Constitutional: resting comfortably in bed; NAD  Head: NC/AT  Eyes: PERRL, EOMI, anicteric sclera  ENT: no nasal discharge; uvula midline, MMM  Neck: supple; no JVD or thyromegaly  Respiratory: CTA B/L; no W/R/R, no retractions  Cardiac: +S1/S2; RRR; no M/R/G; PMI non-displaced  Gastrointestinal: abdomen soft, NT/ND; no rebound or guarding  Extremities: WWP, no clubbing or cyanosis; no peripheral edema  Musculoskeletal: NROM x4; no joint swelling  Vascular: 2+ radial, DP pulses B/L  Neurologic: AAOx3; no focal deficits  Psychiatric: affect and characteristics of appearance, verbalizations, behaviors are appropriate    LABS:                        13.5   9.16  )-----------( 195      ( 18 Jan 2024 12:58 )             38.7     01-18    138  |  103  |  28<H>  ----------------------------<  136<H>  4.6   |  23  |  1.33<H>    Ca    10.9<H>      18 Jan 2024 12:58        Urinalysis Basic - ( 18 Jan 2024 12:58 )    Color: x / Appearance: x / SG: x / pH: x  Gluc: 136 mg/dL / Ketone: x  / Bili: x / Urobili: x   Blood: x / Protein: x / Nitrite: x   Leuk Esterase: x / RBC: x / WBC x   Sq Epi: x / Non Sq Epi: x / Bacteria: x    RADIOLOGY & ADDITIONAL TESTS:  1/18 CXR impression: Bibasilar focal atelectasis, slight elevation of the left  hemidiaphragm. Heart size within normal limits, thoracic aortic calcification. Unremarkable mediastinum. Thoracic spine degenerative changes.    MEDICATIONS  (STANDING):  allopurinol 100 milliGRAM(s) Oral daily  amLODIPine   Tablet 10 milliGRAM(s) Oral every 24 hours  aspirin enteric coated 81 milliGRAM(s) Oral daily  cloNIDine 0.2 milliGRAM(s) Oral every 12 hours  dextrose 5%. 1000 milliLiter(s) (50 mL/Hr) IV Continuous <Continuous>  dextrose 5%. 1000 milliLiter(s) (100 mL/Hr) IV Continuous <Continuous>  dextrose 50% Injectable 25 Gram(s) IV Push once  dextrose 50% Injectable 25 Gram(s) IV Push once  dextrose 50% Injectable 12.5 Gram(s) IV Push once  doxazosin 8 milliGRAM(s) Oral at bedtime  enoxaparin Injectable 40 milliGRAM(s) SubCutaneous every 24 hours  glucagon  Injectable 1 milliGRAM(s) IntraMuscular once  hydrochlorothiazide 12.5 milliGRAM(s) Oral every 24 hours  influenza  Vaccine (HIGH DOSE) 0.7 milliLiter(s) IntraMuscular once  insulin glargine Injectable (LANTUS) 10 Unit(s) SubCutaneous at bedtime  insulin lispro (ADMELOG) corrective regimen sliding scale   SubCutaneous at bedtime  losartan 100 milliGRAM(s) Oral daily  metoprolol succinate  milliGRAM(s) Oral daily    MEDICATIONS  (PRN):  acetaminophen     Tablet .. 650 milliGRAM(s) Oral every 6 hours PRN Temp greater or equal to 38C (100.4F), Mild Pain (1 - 3)  aluminum hydroxide/magnesium hydroxide/simethicone Suspension 30 milliLiter(s) Oral every 4 hours PRN Dyspepsia  dextrose Oral Gel 15 Gram(s) Oral once PRN Blood Glucose LESS THAN 70 milliGRAM(s)/deciliter  melatonin 3 milliGRAM(s) Oral at bedtime PRN Insomnia  ondansetron Injectable 4 milliGRAM(s) IV Push every 8 hours PRN Nausea and/or Vomiting

## 2024-01-19 NOTE — PROGRESS NOTE ADULT - PROBLEM SELECTOR PLAN 5
Fluids: none  Electrolytes: replete as necessary  Nutrition: carb consistent diet  DVT: lovenox 40 subq  Code status: Full   Disposition: RMF > home Fluids: none  Electrolytes: replete as necessary  Nutrition: carb consistent diet  DVT: lovenox 40 subq  Code status: Full   Disposition: F

## 2024-01-19 NOTE — PROGRESS NOTE ADULT - PROBLEM SELECTOR PLAN 4
Pt w/ h/o CKD stage 3, followed by nephrology Dr. Pearson. Baseline Cr 1.29, on admission 1.33.  Thus patient likely at baseline kidney function wise.   - Continue to trend Cr  - Avoid nephrotoxic agents  - Adjust medication dosages for level of renal function Known history of DM2. On home farxiga 10 mg qd, lantus 10 u qhs / versus 30 units at times.  Controlled as of 1/19 AM from a POCT perspective, however A1c of 11.3.   - moderate ISF-25 lispro sliding scale AC+qHS  - c lantus 10 u qhs at this time  - monitor FS  - consistent carb diet, low Na  - endocrine consult given elevated a1c this admission

## 2024-01-19 NOTE — PROGRESS NOTE ADULT - PROBLEM SELECTOR PLAN 3
Known history of DM2. On home farxiga 10 mg qd, lantus 10 u qhs   - moderate ISF-25 lispro sliding scale AC+qHS  - c lantus 10 u qhs  - monitor FS  - consistent carb diet, low Na Pt w/ h/o of gout, home meds allopurinol 100 mg qd   - continue with home meds

## 2024-01-19 NOTE — PROGRESS NOTE ADULT - PROBLEM SELECTOR PLAN 2
Pt w/ h/o of gout, home meds allopurinol 100 mg qd   - continue with home meds Pt w/ h/o CKD stage 3, followed by nephrology Dr. Pearson. Baseline Cr 1.2, elevated on this admission, as well as elevated BUN. CKD likely playing a role in resistant HTN picture, and CHIQUITA on CKD further exacerbating this issue.  - Continue to trend Cr  - Avoid nephrotoxic agents  - Adjust medication dosages for level of renal function  - addressing HTN picture as per above

## 2024-01-19 NOTE — PROGRESS NOTE ADULT - ASSESSMENT
79 y/o F w/ PMHx of resistant HTN (follows with nephrologist Dr. Pearson at [place]), HLD, PAD admitted for management of hypertensive urgency and further workup.

## 2024-01-19 NOTE — DISCHARGE NOTE PROVIDER - NSDCFUSCHEDAPPT_GEN_ALL_CORE_FT
Magnolia Arevalo  [facility]  [facility] [address]  Scheduled Appointment: 02/13/2024

## 2024-01-19 NOTE — CONSULT NOTE ADULT - SUBJECTIVE AND OBJECTIVE BOX
HISTORY OF PRESENT ILLNESS  77 y/o F w/ PMHx of resistant HTN (follows with nephrologist Dr. Pearson at [place]), HLD, type 2 DM, CKD stage 3, PAD s/p R endarterectomy with stenting who presents from PCP/[facility] due to elevated /150s, however asymptomatic. She denies any HA, vision changes, CP, SOB, n/v, abdominal pain, numbness or tingling. Ambulatory BP monitoring is labile usually reads SBP 130s in am and 190s in pm, uses wrist cuff for measuring. Recently, clonidine 0.2 mg patch qwk changed to 0.2 mg q12. Endorses compliance with all medications; clonidine 0.2 mg q12, HCTZ 12.5 mg qd, Toprol 50 mg qd, amlodipine 10 mg qd, Losartan 100 mg qd, terazosin 10 mg qd. Of note, pt with extensive outpatient work-up, pheo and hyperaldosteronism r/o.     In the ED:  Initial vital signs: T: 98.7 F, HR: 111 > 73, BP: 248/112 > 170/68 labetalol x1, 201/88 > 160/68 after labetalol x2, R: 18, SpO2: 96% on RA  Labs: significant for BUN/Cr 28/1.33 (1.97 7/2023), GFR 40, neg trop  EKG: Sinus, RBBB, no ischemia, no prior EKG   Medications: labetalol 10 mg x2, 20 mg x1 IVP, 100 mg PO x1, clonidine 0.1 mg x1  Consults: none     · 	furosemide 40 mg oral tablet: Last Dose Taken:  , 1 tab(s) orally once a day  · 	Toprol-XL 50 mg oral tablet, extended release: Last Dose Taken:  , 1 tab(s) orally  · 	aspirin 81 mg oral tablet, chewable: Last Dose Taken:  , 1 tab(s) orally once a day  · 	hydroCHLOROthiazide 12.5 mg oral tablet: Last Dose Taken:  , 1 tab(s) orally  · 	lovastatin 40 mg oral tablet: Last Dose Taken:  , 1 tab(s) orally once a day  · 	terazosin 10 mg oral capsule: Last Dose Taken:  , 1 cap(s) orally once a day (at bedtime)  · 	amLODIPine 10 mg oral tablet: Last Dose Taken:  , 1 tab(s) orally once a day  · 	losartan 100 mg oral tablet: Last Dose Taken:  , 1 tab(s) orally once a day  · 	fenofibrate 145 mg oral tablet: Last Dose Taken:  , 1 tab(s) orally once a day  · 	glipizide-metformin 2.5 mg-500 mg oral tablet: Last Dose Taken:  , orally 3 times a day  · 	Levemir 100 units/mL subcutaneous solution: Last Dose Taken:  , 36 units in the morning     History of resistant HTN, followed by nephrology and with extensive outpatient work-up.   Pheo r/o; urine metanephrine negative. Hyperaldosteronism r/o as Renin/Aldosterone ratio 5.84.   Pending outpatient renal artery u/s to r/o JUVENTINO.    Known h/o PAD with 70% SHERIE stenosis, s/p stent. Trop neg.   Most common cause of secondary hypertension are primary aldosteronism which was r/o outpt, and renal artery stenosis (pending duplex doppler US), CKD, and EDIS.   Less common causes include pheochromocytoma (was r/o outpt), Cushing's syndrome, and aortic coarctation.  - 24 hour urine cortisol for Cushing's, midnight salivary cortisol test, low dose dexamethasone suppression test.    CAPILLARY BLOOD GLUCOSE & INSULIN RECEIVED  136 mg/dL (01-18 @ 12:58)  226 mg/dL (01-18 @ 17:49)  242 mg/dL (01-18 @ 22:22)  180 mg/dL (01-19 @ 00:16)  95 mg/dL (01-19 @ 05:30)  95 mg/dL (01-19 @ 06:26)      DIABETES HISTORY  - Age at diagnosis:   - Symptoms at time of diagnosis:   - Current Therapy:  - History of other regimens:   - History of hypoglycemia:   - History of DKA/HHS:   - Complications:   - Home FSG:        > Fasting: *** mg/dL.        > Before meals: *** mg/dL.        > Bedtime: *** mg/dL.  - Diet:          > Breakfast:         > Lunch:        > Dinner:        > Snacks:  - Physical activity:    - Outpatient follow-up:     PAST MEDICAL & SURGICAL HISTORY  As per history of present illness.     FAMILY HISTORY  - Diabetes:  - Thyroid:  - Autoimmune:  - Other:    SOCIAL HISTORY  - Work:  - Alcohol:  - Smoking:  - Recreational Drugs:    ALLERGIES  No Known Allergies    CURRENT MEDICATIONS  acetaminophen     Tablet .. 650 milliGRAM(s) Oral every 6 hours PRN  allopurinol 100 milliGRAM(s) Oral daily  aluminum hydroxide/magnesium hydroxide/simethicone Suspension 30 milliLiter(s) Oral every 4 hours PRN  amLODIPine   Tablet 10 milliGRAM(s) Oral every 24 hours  aspirin enteric coated 81 milliGRAM(s) Oral daily  cloNIDine 0.2 milliGRAM(s) Oral every 12 hours  dextrose 5%. 1000 milliLiter(s) IV Continuous <Continuous>  dextrose 5%. 1000 milliLiter(s) IV Continuous <Continuous>  dextrose 50% Injectable 25 Gram(s) IV Push once  dextrose 50% Injectable 25 Gram(s) IV Push once  dextrose 50% Injectable 12.5 Gram(s) IV Push once  dextrose Oral Gel 15 Gram(s) Oral once PRN  doxazosin 8 milliGRAM(s) Oral at bedtime  enoxaparin Injectable 40 milliGRAM(s) SubCutaneous every 24 hours  glucagon  Injectable 1 milliGRAM(s) IntraMuscular once  hydrochlorothiazide 12.5 milliGRAM(s) Oral every 24 hours  influenza  Vaccine (HIGH DOSE) 0.7 milliLiter(s) IntraMuscular once  insulin glargine Injectable (LANTUS) 10 Unit(s) SubCutaneous at bedtime  insulin lispro (ADMELOG) corrective regimen sliding scale   SubCutaneous at bedtime  losartan 100 milliGRAM(s) Oral daily  melatonin 3 milliGRAM(s) Oral at bedtime PRN  metoprolol succinate  milliGRAM(s) Oral daily  ondansetron Injectable 4 milliGRAM(s) IV Push every 8 hours PRN    REVIEW OF SYSTEMS  Constitutional:  Negative fever, chills or loss of appetite.  Eyes:  Negative blurry vision or double vision.  Cardiovascular:  Negative for chest pain or palpitations.  Respiratory:  Negative for cough, wheezing, or shortness of breath.   Gastrointestinal:  Negative for nausea, vomiting, diarrhea, constipation, or abdominal pain.  Genitourinary:  Negative frequency, urgency or dysuria.  Neurologic:  No headache, confusion, dizziness, lightheadedness.    PHYSICAL EXAM  Vital Signs Last 24 Hrs  T(C): 37.1 (19 Jan 2024 05:27), Max: 37.1 (18 Jan 2024 11:17)  T(F): 98.7 (19 Jan 2024 05:27), Max: 98.7 (18 Jan 2024 11:17)  HR: 92 (19 Jan 2024 07:00) (69 - 111)  BP: 193/77 (19 Jan 2024 07:00) (140/75 - 248/112)  BP(mean): --  RR: 18 (19 Jan 2024 05:27) (16 - 18)  SpO2: 99% (19 Jan 2024 05:27) (94% - 99%)    Parameters below as of 19 Jan 2024 05:27  Patient On (Oxygen Delivery Method): room air    Constitutional: Awake, alert, in no acute distress.   HEENT: Normocephalic, atraumatic, VJ, no proptosis or lid retraction.   Neck: supple, no acanthosis, no thyromegaly or palpable thyroid nodules.  Respiratory: Lungs clear to ausculation bilaterally.   Cardiovascular: regular rhythm, normal S1 and S2, no audible murmurs.   GI: soft, non-tender, non-distended, bowel sounds present, no masses appreciated.  Extremities: No lower extremity edema, peripheral pulses present.   Skin: no rashes.   Psychiatric: AAO x 3. Normal affect/mood.     LABS  CBC - WBC/HGB/HTC/PLT: 8.64/13.0/38.0/188 (01-19-24)  BMP: Na/K/Cl/Bicarb/BUN/Cr/Gluc: 142/4.2/108/24/24/1.40/95 (01-19-24)  Anion Gap: 10 (01-19-24)  eGFR: 38 (01-19-24)  Calcium: 11.0 (01-19-24)  Phosphorus: 2.5 (01-19-24)  Magnesium: 1.7 (01-19-24)  LFT - Alb/Tprot/Tbili/Dbili/AlkPhos/ALT/AST: 3.9/--/0.8/--/145/13/22 (01-19-24)        HISTORY OF PRESENT ILLNESS  79 y/o F w/ PMHx of resistant HTN (follows with nephrologist Dr. Pearson at [place]), HLD, type 2 DM, CKD stage 3, PAD s/p R endarterectomy with stenting (Oct 2022, Dr Viera), and gout colchicine 0.6mg) who presents from PCP/[facility] due to elevated /150s, however asymptomatic. She denies any HA, vision changes, CP, SOB, n/v, abdominal pain, numbness or tingling. Ambulatory BP monitoring is labile usually reads SBP 130s in am and 190s in pm, uses wrist cuff for measuring. Recently, clonidine 0.2 mg patch qwk changed to 0.2 mg q12. Endorses compliance with all medications; clonidine 0.2 mg q12, HCTZ 12.5 mg qd, Toprol 50 mg qd, amlodipine 10 mg qd, Losartan 100 mg qd, terazosin 10 mg qd. Of note, pt with outpatient work-up, pheo and hyperaldosteronism r/o.     In the ED:  Initial vital signs: T: 98.7 F, HR: 111 > 73, BP: 248/112 > 170/68 labetalol x1, 201/88 > 160/68 after labetalol x2, R: 18, SpO2: 96% on RA  Labs: significant for BUN/Cr 28/1.33 (1.97 7/2023), GFR 40, neg trop  EKG: Sinus, RBBB, no ischemia, no prior EKG   Medications: labetalol 10 mg x2, 20 mg x1 IVP, 100 mg PO x1, clonidine 0.1 mg x1    Meds:  · 	furosemide 40 mg oral tablet: Last Dose Taken:  , 1 tab(s) orally once a day  · 	Toprol-XL 50 mg oral tablet, extended release: Last Dose Taken:  , 1 tab(s) orally  · 	aspirin 81 mg oral tablet, chewable: Last Dose Taken:  , 1 tab(s) orally once a day  · 	hydroCHLOROthiazide 12.5 mg oral tablet: Last Dose Taken:  , 1 tab(s) orally  · 	lovastatin 40 mg oral tablet: Last Dose Taken:  , 1 tab(s) orally once a day  · 	terazosin 10 mg oral capsule: Last Dose Taken:  , 1 cap(s) orally once a day (at bedtime)  · 	amLODIPine 10 mg oral tablet: Last Dose Taken:  , 1 tab(s) orally once a day  · 	losartan 100 mg oral tablet: Last Dose Taken:  , 1 tab(s) orally once a day  · 	fenofibrate 145 mg oral tablet: Last Dose Taken:  , 1 tab(s) orally once a day  · 	glipizide-metformin 2.5 mg-500 mg oral tablet: Last Dose Taken:  , orally 3 times a day  · 	Levemir 100 units/mL subcutaneous solution: Last Dose Taken:  , 36 units in the morning     Pt seen and examined at the bedside. No complaints. Denies chest pain, HA, vision changes. Eating well. Mildly forgetful.    Endocrine consulted for uncontrolled type 2 dm with A1C 11.3%. She has had diabetes since around age 28. Per patient, diabetes is usually under control. March 2023 - 6.5% and June 2023 - 7.2%. She is on Lantus 30 units at night and farxiga 10mg daily (for her kidneys). Glipizide 2.5-metformin 500mg TID stopped approx 1 year ago. Of note HCTZ 50 mg was started around 3 months ago per patient. PCP decreased dose to 12.5mg in Nov 2023. It was increase back to 25mg today. Otherwise, she denies any changes to diet, weight, or medications since June. She checks FSG fasting daily and range from . She denies any hypoglycemia. Diet is reasonable - Breakfast: grits, eggs, and corn muffin, Lunch: sandwich and salad, Dinner: chicken, spinach and potatoes, Snacks: occasional donna crackers and apples. No sweetened beverages.   Eyes: Cataract removal. Due for laser treatment for retinopathy but postponed due to HTN. Denies neuropathy.  Follow with PCP.    CAPILLARY BLOOD GLUCOSE & INSULIN RECEIVED  136 mg/dL (01-18 @ 12:58)  226 mg/dL (01-18 @ 17:49) - Ate soup, wheat bread, and tea.  242 mg/dL (01-18 @ 22:22) - Lantus 10  180 mg/dL (01-19 @ 00:16)  95 mg/dL (01-19 @ 05:30) - Ate oatmeal, 2 slices toast.  95 mg/dL (01-19 @ 06:26)  202mg/dl (01-19 @ lunch)    Resistant HTN:  Today -110 --> 154/64. HR 80-90.  Followed by nephrology at [facility].  Nov 2023 - Urine metanephrines normal, Vik 10, renin 58.4, renin activity 8.6  Pending outpatient renal artery u/s to r/o JUVENTINO - not yet done as OP. US abd/pelvis dopplers ordered here.  Most common cause of secondary hypertension are primary aldosteronism; Less common causes include pheochromocytoma, Cushing's syndrome, and aortic coarctation.  Now ordered for 24 hour urine cortisol and 1mg dexa suppression test.    Borderline Hypercalcemia:  1/19/24: Calcium 11 with albumin 3.9. Asymptomatic.  Outside lab calcium range 10 - 10.7.  No PTH or Vitamin D available.  Hx of CKD, highest Cr available 3.16 in May 2023.   She takes 2 tums/day, and has 1 serving of milk.    PAST MEDICAL & SURGICAL HISTORY  As per history of present illness.     FAMILY HISTORY  - Diabetes: Yes  - Thyroid: No   - Autoimmune: No  - Other: No known endocrinopathies.        CURRENT MEDICATIONS  acetaminophen     Tablet .. 650 milliGRAM(s) Oral every 6 hours PRN  allopurinol 100 milliGRAM(s) Oral daily  aluminum hydroxide/magnesium hydroxide/simethicone Suspension 30 milliLiter(s) Oral every 4 hours PRN  amLODIPine   Tablet 10 milliGRAM(s) Oral every 24 hours  aspirin enteric coated 81 milliGRAM(s) Oral daily  cloNIDine 0.2 milliGRAM(s) Oral every 12 hours  dextrose 5%. 1000 milliLiter(s) IV Continuous <Continuous>  dextrose 5%. 1000 milliLiter(s) IV Continuous <Continuous>  dextrose 50% Injectable 25 Gram(s) IV Push once  dextrose 50% Injectable 25 Gram(s) IV Push once  dextrose 50% Injectable 12.5 Gram(s) IV Push once  dextrose Oral Gel 15 Gram(s) Oral once PRN  doxazosin 8 milliGRAM(s) Oral at bedtime  enoxaparin Injectable 40 milliGRAM(s) SubCutaneous every 24 hours  glucagon  Injectable 1 milliGRAM(s) IntraMuscular once  hydrochlorothiazide 12.5 milliGRAM(s) Oral every 24 hours  influenza  Vaccine (HIGH DOSE) 0.7 milliLiter(s) IntraMuscular once  insulin glargine Injectable (LANTUS) 10 Unit(s) SubCutaneous at bedtime  insulin lispro (ADMELOG) corrective regimen sliding scale   SubCutaneous at bedtime  losartan 100 milliGRAM(s) Oral daily  melatonin 3 milliGRAM(s) Oral at bedtime PRN  metoprolol succinate  milliGRAM(s) Oral daily  ondansetron Injectable 4 milliGRAM(s) IV Push every 8 hours PRN    REVIEW OF SYSTEMS  Constitutional:  Negative fever, chills or loss of appetite.  Eyes:  Negative blurry vision or double vision.  Cardiovascular:  Negative for chest pain or palpitations.  Respiratory:  Negative for cough, wheezing, or shortness of breath.   Gastrointestinal:  Negative for nausea, vomiting, diarrhea, constipation, or abdominal pain.  Genitourinary:  Negative frequency, urgency or dysuria.  Neurologic:  No headache, confusion, dizziness, lightheadedness.    PHYSICAL EXAM  Vital Signs Last 24 Hrs  T(C): 37.1 (19 Jan 2024 05:27), Max: 37.1 (18 Jan 2024 11:17)  T(F): 98.7 (19 Jan 2024 05:27), Max: 98.7 (18 Jan 2024 11:17)  HR: 92 (19 Jan 2024 07:00) (69 - 111)  BP: 193/77 (19 Jan 2024 07:00) (140/75 - 248/112)  BP(mean): --  RR: 18 (19 Jan 2024 05:27) (16 - 18)  SpO2: 99% (19 Jan 2024 05:27) (94% - 99%)    Parameters below as of 19 Jan 2024 05:27  Patient On (Oxygen Delivery Method): room air    Constitutional: Awake, alert, in no acute distress.   HEENT: Normocephalic, atraumatic, VJ, no proptosis or lid retraction.   Neck: supple, no acanthosis, no thyromegaly or palpable thyroid nodules.  Respiratory: Lungs clear to ausculation bilaterally.   Cardiovascular: regular rhythm, normal S1 and S2, no audible murmurs.   GI: soft, non-tender, non-distended, bowel sounds present, no masses appreciated.  Extremities: No lower extremity edema, peripheral pulses present.   Skin: no rashes.   Psychiatric: AAO x 3. Normal affect/mood.     LABS  CBC - WBC/HGB/HTC/PLT: 8.64/13.0/38.0/188 (01-19-24)  BMP: Na/K/Cl/Bicarb/BUN/Cr/Gluc: 142/4.2/108/24/24/1.40/95 (01-19-24)  Anion Gap: 10 (01-19-24)  eGFR: 38 (01-19-24)  Calcium: 11.0 (01-19-24)  Phosphorus: 2.5 (01-19-24)  Magnesium: 1.7 (01-19-24)  LFT - Alb/Tprot/Tbili/Dbili/AlkPhos/ALT/AST: 3.9/--/0.8/--/145/13/22 (01-19-24)        HISTORY OF PRESENT ILLNESS  79 y/o F w/ PMHx of resistant HTN (follows with nephrologist Dr. Pearson at [place]), HLD, type 2 DM, CKD stage 3, PAD s/p R endarterectomy with stenting (Oct 2022, Dr Viera), and gout colchicine 0.6mg) who presents from PCP/[facility] due to elevated /150s, however asymptomatic. She denies any HA, vision changes, CP, SOB, n/v, abdominal pain, numbness or tingling. Ambulatory BP monitoring is labile usually reads SBP 130s in am and 190s in pm, uses wrist cuff for measuring. Recently, clonidine 0.2 mg patch qwk changed to 0.2 mg q12. Endorses compliance with all medications; clonidine 0.2 mg q12, HCTZ 12.5 mg qd, Toprol 50 mg qd, amlodipine 10 mg qd, Losartan 100 mg qd, terazosin 10 mg qd. Of note, pt with outpatient work-up, pheo and hyperaldosteronism r/o.     In the ED:  Initial vital signs: T: 98.7 F, HR: 111 > 73, BP: 248/112 > 170/68 labetalol x1, 201/88 > 160/68 after labetalol x2, R: 18, SpO2: 96% on RA  Labs: significant for BUN/Cr 28/1.33 (1.97 7/2023), GFR 40, neg trop  EKG: Sinus, RBBB, no ischemia, no prior EKG   Medications: labetalol 10 mg x2, 20 mg x1 IVP, 100 mg PO x1, clonidine 0.1 mg x1    Meds:  · 	furosemide 40 mg oral tablet: Last Dose Taken:  , 1 tab(s) orally once a day  · 	Toprol-XL 50 mg oral tablet, extended release: Last Dose Taken:  , 1 tab(s) orally  · 	aspirin 81 mg oral tablet, chewable: Last Dose Taken:  , 1 tab(s) orally once a day  · 	hydroCHLOROthiazide 12.5 mg oral tablet: Last Dose Taken:  , 1 tab(s) orally  · 	lovastatin 40 mg oral tablet: Last Dose Taken:  , 1 tab(s) orally once a day  · 	terazosin 10 mg oral capsule: Last Dose Taken:  , 1 cap(s) orally once a day (at bedtime)  · 	amLODIPine 10 mg oral tablet: Last Dose Taken:  , 1 tab(s) orally once a day  · 	losartan 100 mg oral tablet: Last Dose Taken:  , 1 tab(s) orally once a day  · 	fenofibrate 145 mg oral tablet: Last Dose Taken:  , 1 tab(s) orally once a day  · 	glipizide-metformin 2.5 mg-500 mg oral tablet: Last Dose Taken:  , orally 3 times a day  · 	Levemir 100 units/mL subcutaneous solution: Last Dose Taken:  , 36 units in the morning     Pt seen and examined at the bedside. No complaints. Denies chest pain, HA, vision changes. Eating well. Mildly forgetful.    Endocrine consulted for uncontrolled type 2 dm with A1C 11.3%. She has had diabetes since around age 28. Per patient, diabetes is usually under control. March 2023 - 6.5% and June 2023 - 7.2%. She is on Lantus 30 units at night and farxiga 10mg daily (for her kidneys). Glipizide 2.5-metformin 500mg TID stopped approx 1 year ago. Of note HCTZ 50 mg was started around 3 months ago per patient. PCP decreased dose to 12.5mg in Nov 2023. It was increase back to 25mg today. Otherwise, she denies any changes to diet, weight, or medications since June. She checks FSG fasting daily and range from . She denies any hypoglycemia. Diet is reasonable - Breakfast: grits, eggs, and corn muffin, Lunch: sandwich and salad, Dinner: chicken, spinach and potatoes, Snacks: occasional donna crackers and apples. No sweetened beverages.   Eyes: Cataract removal. Due for laser treatment for retinopathy but postponed due to HTN. Denies neuropathy.  Follow with PCP.    CAPILLARY BLOOD GLUCOSE & INSULIN RECEIVED  136 mg/dL (01-18 @ 12:58)  226 mg/dL (01-18 @ 17:49) - Ate soup, wheat bread, and tea.  242 mg/dL (01-18 @ 22:22) - Lantus 10  180 mg/dL (01-19 @ 00:16)  95 mg/dL (01-19 @ 05:30) - Ate oatmeal, 2 slices toast.  95 mg/dL (01-19 @ 06:26)  202mg/dl (01-19 @ lunch)    Resistant HTN:  Today -110 --> 154/64. HR 80-90.  Followed by nephrology at [facility].  Nov 2023 - Urine metanephrines normal, Vik 10, renin 58.4, renin activity 8.6  Pending outpatient renal artery u/s to r/o JUVENTINO - not yet done as OP. US abd/pelvis dopplers ordered here.  Most common cause of secondary hypertension are primary aldosteronism; Less common causes include pheochromocytoma, Cushing's syndrome, and aortic coarctation.  No Cushing's stigmata on exam.  Now ordered for 24 hour urine cortisol and 1mg dexa suppression test.    Borderline Hypercalcemia:  1/19/24: Calcium 11 with albumin 3.9. Asymptomatic.  Outside lab calcium range 10 - 10.7.  No PTH or Vitamin D available.  Hx of CKD, highest Cr available 3.16 in May 2023.   She takes 2 tums/day, and has 1 serving of milk.    PAST MEDICAL & SURGICAL HISTORY  As per history of present illness.     FAMILY HISTORY  - Diabetes: Yes  - Thyroid: No   - Autoimmune: No  - Other: No known endocrinopathies.        CURRENT MEDICATIONS  acetaminophen     Tablet .. 650 milliGRAM(s) Oral every 6 hours PRN  allopurinol 100 milliGRAM(s) Oral daily  aluminum hydroxide/magnesium hydroxide/simethicone Suspension 30 milliLiter(s) Oral every 4 hours PRN  amLODIPine   Tablet 10 milliGRAM(s) Oral every 24 hours  aspirin enteric coated 81 milliGRAM(s) Oral daily  cloNIDine 0.2 milliGRAM(s) Oral every 12 hours  dextrose 5%. 1000 milliLiter(s) IV Continuous <Continuous>  dextrose 5%. 1000 milliLiter(s) IV Continuous <Continuous>  dextrose 50% Injectable 25 Gram(s) IV Push once  dextrose 50% Injectable 25 Gram(s) IV Push once  dextrose 50% Injectable 12.5 Gram(s) IV Push once  dextrose Oral Gel 15 Gram(s) Oral once PRN  doxazosin 8 milliGRAM(s) Oral at bedtime  enoxaparin Injectable 40 milliGRAM(s) SubCutaneous every 24 hours  glucagon  Injectable 1 milliGRAM(s) IntraMuscular once  hydrochlorothiazide 12.5 milliGRAM(s) Oral every 24 hours  influenza  Vaccine (HIGH DOSE) 0.7 milliLiter(s) IntraMuscular once  insulin glargine Injectable (LANTUS) 10 Unit(s) SubCutaneous at bedtime  insulin lispro (ADMELOG) corrective regimen sliding scale   SubCutaneous at bedtime  losartan 100 milliGRAM(s) Oral daily  melatonin 3 milliGRAM(s) Oral at bedtime PRN  metoprolol succinate  milliGRAM(s) Oral daily  ondansetron Injectable 4 milliGRAM(s) IV Push every 8 hours PRN    REVIEW OF SYSTEMS  Constitutional:  Negative fever, chills or loss of appetite.  Eyes:  Negative blurry vision or double vision.  Cardiovascular:  Negative for chest pain or palpitations.  Respiratory:  Negative for cough, wheezing, or shortness of breath.   Gastrointestinal:  Negative for nausea, vomiting, diarrhea, constipation, or abdominal pain.  Genitourinary:  Negative frequency, urgency or dysuria.  Neurologic:  No headache, confusion, dizziness, lightheadedness.    PHYSICAL EXAM  Vital Signs Last 24 Hrs  T(C): 37.1 (19 Jan 2024 05:27), Max: 37.1 (18 Jan 2024 11:17)  T(F): 98.7 (19 Jan 2024 05:27), Max: 98.7 (18 Jan 2024 11:17)  HR: 92 (19 Jan 2024 07:00) (69 - 111)  BP: 193/77 (19 Jan 2024 07:00) (140/75 - 248/112)  BP(mean): --  RR: 18 (19 Jan 2024 05:27) (16 - 18)  SpO2: 99% (19 Jan 2024 05:27) (94% - 99%)    Parameters below as of 19 Jan 2024 05:27  Patient On (Oxygen Delivery Method): room air    Constitutional: Awake, alert, in no acute distress.   HEENT: Normocephalic, atraumatic, VJ, no proptosis or lid retraction.   Neck: supple, no acanthosis, no thyromegaly or palpable thyroid nodules.  Respiratory: Lungs clear to ausculation bilaterally.   Cardiovascular: regular rhythm, normal S1 and S2, no audible murmurs.   GI: soft, non-tender, non-distended, bowel sounds present, no masses appreciated.  Extremities: No lower extremity edema, peripheral pulses present.   Skin: no rashes.   Psychiatric: AAO x 3. Normal affect/mood.     LABS  CBC - WBC/HGB/HTC/PLT: 8.64/13.0/38.0/188 (01-19-24)  BMP: Na/K/Cl/Bicarb/BUN/Cr/Gluc: 142/4.2/108/24/24/1.40/95 (01-19-24)  Anion Gap: 10 (01-19-24)  eGFR: 38 (01-19-24)  Calcium: 11.0 (01-19-24)  Phosphorus: 2.5 (01-19-24)  Magnesium: 1.7 (01-19-24)  LFT - Alb/Tprot/Tbili/Dbili/AlkPhos/ALT/AST: 3.9/--/0.8/--/145/13/22 (01-19-24)        HISTORY OF PRESENT ILLNESS  79 y/o F w/ PMHx of resistant HTN (follows with nephrologist Dr. Pearson at [place]), HLD, type 2 DM, CKD stage 3, PAD s/p R endarterectomy with stenting (Oct 2022, Dr Viera), and gout colchicine 0.6mg) who presents from PCP/[facility] due to elevated /150s, however asymptomatic. She denies any HA, vision changes, CP, SOB, n/v, abdominal pain, numbness or tingling. Ambulatory BP monitoring is labile usually reads SBP 130s in am and 190s in pm, uses wrist cuff for measuring. Recently, clonidine 0.2 mg patch qwk changed to 0.2 mg q12. Endorses compliance with all medications; clonidine 0.2 mg q12, HCTZ 12.5 mg qd, Toprol 50 mg qd, amlodipine 10 mg qd, Losartan 100 mg qd, terazosin 10 mg qd. Of note, pt with outpatient work-up, pheo and hyperaldosteronism r/o.     In the ED:  Initial vital signs: T: 98.7 F, HR: 111 > 73, BP: 248/112 > 170/68 labetalol x1, 201/88 > 160/68 after labetalol x2, R: 18, SpO2: 96% on RA  Labs: significant for BUN/Cr 28/1.33 (1.97 7/2023), GFR 40, neg trop  EKG: Sinus, RBBB, no ischemia, no prior EKG   Medications: labetalol 10 mg x2, 20 mg x1 IVP, 100 mg PO x1, clonidine 0.1 mg x1    Meds:  · 	furosemide 40 mg oral tablet: Last Dose Taken:  , 1 tab(s) orally once a day  · 	Toprol-XL 50 mg oral tablet, extended release: Last Dose Taken:  , 1 tab(s) orally  · 	aspirin 81 mg oral tablet, chewable: Last Dose Taken:  , 1 tab(s) orally once a day  · 	hydroCHLOROthiazide 12.5 mg oral tablet: Last Dose Taken:  , 1 tab(s) orally  · 	lovastatin 40 mg oral tablet: Last Dose Taken:  , 1 tab(s) orally once a day  · 	terazosin 10 mg oral capsule: Last Dose Taken:  , 1 cap(s) orally once a day (at bedtime)  · 	amLODIPine 10 mg oral tablet: Last Dose Taken:  , 1 tab(s) orally once a day  · 	losartan 100 mg oral tablet: Last Dose Taken:  , 1 tab(s) orally once a day  · 	fenofibrate 145 mg oral tablet: Last Dose Taken:  , 1 tab(s) orally once a day  · 	Levemir 100 units/mL subcutaneous solution: Last Dose Taken:  , 36 units in the morning     Pt seen and examined at the bedside. No complaints. Denies chest pain, HA, vision changes. Eating well. Mildly forgetful.    Endocrine consulted for uncontrolled type 2 dm with A1C 11.3%. She has had diabetes since around age 28. Per patient, diabetes is usually under control. March 2023 - 6.5% and June 2023 - 7.2%. She is on Lantus 30 units at night and farxiga 10mg daily (for her kidneys). Glipizide 2.5-metformin 500mg TID stopped approx 1 year ago. Of note HCTZ 50 mg was started around 3 months ago per patient. PCP decreased dose to 12.5mg in Nov 2023. It was increase back to 25mg today. Otherwise, she denies any changes to diet, weight, or medications since June. She checks FSG fasting daily and range from . She denies any hypoglycemia. Diet is reasonable - Breakfast: grits, eggs, and corn muffin, Lunch: sandwich and salad, Dinner: chicken, spinach and potatoes, Snacks: occasional donna crackers and apples. No sweetened beverages.   Eyes: Cataract removal. Due for laser treatment for retinopathy but postponed due to HTN. Denies neuropathy.  Follow with PCP.    CAPILLARY BLOOD GLUCOSE & INSULIN RECEIVED  136 mg/dL (01-18 @ 12:58)  226 mg/dL (01-18 @ 17:49) - Ate soup, wheat bread, and tea.  242 mg/dL (01-18 @ 22:22) - Lantus 10  180 mg/dL (01-19 @ 00:16)  95 mg/dL (01-19 @ 05:30) - Ate oatmeal, 2 slices toast.  95 mg/dL (01-19 @ 06:26)  202mg/dl (01-19 @ lunch)    Resistant HTN:  Today -110 --> 154/64. HR 80-90.  Followed by nephrology at [facility].  Nov 2023 - Urine metanephrines normal, Vik 10, renin 58.4, renin activity 8.6  Pending outpatient renal artery u/s to r/o JUVENTINO - not yet done as OP. US abd/pelvis dopplers ordered here.  Most common cause of secondary hypertension are primary aldosteronism; Less common causes include pheochromocytoma, Cushing's syndrome, and aortic coarctation.  No Cushing's stigmata on exam.  Now ordered for 24 hour urine cortisol and 1mg dexa suppression test.    Borderline Hypercalcemia:  1/19/24: Calcium 11 with albumin 3.9. Asymptomatic.  Outside lab calcium range 10 - 10.7.  No PTH or Vitamin D available.  Hx of CKD, highest Cr available 3.16 in May 2023.   She takes 2 tums/day, and has 1 serving of milk.  She takes multivitamin, but no additional calcium or vitamin d supplements.    PAST MEDICAL & SURGICAL HISTORY  As per history of present illness.     FAMILY HISTORY  - Diabetes: Yes  - Thyroid: No   - Autoimmune: No  - Other: No known endocrinopathies.        CURRENT MEDICATIONS  acetaminophen     Tablet .. 650 milliGRAM(s) Oral every 6 hours PRN  allopurinol 100 milliGRAM(s) Oral daily  aluminum hydroxide/magnesium hydroxide/simethicone Suspension 30 milliLiter(s) Oral every 4 hours PRN  amLODIPine   Tablet 10 milliGRAM(s) Oral every 24 hours  aspirin enteric coated 81 milliGRAM(s) Oral daily  cloNIDine 0.2 milliGRAM(s) Oral every 12 hours  dextrose 5%. 1000 milliLiter(s) IV Continuous <Continuous>  dextrose 5%. 1000 milliLiter(s) IV Continuous <Continuous>  dextrose 50% Injectable 25 Gram(s) IV Push once  dextrose 50% Injectable 25 Gram(s) IV Push once  dextrose 50% Injectable 12.5 Gram(s) IV Push once  dextrose Oral Gel 15 Gram(s) Oral once PRN  doxazosin 8 milliGRAM(s) Oral at bedtime  enoxaparin Injectable 40 milliGRAM(s) SubCutaneous every 24 hours  glucagon  Injectable 1 milliGRAM(s) IntraMuscular once  hydrochlorothiazide 12.5 milliGRAM(s) Oral every 24 hours  influenza  Vaccine (HIGH DOSE) 0.7 milliLiter(s) IntraMuscular once  insulin glargine Injectable (LANTUS) 10 Unit(s) SubCutaneous at bedtime  insulin lispro (ADMELOG) corrective regimen sliding scale   SubCutaneous at bedtime  losartan 100 milliGRAM(s) Oral daily  melatonin 3 milliGRAM(s) Oral at bedtime PRN  metoprolol succinate  milliGRAM(s) Oral daily  ondansetron Injectable 4 milliGRAM(s) IV Push every 8 hours PRN    REVIEW OF SYSTEMS  Constitutional:  Negative fever, chills or loss of appetite.  Eyes:  Negative blurry vision or double vision.  Cardiovascular:  Negative for chest pain or palpitations.  Respiratory:  Negative for cough, wheezing, or shortness of breath.   Gastrointestinal:  Negative for nausea, vomiting, diarrhea, constipation, or abdominal pain.  Genitourinary:  Negative frequency, urgency or dysuria.  Neurologic:  No headache, confusion, dizziness, lightheadedness.    PHYSICAL EXAM  Vital Signs Last 24 Hrs  T(C): 37.1 (19 Jan 2024 05:27), Max: 37.1 (18 Jan 2024 11:17)  T(F): 98.7 (19 Jan 2024 05:27), Max: 98.7 (18 Jan 2024 11:17)  HR: 92 (19 Jan 2024 07:00) (69 - 111)  BP: 193/77 (19 Jan 2024 07:00) (140/75 - 248/112)  BP(mean): --  RR: 18 (19 Jan 2024 05:27) (16 - 18)  SpO2: 99% (19 Jan 2024 05:27) (94% - 99%)    Parameters below as of 19 Jan 2024 05:27  Patient On (Oxygen Delivery Method): room air    Constitutional: Awake, alert, in no acute distress.   HEENT: Normocephalic, atraumatic, VJ, no proptosis or lid retraction.   Neck: supple, no acanthosis, no thyromegaly or palpable thyroid nodules.  Respiratory: Lungs clear to ausculation bilaterally.   Cardiovascular: regular rhythm, normal S1 and S2, no audible murmurs.   GI: soft, non-tender, non-distended, bowel sounds present, no masses appreciated.  Extremities: No lower extremity edema, peripheral pulses present.   Skin: no rashes.   Psychiatric: AAO x 3. Normal affect/mood.     LABS  CBC - WBC/HGB/HTC/PLT: 8.64/13.0/38.0/188 (01-19-24)  BMP: Na/K/Cl/Bicarb/BUN/Cr/Gluc: 142/4.2/108/24/24/1.40/95 (01-19-24)  Anion Gap: 10 (01-19-24)  eGFR: 38 (01-19-24)  Calcium: 11.0 (01-19-24)  Phosphorus: 2.5 (01-19-24)  Magnesium: 1.7 (01-19-24)  LFT - Alb/Tprot/Tbili/Dbili/AlkPhos/ALT/AST: 3.9/--/0.8/--/145/13/22 (01-19-24)

## 2024-01-19 NOTE — CONSULT NOTE ADULT - PROBLEM SELECTOR RECOMMENDATION 2
Borderline Hypercalcemia:  1/19/24: Calcium 11 with albumin 3.9. Asymptomatic.  Outside lab calcium range 10 - 10.7.    Stop HCTZ.   Check PTH and 25 Vitamin D level.  Check TSH and free thyroxine.

## 2024-01-20 ENCOUNTER — TRANSCRIPTION ENCOUNTER (OUTPATIENT)
Age: 79
End: 2024-01-20

## 2024-01-20 VITALS
RESPIRATION RATE: 18 BRPM | SYSTOLIC BLOOD PRESSURE: 127 MMHG | TEMPERATURE: 98 F | HEART RATE: 62 BPM | OXYGEN SATURATION: 98 % | DIASTOLIC BLOOD PRESSURE: 69 MMHG

## 2024-01-20 LAB
24R-OH-CALCIDIOL SERPL-MCNC: 25.6 NG/ML — LOW (ref 30–80)
ALBUMIN SERPL ELPH-MCNC: 3.1 G/DL — LOW (ref 3.3–5)
ALP SERPL-CCNC: 116 U/L — SIGNIFICANT CHANGE UP (ref 40–120)
ALT FLD-CCNC: 10 U/L — SIGNIFICANT CHANGE UP (ref 10–45)
ANION GAP SERPL CALC-SCNC: 10 MMOL/L — SIGNIFICANT CHANGE UP (ref 5–17)
AST SERPL-CCNC: 22 U/L — SIGNIFICANT CHANGE UP (ref 10–40)
BASOPHILS # BLD AUTO: 0.09 K/UL — SIGNIFICANT CHANGE UP (ref 0–0.2)
BASOPHILS NFR BLD AUTO: 1.2 % — SIGNIFICANT CHANGE UP (ref 0–2)
BILIRUB SERPL-MCNC: 0.8 MG/DL — SIGNIFICANT CHANGE UP (ref 0.2–1.2)
BUN SERPL-MCNC: 27 MG/DL — HIGH (ref 7–23)
CALCIUM SERPL-MCNC: 10.1 MG/DL — SIGNIFICANT CHANGE UP (ref 8.4–10.5)
CALCIUM SERPL-MCNC: 9.8 MG/DL — SIGNIFICANT CHANGE UP (ref 8.4–10.5)
CHLORIDE SERPL-SCNC: 110 MMOL/L — HIGH (ref 96–108)
CO2 SERPL-SCNC: 20 MMOL/L — LOW (ref 22–31)
CORTIS AM PEAK SERPL-MCNC: 9.93 UG/DL — SIGNIFICANT CHANGE UP (ref 6.02–18.4)
CREAT SERPL-MCNC: 1.55 MG/DL — HIGH (ref 0.5–1.3)
EGFR: 34 ML/MIN/1.73M2 — LOW
EOSINOPHIL # BLD AUTO: 0.34 K/UL — SIGNIFICANT CHANGE UP (ref 0–0.5)
EOSINOPHIL NFR BLD AUTO: 4.5 % — SIGNIFICANT CHANGE UP (ref 0–6)
GLUCOSE BLDC GLUCOMTR-MCNC: 114 MG/DL — HIGH (ref 70–99)
GLUCOSE BLDC GLUCOMTR-MCNC: 116 MG/DL — HIGH (ref 70–99)
GLUCOSE BLDC GLUCOMTR-MCNC: 124 MG/DL — HIGH (ref 70–99)
GLUCOSE BLDC GLUCOMTR-MCNC: 125 MG/DL — HIGH (ref 70–99)
GLUCOSE SERPL-MCNC: 91 MG/DL — SIGNIFICANT CHANGE UP (ref 70–99)
HCT VFR BLD CALC: 34.8 % — SIGNIFICANT CHANGE UP (ref 34.5–45)
HGB BLD-MCNC: 12 G/DL — SIGNIFICANT CHANGE UP (ref 11.5–15.5)
IMM GRANULOCYTES NFR BLD AUTO: 0.4 % — SIGNIFICANT CHANGE UP (ref 0–0.9)
LYMPHOCYTES # BLD AUTO: 2.53 K/UL — SIGNIFICANT CHANGE UP (ref 1–3.3)
LYMPHOCYTES # BLD AUTO: 33.2 % — SIGNIFICANT CHANGE UP (ref 13–44)
MAGNESIUM SERPL-MCNC: 1.8 MG/DL — SIGNIFICANT CHANGE UP (ref 1.6–2.6)
MCHC RBC-ENTMCNC: 27 PG — SIGNIFICANT CHANGE UP (ref 27–34)
MCHC RBC-ENTMCNC: 34.5 GM/DL — SIGNIFICANT CHANGE UP (ref 32–36)
MCV RBC AUTO: 78.2 FL — LOW (ref 80–100)
MONOCYTES # BLD AUTO: 0.8 K/UL — SIGNIFICANT CHANGE UP (ref 0–0.9)
MONOCYTES NFR BLD AUTO: 10.5 % — SIGNIFICANT CHANGE UP (ref 2–14)
NEUTROPHILS # BLD AUTO: 3.83 K/UL — SIGNIFICANT CHANGE UP (ref 1.8–7.4)
NEUTROPHILS NFR BLD AUTO: 50.2 % — SIGNIFICANT CHANGE UP (ref 43–77)
NRBC # BLD: 0 /100 WBCS — SIGNIFICANT CHANGE UP (ref 0–0)
PHOSPHATE SERPL-MCNC: 3.5 MG/DL — SIGNIFICANT CHANGE UP (ref 2.5–4.5)
PLATELET # BLD AUTO: 186 K/UL — SIGNIFICANT CHANGE UP (ref 150–400)
POTASSIUM SERPL-MCNC: 4.8 MMOL/L — SIGNIFICANT CHANGE UP (ref 3.5–5.3)
POTASSIUM SERPL-SCNC: 4.8 MMOL/L — SIGNIFICANT CHANGE UP (ref 3.5–5.3)
PROT SERPL-MCNC: 6.5 G/DL — SIGNIFICANT CHANGE UP (ref 6–8.3)
PTH-INTACT FLD-MCNC: 77 PG/ML — HIGH (ref 15–65)
RBC # BLD: 4.45 M/UL — SIGNIFICANT CHANGE UP (ref 3.8–5.2)
RBC # FLD: 15.4 % — HIGH (ref 10.3–14.5)
SODIUM SERPL-SCNC: 140 MMOL/L — SIGNIFICANT CHANGE UP (ref 135–145)
TSH SERPL-MCNC: 1.26 UIU/ML — SIGNIFICANT CHANGE UP (ref 0.27–4.2)
VIT D25+D1,25 OH+D1,25 PNL SERPL-MCNC: 30.5 PG/ML — SIGNIFICANT CHANGE UP (ref 19.9–79.3)
WBC # BLD: 7.62 K/UL — SIGNIFICANT CHANGE UP (ref 3.8–10.5)
WBC # FLD AUTO: 7.62 K/UL — SIGNIFICANT CHANGE UP (ref 3.8–10.5)

## 2024-01-20 PROCEDURE — 82652 VIT D 1 25-DIHYDROXY: CPT

## 2024-01-20 PROCEDURE — 83835 ASSAY OF METANEPHRINES: CPT

## 2024-01-20 PROCEDURE — 86901 BLOOD TYPING SEROLOGIC RH(D): CPT

## 2024-01-20 PROCEDURE — 96376 TX/PRO/DX INJ SAME DRUG ADON: CPT

## 2024-01-20 PROCEDURE — 86850 RBC ANTIBODY SCREEN: CPT

## 2024-01-20 PROCEDURE — 82310 ASSAY OF CALCIUM: CPT

## 2024-01-20 PROCEDURE — 85025 COMPLETE CBC W/AUTO DIFF WBC: CPT

## 2024-01-20 PROCEDURE — 82962 GLUCOSE BLOOD TEST: CPT

## 2024-01-20 PROCEDURE — 83735 ASSAY OF MAGNESIUM: CPT

## 2024-01-20 PROCEDURE — 36415 COLL VENOUS BLD VENIPUNCTURE: CPT

## 2024-01-20 PROCEDURE — 84443 ASSAY THYROID STIM HORMONE: CPT

## 2024-01-20 PROCEDURE — 71045 X-RAY EXAM CHEST 1 VIEW: CPT

## 2024-01-20 PROCEDURE — 99239 HOSP IP/OBS DSCHRG MGMT >30: CPT | Mod: GC

## 2024-01-20 PROCEDURE — 99291 CRITICAL CARE FIRST HOUR: CPT | Mod: 25

## 2024-01-20 PROCEDURE — 84100 ASSAY OF PHOSPHORUS: CPT

## 2024-01-20 PROCEDURE — 83970 ASSAY OF PARATHORMONE: CPT

## 2024-01-20 PROCEDURE — 84484 ASSAY OF TROPONIN QUANT: CPT

## 2024-01-20 PROCEDURE — 86900 BLOOD TYPING SEROLOGIC ABO: CPT

## 2024-01-20 PROCEDURE — 82306 VITAMIN D 25 HYDROXY: CPT

## 2024-01-20 PROCEDURE — 80053 COMPREHEN METABOLIC PANEL: CPT

## 2024-01-20 PROCEDURE — 80048 BASIC METABOLIC PNL TOTAL CA: CPT

## 2024-01-20 PROCEDURE — 96374 THER/PROPH/DIAG INJ IV PUSH: CPT

## 2024-01-20 PROCEDURE — 93005 ELECTROCARDIOGRAM TRACING: CPT

## 2024-01-20 PROCEDURE — 83036 HEMOGLOBIN GLYCOSYLATED A1C: CPT

## 2024-01-20 PROCEDURE — 84436 ASSAY OF TOTAL THYROXINE: CPT

## 2024-01-20 PROCEDURE — 82533 TOTAL CORTISOL: CPT

## 2024-01-20 RX ORDER — HYDROCHLOROTHIAZIDE 25 MG
1 TABLET ORAL
Refills: 0 | DISCHARGE

## 2024-01-20 RX ORDER — INSULIN GLARGINE 100 [IU]/ML
10 INJECTION, SOLUTION SUBCUTANEOUS
Refills: 0 | DISCHARGE

## 2024-01-20 RX ORDER — METFORMIN HYDROCHLORIDE 850 MG/1
1 TABLET ORAL
Qty: 30 | Refills: 0
Start: 2024-01-20 | End: 2024-02-18

## 2024-01-20 RX ORDER — METOPROLOL TARTRATE 50 MG
2 TABLET ORAL
Qty: 0 | Refills: 0 | DISCHARGE

## 2024-01-20 RX ORDER — INSULIN LISPRO 100/ML
6 VIAL (ML) SUBCUTANEOUS
Qty: 2 | Refills: 0
Start: 2024-01-20 | End: 2024-02-18

## 2024-01-20 RX ORDER — DAPAGLIFLOZIN 10 MG/1
1 TABLET, FILM COATED ORAL
Refills: 0 | DISCHARGE

## 2024-01-20 RX ORDER — INSULIN GLARGINE 100 [IU]/ML
5 INJECTION, SOLUTION SUBCUTANEOUS
Qty: 1.5 | Refills: 0
Start: 2024-01-20 | End: 2024-02-18

## 2024-01-20 RX ADMIN — Medication 6 UNIT(S): at 13:31

## 2024-01-20 RX ADMIN — HEPARIN SODIUM 5000 UNIT(S): 5000 INJECTION INTRAVENOUS; SUBCUTANEOUS at 05:47

## 2024-01-20 RX ADMIN — Medication 100 MILLIGRAM(S): at 13:32

## 2024-01-20 RX ADMIN — LOSARTAN POTASSIUM 100 MILLIGRAM(S): 100 TABLET, FILM COATED ORAL at 05:46

## 2024-01-20 RX ADMIN — Medication 81 MILLIGRAM(S): at 13:32

## 2024-01-20 RX ADMIN — METFORMIN HYDROCHLORIDE 500 MILLIGRAM(S): 850 TABLET ORAL at 10:04

## 2024-01-20 RX ADMIN — Medication 6 UNIT(S): at 09:24

## 2024-01-20 RX ADMIN — HEPARIN SODIUM 5000 UNIT(S): 5000 INJECTION INTRAVENOUS; SUBCUTANEOUS at 13:32

## 2024-01-20 RX ADMIN — Medication 100 MILLIGRAM(S): at 05:45

## 2024-01-20 NOTE — PROGRESS NOTE ADULT - SUBJECTIVE AND OBJECTIVE BOX
SUBJECTIVE / INTERVAL HPI: Patient was seen and examined this morning.     Overnight events:      HPI  79 y/o F w/ PMHx of resistant HTN (follows with nephrologist Dr. Pearson at [facility]), HLD, type 2 DM, CKD stage 3, PAD s/p R endarterectomy with stenting (Oct 2022, Dr Viera), and gout colchicine 0.6mg) who presents from PCP/[facility] due to elevated /150s, however asymptomatic. She denies any HA, vision changes, CP, SOB, n/v, abdominal pain, numbness or tingling. Ambulatory BP monitoring is labile usually reads SBP 130s in am and 190s in pm, uses wrist cuff for measuring. Recently, clonidine 0.2 mg patch qwk changed to 0.2 mg q12. Endorses compliance with all medications; clonidine 0.2 mg q12, HCTZ 12.5 mg qd, Toprol 50 mg qd, amlodipine 10 mg qd, Losartan 100 mg qd, terazosin 10 mg qd. Of note, pt with outpatient work-up, pheo and hyperaldosteronism r/o.     In the ED:  Initial vital signs: T: 98.7 F, HR: 111 > 73, BP: 248/112 > 170/68 labetalol x1, 201/88 > 160/68 after labetalol x2, R: 18, SpO2: 96% on RA  Labs: significant for BUN/Cr 28/1.33 (1.97 7/2023), GFR 40, neg trop  EKG: Sinus, RBBB, no ischemia, no prior EKG   Medications: labetalol 10 mg x2, 20 mg x1 IVP, 100 mg PO x1, clonidine 0.1 mg x1    Meds:  · 	furosemide 40 mg oral tablet: Last Dose Taken:  , 1 tab(s) orally once a day  · 	Toprol-XL 50 mg oral tablet, extended release: Last Dose Taken:  , 1 tab(s) orally  · 	aspirin 81 mg oral tablet, chewable: Last Dose Taken:  , 1 tab(s) orally once a day  · 	hydroCHLOROthiazide 12.5 mg oral tablet: Last Dose Taken:  , 1 tab(s) orally  · 	lovastatin 40 mg oral tablet: Last Dose Taken:  , 1 tab(s) orally once a day  · 	terazosin 10 mg oral capsule: Last Dose Taken:  , 1 cap(s) orally once a day (at bedtime)  · 	amLODIPine 10 mg oral tablet: Last Dose Taken:  , 1 tab(s) orally once a day  · 	losartan 100 mg oral tablet: Last Dose Taken:  , 1 tab(s) orally once a day  · 	fenofibrate 145 mg oral tablet: Last Dose Taken:  , 1 tab(s) orally once a day  · 	Levemir 100 units/mL subcutaneous solution: Last Dose Taken:  , 36 units in the morning     Pt seen and examined at the bedside. No complaints. Denies chest pain, HA, vision changes. Eating well. Mildly forgetful.    Endocrine consulted for uncontrolled type 2 dm with A1C 11.3%. She has had diabetes since around age 28. Per patient, diabetes is usually under control. March 2023 - 6.5% and June 2023 - 7.2%. She is on Lantus 30 units at night and farxiga 10mg daily (for her kidneys). Glipizide 2.5-metformin 500mg TID stopped approx 1 year ago. Of note HCTZ 50 mg was started around 3 months ago per patient. PCP decreased dose to 12.5mg in Nov 2023. It was increase back to 25mg today. Otherwise, she denies any changes to diet, weight, or medications since June. She checks FSG fasting daily and range from . She denies any hypoglycemia. Diet is reasonable - Breakfast: grits, eggs, and corn muffin, Lunch: sandwich and salad, Dinner: chicken, spinach and potatoes, Snacks: occasional donna crackers and apples. No sweetened beverages.   Eyes: Cataract removal. Due for laser treatment for retinopathy but postponed due to HTN. Denies neuropathy.  Follow with PCP.    CAPILLARY BLOOD GLUCOSE & INSULIN RECEIVED  180 mg/dL (01-19 @ 00:16)  95 mg/dL (01-19 @ 06:26)  202 mg/dL (01-19 @ 12:47) 4  163 mg/dL (01-19 @ 17:59) 2  163 mg/dL (01-19 @ 22:29) 5 + 2  114 mg/dL (01-20 @ 00:10)  125 mg/dL (01-20 @ 07:15)  124 mg/dL (01-20 @ 09:15) 6    REVIEW OF SYSTEMS  Constitutional:  Negative fever, chills or loss of appetite.  Eyes:  Negative blurry vision or double vision.  Cardiovascular:  Negative for chest pain or palpitations.  Respiratory:  Negative for cough, wheezing, or shortness of breath.   Gastrointestinal:  Negative for nausea, vomiting, diarrhea, constipation, or abdominal pain.  Genitourinary:  Negative frequency, urgency or dysuria.  Neurologic:  No headache, confusion, dizziness, lightheadedness.    PHYSICAL EXAM  Vital Signs Last 24 Hrs  T(C): 36.4 (20 Jan 2024 05:03), Max: 36.5 (19 Jan 2024 20:27)  T(F): 97.5 (20 Jan 2024 05:03), Max: 97.7 (19 Jan 2024 20:27)  HR: 82 (20 Jan 2024 05:03) (65 - 88)  BP: 138/62 (20 Jan 2024 05:03) (111/68 - 154/64)  BP(mean): --  RR: 18 (20 Jan 2024 05:03) (18 - 18)  SpO2: 94% (20 Jan 2024 05:03) (94% - 99%)    Parameters below as of 20 Jan 2024 05:03  Patient On (Oxygen Delivery Method): room air      Constitutional: Awake, alert, in no acute distress.   HEENT: Normocephalic, atraumatic, VJ, no proptosis or lid retraction.   Neck: supple, no acanthosis, no thyromegaly or palpable thyroid nodules.  Respiratory: Lungs clear to ausculation bilaterally.   Cardiovascular: regular rhythm, normal S1 and S2, no audible murmurs.   GI: soft, non-tender, non-distended, bowel sounds present, no masses appreciated.  Extremities: No lower extremity edema, peripheral pulses present.   Skin: no rashes.   Psychiatric: AAO x 3. Normal affect/mood.       LABS  CBC - WBC/HGB/HTC/PLT: 7.62/12.0/34.8/186 (01-20-24)  BMP - Na/K/Cl/Bicarb/BUN/Cr/Gluc/AG/eGFR: 140/4.8/110/20/27/1.55/91/10/34 (01-20-24)  Ca - 9.8 (01-20-24)  Phos - 3.5 (01-20-24)  Mg - 1.8 (01-20-24)  LFT - Alb/Tprot/Tbili/Dbili/AlkPhos/ALT/AST: 3.1/--/0.8/--/116/10/22 (01-20-24)    Thyroid Stimulating Hormone, Serum: 1.260 (01-20-24)          MEDICATIONS  MEDICATIONS  (STANDING):  allopurinol 100 milliGRAM(s) Oral daily  amLODIPine   Tablet 10 milliGRAM(s) Oral every 24 hours  aspirin enteric coated 81 milliGRAM(s) Oral daily  cloNIDine 0.2 milliGRAM(s) Oral every 12 hours  dextrose 5%. 1000 milliLiter(s) (100 mL/Hr) IV Continuous <Continuous>  dextrose 5%. 1000 milliLiter(s) (50 mL/Hr) IV Continuous <Continuous>  dextrose 50% Injectable 25 Gram(s) IV Push once  dextrose 50% Injectable 25 Gram(s) IV Push once  dextrose 50% Injectable 12.5 Gram(s) IV Push once  doxazosin 8 milliGRAM(s) Oral at bedtime  glucagon  Injectable 1 milliGRAM(s) IntraMuscular once  heparin   Injectable 5000 Unit(s) SubCutaneous every 8 hours  influenza  Vaccine (HIGH DOSE) 0.7 milliLiter(s) IntraMuscular once  insulin glargine Injectable (LANTUS) 5 Unit(s) SubCutaneous at bedtime  insulin lispro (ADMELOG) corrective regimen sliding scale   SubCutaneous Before meals and at bedtime  insulin lispro Injectable (ADMELOG) 6 Unit(s) SubCutaneous three times a day before meals  losartan 100 milliGRAM(s) Oral daily  metFORMIN 500 milliGRAM(s) Oral every 12 hours  metoprolol succinate  milliGRAM(s) Oral daily    MEDICATIONS  (PRN):  acetaminophen     Tablet .. 650 milliGRAM(s) Oral every 6 hours PRN Temp greater or equal to 38C (100.4F), Mild Pain (1 - 3)  aluminum hydroxide/magnesium hydroxide/simethicone Suspension 30 milliLiter(s) Oral every 4 hours PRN Dyspepsia  dextrose Oral Gel 15 Gram(s) Oral once PRN Blood Glucose LESS THAN 70 milliGRAM(s)/deciliter  melatonin 3 milliGRAM(s) Oral at bedtime PRN Insomnia  ondansetron Injectable 4 milliGRAM(s) IV Push every 8 hours PRN Nausea and/or Vomiting    ASSESSMENT / RECOMMENDATIONS    79 y/o F w/ PMHx of resistant HTN (follows with nephrologist Dr. Pearson at [facility]), HLD, PAD admitted for management of hypertensive urgency, also with uncontrolled type 2 diabetes with hyperglycemia.    A1C: 11.3 %  BUN: 27  Creatinine: 1.55  GFR: 34  Weight: 78  BMI: 28.6    # Type 2 diabetes mellitus with hyperglycemia  - Start metformin 500mg twice daily with breakfast and dinner.  - Please start lantus 5 units at bedtime.   - Start lispro 6 units before each meal.  - Continue lispro moderate dose sliding scale before meals and at bedtime.  - Patient's fingerstick glucose goal is 100-180 mg/dL.    -   - Patient can follow up at discharge with [facility] by calling [phone] to make an appointment.      # Borderline Hypercalcemia:  1/19/24: Calcium 11 with albumin 3.9. Asymptomatic.  Outside lab calcium range 10 - 10.7.  TSH 1.26    Stop HCTZ.   Check PTH and 25 Vitamin D level.  Check TSH and free thyroxine.    Case discussed with Dr. Pennington Primary team updated.       Margo Fuentes  Endocrinology Fellow    Service Pager: [phone]    SUBJECTIVE / INTERVAL HPI: Patient was seen and examined this morning.     Overnight events:  feeling well  afebrile hemodynamically stable  denies fevers, chills, nausea, vomiting    CAPILLARY BLOOD GLUCOSE & INSULIN RECEIVED  180 mg/dL (01-19 @ 00:16)  95 mg/dL (01-19 @ 06:26)  202 mg/dL (01-19 @ 12:47) 4  163 mg/dL (01-19 @ 17:59) 2  163 mg/dL (01-19 @ 22:29) 5 + 2  114 mg/dL (01-20 @ 00:10)  125 mg/dL (01-20 @ 07:15)  124 mg/dL (01-20 @ 09:15) 6    REVIEW OF SYSTEMS  Constitutional:  Negative fever, chills or loss of appetite.  Eyes:  Negative blurry vision or double vision.  Cardiovascular:  Negative for chest pain or palpitations.  Respiratory:  Negative for cough, wheezing, or shortness of breath.   Gastrointestinal:  Negative for nausea, vomiting, diarrhea, constipation, or abdominal pain.  Genitourinary:  Negative frequency, urgency or dysuria.  Neurologic:  No headache, confusion, dizziness, lightheadedness.    PHYSICAL EXAM  Vital Signs Last 24 Hrs  T(C): 36.4 (20 Jan 2024 05:03), Max: 36.5 (19 Jan 2024 20:27)  T(F): 97.5 (20 Jan 2024 05:03), Max: 97.7 (19 Jan 2024 20:27)  HR: 82 (20 Jan 2024 05:03) (65 - 88)  BP: 138/62 (20 Jan 2024 05:03) (111/68 - 154/64)  BP(mean): --  RR: 18 (20 Jan 2024 05:03) (18 - 18)  SpO2: 94% (20 Jan 2024 05:03) (94% - 99%)    Parameters below as of 20 Jan 2024 05:03  Patient On (Oxygen Delivery Method): room air      Constitutional: Awake, alert, in no acute distress.   HEENT: Normocephalic, atraumatic, VJ, no proptosis or lid retraction.   Neck: supple, no acanthosis, no thyromegaly or palpable thyroid nodules.  Respiratory: Lungs clear to ausculation bilaterally.   Cardiovascular: regular rhythm, normal S1 and S2, no audible murmurs.   GI: soft, non-tender, non-distended, bowel sounds present, no masses appreciated.  Extremities: No lower extremity edema, peripheral pulses present.   Skin: no rashes.   Psychiatric: AAO x 3. Normal affect/mood.       LABS  CBC - WBC/HGB/HTC/PLT: 7.62/12.0/34.8/186 (01-20-24)  BMP - Na/K/Cl/Bicarb/BUN/Cr/Gluc/AG/eGFR: 140/4.8/110/20/27/1.55/91/10/34 (01-20-24)  Ca - 9.8 (01-20-24)  Phos - 3.5 (01-20-24)  Mg - 1.8 (01-20-24)  LFT - Alb/Tprot/Tbili/Dbili/AlkPhos/ALT/AST: 3.1/--/0.8/--/116/10/22 (01-20-24)    Thyroid Stimulating Hormone, Serum: 1.260 (01-20-24)          MEDICATIONS  MEDICATIONS  (STANDING):  allopurinol 100 milliGRAM(s) Oral daily  amLODIPine   Tablet 10 milliGRAM(s) Oral every 24 hours  aspirin enteric coated 81 milliGRAM(s) Oral daily  cloNIDine 0.2 milliGRAM(s) Oral every 12 hours  dextrose 5%. 1000 milliLiter(s) (100 mL/Hr) IV Continuous <Continuous>  dextrose 5%. 1000 milliLiter(s) (50 mL/Hr) IV Continuous <Continuous>  dextrose 50% Injectable 25 Gram(s) IV Push once  dextrose 50% Injectable 25 Gram(s) IV Push once  dextrose 50% Injectable 12.5 Gram(s) IV Push once  doxazosin 8 milliGRAM(s) Oral at bedtime  glucagon  Injectable 1 milliGRAM(s) IntraMuscular once  heparin   Injectable 5000 Unit(s) SubCutaneous every 8 hours  influenza  Vaccine (HIGH DOSE) 0.7 milliLiter(s) IntraMuscular once  insulin glargine Injectable (LANTUS) 5 Unit(s) SubCutaneous at bedtime  insulin lispro (ADMELOG) corrective regimen sliding scale   SubCutaneous Before meals and at bedtime  insulin lispro Injectable (ADMELOG) 6 Unit(s) SubCutaneous three times a day before meals  losartan 100 milliGRAM(s) Oral daily  metFORMIN 500 milliGRAM(s) Oral every 12 hours  metoprolol succinate  milliGRAM(s) Oral daily    MEDICATIONS  (PRN):  acetaminophen     Tablet .. 650 milliGRAM(s) Oral every 6 hours PRN Temp greater or equal to 38C (100.4F), Mild Pain (1 - 3)  aluminum hydroxide/magnesium hydroxide/simethicone Suspension 30 milliLiter(s) Oral every 4 hours PRN Dyspepsia  dextrose Oral Gel 15 Gram(s) Oral once PRN Blood Glucose LESS THAN 70 milliGRAM(s)/deciliter  melatonin 3 milliGRAM(s) Oral at bedtime PRN Insomnia  ondansetron Injectable 4 milliGRAM(s) IV Push every 8 hours PRN Nausea and/or Vomiting    ASSESSMENT / RECOMMENDATIONS    77 y/o F w/ PMHx of resistant HTN (follows with nephrologist Dr. Pearson at [place]), HLD, PAD admitted for management of hypertensive urgency, also with uncontrolled type 2 diabetes with hyperglycemia.    A1C: 11.3 %  BUN: 27  Creatinine: 1.55  GFR: 34  Weight: 78  BMI: 28.6    # Type 2 diabetes mellitus with hyperglycemia  - Start metformin 500mg twice daily with breakfast and dinner.  - Please start lantus 5 units at bedtime.   - Start lispro 6 units before each meal.  - Continue lispro moderate dose sliding scale before meals and at bedtime.  - Patient's fingerstick glucose goal is 100-180 mg/dL.    - DC recs: metformin 500 daily + lantus 5u nightly + lispro 6 units with meals  - Patient can follow up at discharge with [facility] by calling [phone] to make an appointment.      # Borderline Hypercalcemia:  1/19/24: Calcium 11 with albumin 3.9. Asymptomatic.  Outside lab calcium range 10 - 10.7.  TSH 1.26  Stop HCTZ.   Check PTH and 25 Vitamin D level.    # Hypertensive emergency.    Nov 2023 - Urine metanephrines normal, Vik 10, renin 58.4, renin activity 8.6  Pending outpatient renal artery u/s to r/o JUVENTINO - not yet done as OP. US abd/pelvis dopplers ordered here.  will check plasma fractionated metanephrines    Case discussed with Dr. Pennington Primary team updated.       Margo Fuentes  Endocrinology Fellow    Service Pager: [phone]    SUBJECTIVE / INTERVAL HPI: Patient was seen and examined this morning.     Overnight events:  feeling well  afebrile hemodynamically stable  denies fevers, chills, nausea, vomiting    CAPILLARY BLOOD GLUCOSE & INSULIN RECEIVED  180 mg/dL (01-19 @ 00:16)  95 mg/dL (01-19 @ 06:26)  202 mg/dL (01-19 @ 12:47) 4  163 mg/dL (01-19 @ 17:59) 2  163 mg/dL (01-19 @ 22:29) 5 + 2  114 mg/dL (01-20 @ 00:10)  125 mg/dL (01-20 @ 07:15)  124 mg/dL (01-20 @ 09:15) 6    REVIEW OF SYSTEMS  Constitutional:  Negative fever, chills or loss of appetite.  Eyes:  Negative blurry vision or double vision.  Cardiovascular:  Negative for chest pain or palpitations.  Respiratory:  Negative for cough, wheezing, or shortness of breath.   Gastrointestinal:  Negative for nausea, vomiting, diarrhea, constipation, or abdominal pain.  Genitourinary:  Negative frequency, urgency or dysuria.  Neurologic:  No headache, confusion, dizziness, lightheadedness.    PHYSICAL EXAM  Vital Signs Last 24 Hrs  T(C): 36.4 (20 Jan 2024 05:03), Max: 36.5 (19 Jan 2024 20:27)  T(F): 97.5 (20 Jan 2024 05:03), Max: 97.7 (19 Jan 2024 20:27)  HR: 82 (20 Jan 2024 05:03) (65 - 88)  BP: 138/62 (20 Jan 2024 05:03) (111/68 - 154/64)  BP(mean): --  RR: 18 (20 Jan 2024 05:03) (18 - 18)  SpO2: 94% (20 Jan 2024 05:03) (94% - 99%)    Parameters below as of 20 Jan 2024 05:03  Patient On (Oxygen Delivery Method): room air      Constitutional: Awake, alert, in no acute distress.   HEENT: Normocephalic, atraumatic, VJ, no proptosis or lid retraction.   Neck: supple, no acanthosis, no thyromegaly or palpable thyroid nodules.  Respiratory: Lungs clear to ausculation bilaterally.   Cardiovascular: regular rhythm, normal S1 and S2, no audible murmurs.   GI: soft, non-tender, non-distended, bowel sounds present, no masses appreciated.  Extremities: No lower extremity edema, peripheral pulses present.   Skin: no rashes.   Psychiatric: AAO x 3. Normal affect/mood.       LABS  CBC - WBC/HGB/HTC/PLT: 7.62/12.0/34.8/186 (01-20-24)  BMP - Na/K/Cl/Bicarb/BUN/Cr/Gluc/AG/eGFR: 140/4.8/110/20/27/1.55/91/10/34 (01-20-24)  Ca - 9.8 (01-20-24)  Phos - 3.5 (01-20-24)  Mg - 1.8 (01-20-24)  LFT - Alb/Tprot/Tbili/Dbili/AlkPhos/ALT/AST: 3.1/--/0.8/--/116/10/22 (01-20-24)    Thyroid Stimulating Hormone, Serum: 1.260 (01-20-24)          MEDICATIONS  MEDICATIONS  (STANDING):  allopurinol 100 milliGRAM(s) Oral daily  amLODIPine   Tablet 10 milliGRAM(s) Oral every 24 hours  aspirin enteric coated 81 milliGRAM(s) Oral daily  cloNIDine 0.2 milliGRAM(s) Oral every 12 hours  dextrose 5%. 1000 milliLiter(s) (100 mL/Hr) IV Continuous <Continuous>  dextrose 5%. 1000 milliLiter(s) (50 mL/Hr) IV Continuous <Continuous>  dextrose 50% Injectable 25 Gram(s) IV Push once  dextrose 50% Injectable 25 Gram(s) IV Push once  dextrose 50% Injectable 12.5 Gram(s) IV Push once  doxazosin 8 milliGRAM(s) Oral at bedtime  glucagon  Injectable 1 milliGRAM(s) IntraMuscular once  heparin   Injectable 5000 Unit(s) SubCutaneous every 8 hours  influenza  Vaccine (HIGH DOSE) 0.7 milliLiter(s) IntraMuscular once  insulin glargine Injectable (LANTUS) 5 Unit(s) SubCutaneous at bedtime  insulin lispro (ADMELOG) corrective regimen sliding scale   SubCutaneous Before meals and at bedtime  insulin lispro Injectable (ADMELOG) 6 Unit(s) SubCutaneous three times a day before meals  losartan 100 milliGRAM(s) Oral daily  metFORMIN 500 milliGRAM(s) Oral every 12 hours  metoprolol succinate  milliGRAM(s) Oral daily    MEDICATIONS  (PRN):  acetaminophen     Tablet .. 650 milliGRAM(s) Oral every 6 hours PRN Temp greater or equal to 38C (100.4F), Mild Pain (1 - 3)  aluminum hydroxide/magnesium hydroxide/simethicone Suspension 30 milliLiter(s) Oral every 4 hours PRN Dyspepsia  dextrose Oral Gel 15 Gram(s) Oral once PRN Blood Glucose LESS THAN 70 milliGRAM(s)/deciliter  melatonin 3 milliGRAM(s) Oral at bedtime PRN Insomnia  ondansetron Injectable 4 milliGRAM(s) IV Push every 8 hours PRN Nausea and/or Vomiting    ASSESSMENT / RECOMMENDATIONS    79 y/o F w/ PMHx of resistant HTN (follows with nephrologist Dr. Pearson at [place]), HLD, PAD admitted for management of hypertensive urgency, also with uncontrolled type 2 diabetes with hyperglycemia.    A1C: 11.3 %  BUN: 27  Creatinine: 1.55  GFR: 34  Weight: 78  BMI: 28.6    # Type 2 diabetes mellitus with hyperglycemia  - Start metformin 500mg twice daily with breakfast and dinner.  - Please start lantus 5 units at bedtime.   - Start lispro 6 units before each meal.  - Continue lispro moderate dose sliding scale before meals and at bedtime.  - Patient's fingerstick glucose goal is 100-180 mg/dL.    - DC recs: metformin 500 daily + lantus 5u nightly + lispro 6 units with meals  - Patient can follow up at discharge with [facility] by calling [phone] to make an appointment.      # Borderline Hypercalcemia:  today correct calcium 10.5  TSH 1.26  recommend staying off HCTZ and utilize alternate diuretics such as torsemide  will f/u PTH and 25 Vitamin D level.    # Hypertensive emergency.    Nov 2023 - Urine metanephrines normal, Vik 10, renin 58.4, renin activity 8.6  Pending outpatient renal artery u/s to r/o JUVENTINO - not yet done as OP. US abd/pelvis dopplers ordered here.  will check plasma fractionated metanephrines  pt need to f/u with endocrinology outpatient    Case discussed with Dr. Pennington Primary team updated.       Margo Fuentes  Endocrinology Fellow    Service Pager: [phone]

## 2024-01-20 NOTE — PROGRESS NOTE ADULT - ATTENDING COMMENTS
The patient was readmitted with /150 on five antihypertension medications/ metanephrines were negative x1 in the past and are now pending again. Serum calcium is elevated to 11.0 mg%. Hgb A1C is 11.3%.glucose levels were 163-163 last night and today 124-116. I agree with treatment with 5 units Lantus Insulin and 6 units Lispro Insulin pre-meals.
79 y/o F w/ PMHx of resistant HTN, HLD, IDDM type 2, CKD stage 3, PAD s/p R endarterectomy with stenting who presented from PCP/[facility] due to elevated /150. admitted for HTN emergency with CHIQUITA on CKD     htn emergency   s/p Clonidine 0.1mg, labetalol 100mg PO, Labetalol 20mg IV x1 and Labetalol 10mg IV x2 in the ED. sBP now 140 -170   currently on clonidine .2 bid , increased HCTZ to 25 mg , toprol xl 100 mg qd, losartan 100 qd  If SBP >180 mmHg persistently, [ ] Get bladder scan [ ] if  bladder scan shows no urinary retention, can use labetalol 10mg IV push if HR >60 bpm or hydralazine IV 5mg if HR <60  Will continue to gradually and cautiously lower patient's MAP by approximately 10 to 20% followed by an additional 5 to 15% over the coming 23 hours, with a target goal BP of <180/<120mmHg and <160/<110mmHg respectively      CHIQUITA on CKD 3  Cr 1.26 on 12/09/23, 1.33 this admission  cr trended up to 1.4   Continue to trend and avoid nephrotoxins       T2DM  On Lantus 30 at home,  started on lantus 10U and BG well controlled   cw hSS+FS  a1c 11  fu endo recs     Gout  Continue home allopurinol -- change to renally dosed if cr is worsening     dvt ppx SQH

## 2024-01-20 NOTE — DISCHARGE NOTE NURSING/CASE MANAGEMENT/SOCIAL WORK - NSDCPEFALRISK_GEN_ALL_CORE
For information on Fall & Injury Prevention, visit: [email or web address] OR  [email or web address] OR  https://www.cdc.gov/steadi/patient.html

## 2024-01-20 NOTE — DISCHARGE NOTE NURSING/CASE MANAGEMENT/SOCIAL WORK - PATIENT PORTAL LINK FT
You can access the FollowMyHealth Patient Portal offered by [facility] by registering at the following website: [email or web address]. By joining [employer]’s FollowMyHealth portal, you will also be able to view your health information using other applications (apps) compatible with our system.

## 2024-01-20 NOTE — DISCHARGE NOTE NURSING/CASE MANAGEMENT/SOCIAL WORK - NSDCFUADDAPPT_GEN_ALL_CORE_FT
Please follow up with [facility] Arevalo on 2/13/24    Please also follow up with [facility] by calling [phone] to make an appointment

## 2024-01-22 ENCOUNTER — NON-APPOINTMENT (OUTPATIENT)
Age: 79
End: 2024-01-22

## 2024-01-22 PROBLEM — E83.52 HYPERCALCEMIA: Chronic | Status: ACTIVE | Noted: 2024-01-19

## 2024-01-24 LAB — T4 SERPL-MCNC: 9 UG/DL — SIGNIFICANT CHANGE UP

## 2024-01-25 DIAGNOSIS — Z79.4 LONG TERM (CURRENT) USE OF INSULIN: ICD-10-CM

## 2024-01-25 DIAGNOSIS — E83.52 HYPERCALCEMIA: ICD-10-CM

## 2024-01-25 DIAGNOSIS — E11.51 TYPE 2 DIABETES MELLITUS WITH DIABETIC PERIPHERAL ANGIOPATHY WITHOUT GANGRENE: ICD-10-CM

## 2024-01-25 DIAGNOSIS — N18.30 CHRONIC KIDNEY DISEASE, STAGE 3 UNSPECIFIED: ICD-10-CM

## 2024-01-25 DIAGNOSIS — Z95.828 PRESENCE OF OTHER VASCULAR IMPLANTS AND GRAFTS: ICD-10-CM

## 2024-01-25 DIAGNOSIS — N17.9 ACUTE KIDNEY FAILURE, UNSPECIFIED: ICD-10-CM

## 2024-01-25 DIAGNOSIS — I16.1 HYPERTENSIVE EMERGENCY: ICD-10-CM

## 2024-01-25 DIAGNOSIS — Z79.82 LONG TERM (CURRENT) USE OF ASPIRIN: ICD-10-CM

## 2024-01-25 DIAGNOSIS — Z79.84 LONG TERM (CURRENT) USE OF ORAL HYPOGLYCEMIC DRUGS: ICD-10-CM

## 2024-01-25 DIAGNOSIS — I1A.0 RESISTANT HYPERTENSION: ICD-10-CM

## 2024-01-25 DIAGNOSIS — Z90.710 ACQUIRED ABSENCE OF BOTH CERVIX AND UTERUS: ICD-10-CM

## 2024-01-25 DIAGNOSIS — E11.22 TYPE 2 DIABETES MELLITUS WITH DIABETIC CHRONIC KIDNEY DISEASE: ICD-10-CM

## 2024-01-25 DIAGNOSIS — I12.9 HYPERTENSIVE CHRONIC KIDNEY DISEASE WITH STAGE 1 THROUGH STAGE 4 CHRONIC KIDNEY DISEASE, OR UNSPECIFIED CHRONIC KIDNEY DISEASE: ICD-10-CM

## 2024-01-25 DIAGNOSIS — E11.65 TYPE 2 DIABETES MELLITUS WITH HYPERGLYCEMIA: ICD-10-CM

## 2024-01-25 DIAGNOSIS — M10.9 GOUT, UNSPECIFIED: ICD-10-CM

## 2024-01-29 ENCOUNTER — INPATIENT (INPATIENT)
Facility: HOSPITAL | Age: 79
LOS: 1 days | Discharge: HOME | DRG: 305 | End: 2024-01-31
Attending: STUDENT IN AN ORGANIZED HEALTH CARE EDUCATION/TRAINING PROGRAM | Admitting: STUDENT IN AN ORGANIZED HEALTH CARE EDUCATION/TRAINING PROGRAM
Payer: COMMERCIAL

## 2024-01-29 ENCOUNTER — APPOINTMENT (OUTPATIENT)
Dept: INTERNAL MEDICINE | Facility: CLINIC | Age: 79
End: 2024-01-29
Payer: MEDICARE

## 2024-01-29 VITALS
HEIGHT: 65 IN | OXYGEN SATURATION: 96 % | DIASTOLIC BLOOD PRESSURE: 87 MMHG | TEMPERATURE: 98 F | SYSTOLIC BLOOD PRESSURE: 236 MMHG | HEART RATE: 77 BPM | RESPIRATION RATE: 18 BRPM

## 2024-01-29 VITALS
SYSTOLIC BLOOD PRESSURE: 213 MMHG | RESPIRATION RATE: 14 BRPM | HEART RATE: 86 BPM | DIASTOLIC BLOOD PRESSURE: 102 MMHG | OXYGEN SATURATION: 99 %

## 2024-01-29 VITALS — DIASTOLIC BLOOD PRESSURE: 96 MMHG | SYSTOLIC BLOOD PRESSURE: 230 MMHG

## 2024-01-29 DIAGNOSIS — I1A.0 RESISTANT HYPERTENSION: ICD-10-CM

## 2024-01-29 DIAGNOSIS — E11.51 TYPE 2 DIABETES MELLITUS WITH DIABETIC PERIPHERAL ANGIOPATHY WITHOUT GANGRENE: ICD-10-CM

## 2024-01-29 DIAGNOSIS — N18.30 CHRONIC KIDNEY DISEASE, STAGE 3 UNSPECIFIED: ICD-10-CM

## 2024-01-29 DIAGNOSIS — Z92.29 PERSONAL HISTORY OF OTHER DRUG THERAPY: ICD-10-CM

## 2024-01-29 DIAGNOSIS — E11.9 TYPE 2 DIABETES MELLITUS WITHOUT COMPLICATIONS: ICD-10-CM

## 2024-01-29 DIAGNOSIS — N17.9 ACUTE KIDNEY FAILURE, UNSPECIFIED: ICD-10-CM

## 2024-01-29 DIAGNOSIS — I16.0 HYPERTENSIVE URGENCY: ICD-10-CM

## 2024-01-29 DIAGNOSIS — M10.9 GOUT, UNSPECIFIED: ICD-10-CM

## 2024-01-29 DIAGNOSIS — E11.22 TYPE 2 DIABETES MELLITUS WITH DIABETIC CHRONIC KIDNEY DISEASE: ICD-10-CM

## 2024-01-29 DIAGNOSIS — Z41.8 ENCOUNTER FOR OTHER PROCEDURES FOR PURPOSES OTHER THAN REMEDYING HEALTH STATE: ICD-10-CM

## 2024-01-29 DIAGNOSIS — Z79.84 LONG TERM (CURRENT) USE OF ORAL HYPOGLYCEMIC DRUGS: ICD-10-CM

## 2024-01-29 DIAGNOSIS — E83.52 HYPERCALCEMIA: ICD-10-CM

## 2024-01-29 DIAGNOSIS — Z01.810 ENCOUNTER FOR PREPROCEDURAL CARDIOVASCULAR EXAMINATION: ICD-10-CM

## 2024-01-29 DIAGNOSIS — Z79.82 LONG TERM (CURRENT) USE OF ASPIRIN: ICD-10-CM

## 2024-01-29 DIAGNOSIS — Z95.820 PERIPHERAL VASCULAR ANGIOPLASTY STATUS WITH IMPLANTS AND GRAFTS: ICD-10-CM

## 2024-01-29 DIAGNOSIS — E26.9 HYPERALDOSTERONISM, UNSPECIFIED: ICD-10-CM

## 2024-01-29 DIAGNOSIS — I16.1 HYPERTENSIVE EMERGENCY: ICD-10-CM

## 2024-01-29 DIAGNOSIS — I65.29 OCCLUSION AND STENOSIS OF UNSPECIFIED CAROTID ARTERY: Chronic | ICD-10-CM

## 2024-01-29 DIAGNOSIS — E78.5 HYPERLIPIDEMIA, UNSPECIFIED: ICD-10-CM

## 2024-01-29 DIAGNOSIS — D25.9 LEIOMYOMA OF UTERUS, UNSPECIFIED: Chronic | ICD-10-CM

## 2024-01-29 DIAGNOSIS — Z29.9 ENCOUNTER FOR PROPHYLACTIC MEASURES, UNSPECIFIED: ICD-10-CM

## 2024-01-29 DIAGNOSIS — Z79.4 LONG TERM (CURRENT) USE OF INSULIN: ICD-10-CM

## 2024-01-29 DIAGNOSIS — Z90.710 ACQUIRED ABSENCE OF BOTH CERVIX AND UTERUS: Chronic | ICD-10-CM

## 2024-01-29 DIAGNOSIS — I35.0 NONRHEUMATIC AORTIC (VALVE) STENOSIS: ICD-10-CM

## 2024-01-29 DIAGNOSIS — I12.9 HYPERTENSIVE CHRONIC KIDNEY DISEASE WITH STAGE 1 THROUGH STAGE 4 CHRONIC KIDNEY DISEASE, OR UNSPECIFIED CHRONIC KIDNEY DISEASE: ICD-10-CM

## 2024-01-29 LAB
ANION GAP SERPL CALC-SCNC: 8 MMOL/L — SIGNIFICANT CHANGE UP (ref 5–17)
BASOPHILS # BLD AUTO: 0.1 K/UL — SIGNIFICANT CHANGE UP (ref 0–0.2)
BASOPHILS NFR BLD AUTO: 1.1 % — SIGNIFICANT CHANGE UP (ref 0–2)
BUN SERPL-MCNC: 30 MG/DL — HIGH (ref 7–23)
CALCIUM SERPL-MCNC: 11 MG/DL — HIGH (ref 8.4–10.5)
CHLORIDE SERPL-SCNC: 105 MMOL/L — SIGNIFICANT CHANGE UP (ref 96–108)
CK MB CFR SERPL CALC: 3.2 NG/ML — SIGNIFICANT CHANGE UP (ref 0–6.7)
CK SERPL-CCNC: 133 U/L — SIGNIFICANT CHANGE UP (ref 25–170)
CO2 SERPL-SCNC: 28 MMOL/L — SIGNIFICANT CHANGE UP (ref 22–31)
CREAT SERPL-MCNC: 1.27 MG/DL — SIGNIFICANT CHANGE UP (ref 0.5–1.3)
EGFR: 43 ML/MIN/1.73M2 — LOW
EOSINOPHIL # BLD AUTO: 0.27 K/UL — SIGNIFICANT CHANGE UP (ref 0–0.5)
EOSINOPHIL NFR BLD AUTO: 2.9 % — SIGNIFICANT CHANGE UP (ref 0–6)
GLUCOSE BLDC GLUCOMTR-MCNC: 368 MG/DL — HIGH (ref 70–99)
GLUCOSE SERPL-MCNC: 104 MG/DL — HIGH (ref 70–99)
HCT VFR BLD CALC: 35.4 % — SIGNIFICANT CHANGE UP (ref 34.5–45)
HGB BLD-MCNC: 12.3 G/DL — SIGNIFICANT CHANGE UP (ref 11.5–15.5)
IMM GRANULOCYTES NFR BLD AUTO: 0.4 % — SIGNIFICANT CHANGE UP (ref 0–0.9)
LYMPHOCYTES # BLD AUTO: 2.74 K/UL — SIGNIFICANT CHANGE UP (ref 1–3.3)
LYMPHOCYTES # BLD AUTO: 29.8 % — SIGNIFICANT CHANGE UP (ref 13–44)
MCHC RBC-ENTMCNC: 26.5 PG — LOW (ref 27–34)
MCHC RBC-ENTMCNC: 34.7 GM/DL — SIGNIFICANT CHANGE UP (ref 32–36)
MCV RBC AUTO: 76.3 FL — LOW (ref 80–100)
MONOCYTES # BLD AUTO: 0.82 K/UL — SIGNIFICANT CHANGE UP (ref 0–0.9)
MONOCYTES NFR BLD AUTO: 8.9 % — SIGNIFICANT CHANGE UP (ref 2–14)
NEUTROPHILS # BLD AUTO: 5.22 K/UL — SIGNIFICANT CHANGE UP (ref 1.8–7.4)
NEUTROPHILS NFR BLD AUTO: 56.9 % — SIGNIFICANT CHANGE UP (ref 43–77)
NRBC # BLD: 0 /100 WBCS — SIGNIFICANT CHANGE UP (ref 0–0)
PLATELET # BLD AUTO: 282 K/UL — SIGNIFICANT CHANGE UP (ref 150–400)
POTASSIUM SERPL-MCNC: 5 MMOL/L — SIGNIFICANT CHANGE UP (ref 3.5–5.3)
POTASSIUM SERPL-SCNC: 5 MMOL/L — SIGNIFICANT CHANGE UP (ref 3.5–5.3)
RBC # BLD: 4.64 M/UL — SIGNIFICANT CHANGE UP (ref 3.8–5.2)
RBC # FLD: 15.2 % — HIGH (ref 10.3–14.5)
SODIUM SERPL-SCNC: 141 MMOL/L — SIGNIFICANT CHANGE UP (ref 135–145)
TROPONIN T, HIGH SENSITIVITY RESULT: 25 NG/L — SIGNIFICANT CHANGE UP (ref 0–51)
WBC # BLD: 9.19 K/UL — SIGNIFICANT CHANGE UP (ref 3.8–10.5)
WBC # FLD AUTO: 9.19 K/UL — SIGNIFICANT CHANGE UP (ref 3.8–10.5)

## 2024-01-29 PROCEDURE — 93010 ELECTROCARDIOGRAM REPORT: CPT

## 2024-01-29 PROCEDURE — 99496 TRANSJ CARE MGMT HIGH F2F 7D: CPT | Mod: 25

## 2024-01-29 PROCEDURE — 99223 1ST HOSP IP/OBS HIGH 75: CPT | Mod: GC

## 2024-01-29 PROCEDURE — 99291 CRITICAL CARE FIRST HOUR: CPT

## 2024-01-29 PROCEDURE — G2211 COMPLEX E/M VISIT ADD ON: CPT | Mod: NC,1L

## 2024-01-29 RX ORDER — LOSARTAN POTASSIUM 100 MG/1
100 TABLET, FILM COATED ORAL EVERY 24 HOURS
Refills: 0 | Status: DISCONTINUED | OUTPATIENT
Start: 2024-01-29 | End: 2024-01-29

## 2024-01-29 RX ORDER — HYDRALAZINE HCL 50 MG
15 TABLET ORAL ONCE
Refills: 0 | Status: COMPLETED | OUTPATIENT
Start: 2024-01-29 | End: 2024-01-29

## 2024-01-29 RX ORDER — AMLODIPINE BESYLATE 2.5 MG/1
10 TABLET ORAL EVERY 24 HOURS
Refills: 0 | Status: DISCONTINUED | OUTPATIENT
Start: 2024-01-29 | End: 2024-01-31

## 2024-01-29 RX ORDER — LOSARTAN POTASSIUM 100 MG/1
100 TABLET, FILM COATED ORAL EVERY 24 HOURS
Refills: 0 | Status: DISCONTINUED | OUTPATIENT
Start: 2024-01-30 | End: 2024-01-30

## 2024-01-29 RX ORDER — HEPARIN SODIUM 5000 [USP'U]/ML
5000 INJECTION INTRAVENOUS; SUBCUTANEOUS EVERY 8 HOURS
Refills: 0 | Status: DISCONTINUED | OUTPATIENT
Start: 2024-01-29 | End: 2024-01-31

## 2024-01-29 RX ORDER — SODIUM CHLORIDE 9 MG/ML
1000 INJECTION, SOLUTION INTRAVENOUS
Refills: 0 | Status: DISCONTINUED | OUTPATIENT
Start: 2024-01-29 | End: 2024-01-31

## 2024-01-29 RX ORDER — METOPROLOL TARTRATE 50 MG
100 TABLET ORAL DAILY
Refills: 0 | Status: DISCONTINUED | OUTPATIENT
Start: 2024-01-30 | End: 2024-01-31

## 2024-01-29 RX ORDER — ALLOPURINOL 300 MG
100 TABLET ORAL DAILY
Refills: 0 | Status: DISCONTINUED | OUTPATIENT
Start: 2024-01-29 | End: 2024-01-30

## 2024-01-29 RX ORDER — FENOFIBRATE,MICRONIZED 130 MG
145 CAPSULE ORAL DAILY
Refills: 0 | Status: DISCONTINUED | OUTPATIENT
Start: 2024-01-29 | End: 2024-01-31

## 2024-01-29 RX ORDER — SODIUM POLYSTYRENE SULFONATE 4.1 MEQ/G
POWDER, FOR SUSPENSION ORAL; RECTAL
Refills: 0 | Status: DISCONTINUED | COMMUNITY
End: 2024-01-29

## 2024-01-29 RX ORDER — DOXAZOSIN MESYLATE 4 MG
8 TABLET ORAL AT BEDTIME
Refills: 0 | Status: DISCONTINUED | OUTPATIENT
Start: 2024-01-29 | End: 2024-01-31

## 2024-01-29 RX ORDER — DEXTROSE 50 % IN WATER 50 %
25 SYRINGE (ML) INTRAVENOUS ONCE
Refills: 0 | Status: DISCONTINUED | OUTPATIENT
Start: 2024-01-29 | End: 2024-01-31

## 2024-01-29 RX ORDER — GLUCAGON INJECTION, SOLUTION 0.5 MG/.1ML
1 INJECTION, SOLUTION SUBCUTANEOUS ONCE
Refills: 0 | Status: DISCONTINUED | OUTPATIENT
Start: 2024-01-29 | End: 2024-01-31

## 2024-01-29 RX ORDER — INSULIN LISPRO 100/ML
VIAL (ML) SUBCUTANEOUS
Refills: 0 | Status: DISCONTINUED | OUTPATIENT
Start: 2024-01-29 | End: 2024-01-31

## 2024-01-29 RX ORDER — INSULIN GLARGINE 100 [IU]/ML
20 INJECTION, SOLUTION SUBCUTANEOUS AT BEDTIME
Refills: 0 | Status: DISCONTINUED | OUTPATIENT
Start: 2024-01-29 | End: 2024-01-30

## 2024-01-29 RX ORDER — HYDRALAZINE HCL 50 MG
50 TABLET ORAL ONCE
Refills: 0 | Status: DISCONTINUED | OUTPATIENT
Start: 2024-01-29 | End: 2024-01-29

## 2024-01-29 RX ORDER — HYDROCHLOROTHIAZIDE 12.5 MG/1
12.5 TABLET ORAL DAILY
Qty: 30 | Refills: 0 | Status: DISCONTINUED | COMMUNITY
Start: 2023-11-20 | End: 2024-01-29

## 2024-01-29 RX ORDER — DAPAGLIFLOZIN 10 MG/1
10 TABLET, FILM COATED ORAL DAILY
Qty: 90 | Refills: 3 | Status: DISCONTINUED | COMMUNITY
Start: 2023-09-29 | End: 2024-01-29

## 2024-01-29 RX ORDER — CLONIDINE 0.2 MG/24H
0.2 PATCH, EXTENDED RELEASE TRANSDERMAL WEEKLY
Qty: 1 | Refills: 1 | Status: DISCONTINUED | COMMUNITY
Start: 2023-11-20 | End: 2024-01-29

## 2024-01-29 RX ORDER — METOPROLOL SUCCINATE 50 MG/1
50 TABLET, EXTENDED RELEASE ORAL DAILY
Refills: 0 | Status: DISCONTINUED | COMMUNITY
Start: 2023-11-20 | End: 2024-01-29

## 2024-01-29 RX ORDER — ASPIRIN/CALCIUM CARB/MAGNESIUM 324 MG
81 TABLET ORAL DAILY
Refills: 0 | Status: DISCONTINUED | OUTPATIENT
Start: 2024-01-29 | End: 2024-01-31

## 2024-01-29 RX ORDER — COLCHICINE 0.6 MG/1
0.6 TABLET ORAL EVERY OTHER DAY
Qty: 3 | Refills: 0 | Status: DISCONTINUED | COMMUNITY
Start: 2023-11-20 | End: 2024-01-29

## 2024-01-29 RX ORDER — DEXTROSE 50 % IN WATER 50 %
12.5 SYRINGE (ML) INTRAVENOUS ONCE
Refills: 0 | Status: DISCONTINUED | OUTPATIENT
Start: 2024-01-29 | End: 2024-01-31

## 2024-01-29 RX ORDER — HYDRALAZINE HCL 50 MG
10 TABLET ORAL ONCE
Refills: 0 | Status: COMPLETED | OUTPATIENT
Start: 2024-01-29 | End: 2024-01-29

## 2024-01-29 RX ORDER — DEXTROSE 50 % IN WATER 50 %
15 SYRINGE (ML) INTRAVENOUS ONCE
Refills: 0 | Status: DISCONTINUED | OUTPATIENT
Start: 2024-01-29 | End: 2024-01-31

## 2024-01-29 RX ORDER — OXYCODONE 5 MG/1
5 TABLET ORAL
Qty: 5 | Refills: 0 | Status: DISCONTINUED | COMMUNITY
Start: 2023-11-20 | End: 2024-01-29

## 2024-01-29 RX ORDER — LABETALOL HCL 100 MG
10 TABLET ORAL ONCE
Refills: 0 | Status: COMPLETED | OUTPATIENT
Start: 2024-01-29 | End: 2024-01-29

## 2024-01-29 RX ORDER — LABETALOL HCL 100 MG
100 TABLET ORAL ONCE
Refills: 0 | Status: COMPLETED | OUTPATIENT
Start: 2024-01-29 | End: 2024-01-29

## 2024-01-29 RX ADMIN — Medication 0.2 MILLIGRAM(S): at 19:06

## 2024-01-29 RX ADMIN — Medication 100 MILLIGRAM(S): at 15:41

## 2024-01-29 RX ADMIN — Medication 8 MILLIGRAM(S): at 21:27

## 2024-01-29 RX ADMIN — INSULIN GLARGINE 20 UNIT(S): 100 INJECTION, SOLUTION SUBCUTANEOUS at 22:45

## 2024-01-29 RX ADMIN — Medication 15 MILLIGRAM(S): at 14:19

## 2024-01-29 RX ADMIN — Medication 10 MILLIGRAM(S): at 15:41

## 2024-01-29 RX ADMIN — Medication 10 MILLIGRAM(S): at 13:12

## 2024-01-29 RX ADMIN — Medication 5: at 22:45

## 2024-01-29 RX ADMIN — HEPARIN SODIUM 5000 UNIT(S): 5000 INJECTION INTRAVENOUS; SUBCUTANEOUS at 21:27

## 2024-01-29 RX ADMIN — AMLODIPINE BESYLATE 10 MILLIGRAM(S): 2.5 TABLET ORAL at 19:06

## 2024-01-29 NOTE — REVIEW OF SYSTEMS
[Fatigue] : no fatigue [Vision Problems] : no vision problems [Chest Pain] : no chest pain [Palpitations] : no palpitations [Lower Ext Edema] : no lower extremity edema [Shortness Of Breath] : no shortness of breath [Abdominal Pain] : no abdominal pain [Constipation] : no constipation [Diarrhea] : diarrhea [Dysuria] : no dysuria [Back Pain] : no back pain [Headache] : no headache [Dizziness] : no dizziness

## 2024-01-29 NOTE — H&P ADULT - PROBLEM SELECTOR PLAN 4
known hx of stage 3 ckd, followed by nephrology Dr. Pearson. Baseline Cr 1.29 today at 1.27   - Continue to trend Cr  - Avoid nephrotoxic agents  - Adjust medication dosages for level of renal function.

## 2024-01-29 NOTE — ED ADULT NURSE REASSESSMENT NOTE - NS ED NURSE REASSESS COMMENT FT1
inpatient team contacted about pt recent V.S (pt already medicated as per MD orders) and as per inpatient team- pt is still clear to go to assigned floor. Pt is noted to be aox3, able to maintain airway, having nonlabored breathing, and in no acute distress.

## 2024-01-29 NOTE — H&P ADULT - NSHPPHYSICALEXAM_GEN_ALL_CORE
PHYSICAL EXAM:    GENERAL: Comfortable, no acute distress   HEAD:  Normocephalic, atraumatic  EYES: EOMI, PERRLA  HEENT: Moist mucous membranes  NECK: Supple, No JVD  NERVOUS SYSTEM:  Alert & Oriented X3, Motor Strength 5/5 B/L upper and lower extremities  CHEST/LUNG: Clear to auscultation bilaterally  HEART: Regular rate and rhythm  ABDOMEN: Soft, non tender, Nondistended, Bowel sounds present  GENITOURINARY: Voiding, no palpable bladder  EXTREMITIES:   No clubbing, cyanosis, or edema  MUSCULOSKELETAL- No muscle tenderness, no joint tenderness  SKIN-no rash PHYSICAL EXAM:    GENERAL: Comfortable, no acute distress   HEAD:  Normocephalic, atraumatic  EYES: EOMI, PERRLA  HEENT: Moist mucous membranes  NECK: Supple, No JVD  NERVOUS SYSTEM:  Alert & Oriented X3, Motor Strength 5/5 B/L upper and lower extremities  CHEST/LUNG: Clear to auscultation bilaterally  HEART: Regular rate and rhythm  ABDOMEN: Soft, non tender, Nondistended, Bowel sounds present  GENITOURINARY: Voiding, no palpable bladder  EXTREMITIES:   1+ b/l LE pitting edema   MUSCULOSKELETAL- No muscle tenderness, no joint tenderness  SKIN-no rash

## 2024-01-29 NOTE — ED PROVIDER NOTE - CLINICAL SUMMARY MEDICAL DECISION MAKING FREE TEXT BOX
78-year-old with  known history of  difficult to control hypertension  on multiple medications, here with elevated blood pressure,  patient completely asymptomatic,  EKG reassuringly asymptomatic and exam reassuring will check labs and attempt to control blood pressure, dispo pending workup and reevaluation, 78-year-old with  known history of  difficult to control hypertension  on multiple medications, here with elevated blood pressure,  patient completely asymptomatic,  EKG reassuringly asymptomatic and exam reassuring will check labs and attempt to control blood pressure, dispo pending workup and reevaluation,    Patient needed multiple rounds of medications to control blood pressure, patient remains asymptomatic and blood pressure has now improved, had rebound tachycardia after hydralazine, switch back to labetalol with success.  Plan to admit to medicine for blood pressure monitoring and medication reevaluation.

## 2024-01-29 NOTE — H&P ADULT - PROBLEM SELECTOR PLAN 3
Known history of DM2. On home farxiga 10 mg qd, lantus 30 u qhs   - mISS  - lantus 20 u qhs  - monitor FS  - consistent carb diet.

## 2024-01-29 NOTE — H&P ADULT - ASSESSMENT
79 y/o F w/ PMHx of resistant HTN (follows with nephrologist Dr. Pearson at [place]), HLD, PAD admitted for management of hypertensive urgency.

## 2024-01-29 NOTE — PATIENT PROFILE ADULT - FUNCTIONAL ASSESSMENT - DAILY ACTIVITY 2.
[Postpartum Follow Up] : postpartum follow up [Delivery Date: ___] : on [unfilled] [Vaginal Delivery] : vaginal delivery [Delivery Date Was ___] : delivery date was [unfilled] [Rhogam] : Rhogam administered [Rubella Vaccine] : Rubella vaccine administered [Pertussis Vaccine] : Pertussis vaccine administered [Girl] : baby is a girl [___ Lbs] : [unfilled] lbs [___ Oz] : [unfilled] oz [NICU Admission] : NICU admission [Living at Home] : is currently living at home [Bottle Feeding] : bottle feeding [] : delivered by vaginal delivery [Female] : Delivery History: baby girl [Wt. ___] : weighing [unfilled] [Complications:___] : no complications [BTL] : no tubal ligation [Breastfeeding] : not currently nursing [Resumed Menses] : has not resumed her menses [Resumed [place]] : has not resumed intercourse [Breast Pain] : no breast pain [Back to Normal] : is back to normal in size [None] : no vaginal bleeding [Normal] : the vagina was normal [Healing Well] : is healing well [Examination Of The Breasts] : breasts are normal [Soft] : soft [Tender] : non tender [Distended] : not distended [Doing Well] : is doing well [de-identified] : SWALLOWED fliuds 4 = No assist / stand by assistance

## 2024-01-29 NOTE — H&P ADULT - PROBLEM SELECTOR PLAN 5
F: none  E: replete as needed  N: consistent carb  DVT ppx: heparin subq  Dispo: [facility]

## 2024-01-29 NOTE — H&P ADULT - NSHPLABSRESULTS_GEN_ALL_CORE
.  LABS:                         12.3   9.19  )-----------( 282      ( 29 Jan 2024 12:42 )             35.4     01-29    141  |  105  |  30<H>  ----------------------------<  104<H>  5.0   |  28  |  1.27    Ca    11.0<H>      29 Jan 2024 12:42        Urinalysis Basic - ( 29 Jan 2024 12:42 )    Color: x / Appearance: x / SG: x / pH: x  Gluc: 104 mg/dL / Ketone: x  / Bili: x / Urobili: x   Blood: x / Protein: x / Nitrite: x   Leuk Esterase: x / RBC: x / WBC x   Sq Epi: x / Non Sq Epi: x / Bacteria: x            RADIOLOGY, EKG & ADDITIONAL TESTS: Reviewed.

## 2024-01-29 NOTE — PHYSICAL EXAM
[Normal Sclera/Conjunctiva] : normal sclera/conjunctiva [EOMI] : extraocular movements intact [Normal Rate] : normal rate  [Regular Rhythm] : with a regular rhythm [Normal S1, S2] : normal S1 and S2 [Pedal Pulses Present] : the pedal pulses are present [No Edema] : there was no peripheral edema [Coordination Grossly Intact] : coordination grossly intact [No Focal Deficits] : no focal deficits [Normal Gait] : normal gait [Normal] : affect was normal and insight and judgment were intact [de-identified] : overweight adult female well-appearing and in NAD [de-identified] : systolic crescendo-decrescendo murmur at R 2nd ICS with radiation to b/l carotids [de-identified] : b/l carotid bruits [de-identified] : linear vertical well-healed scar on R side of neck from endarterectomy

## 2024-01-29 NOTE — ED ADULT TRIAGE NOTE - CHIEF COMPLAINT QUOTE
Pt BIBEMS from PCP office for high blood pressure. hx of HTN on metoprolol, losartan, amlodipine and clonidine. received 10mg IV labetolol by EMS prior to arrival. denies cp, headache , blurred vision, dizziness.

## 2024-01-29 NOTE — H&P ADULT - PROBLEM SELECTOR PLAN 1
Pt w/ h/o of resistant HTN, followed by nephrology and with extensive outpatient work-up. Pheo r/o; urine metanephrine negative. Hyperaldosteronism r/o as Renin/Aldosterone ratio 5.84.   Has not gotten renal artery ultrasound at this time  no complaints and is comfortable at this itme, never had symptoms  unclear if complaint - endorses at times however per ed provider endorsed being noncomplaint  hctz recently stopped with hypercalcemia  home meds; clonidine 0.2 mg q12, Toprol 100 mg qd, amlodipine 10 mg qd, Losartan 100 mg qd, terazosin 10 mg qd.  - s/p hydral and labetalol IV & PO  - start amlodipine 10 mg tonight, losartan 100 mg & toprol 100 mg in AM, clonidine 0.2 mg q12 tonight   - monitor for s/sx's of hypertensive urgency

## 2024-01-29 NOTE — ED ADULT NURSE NOTE - OBJECTIVE STATEMENT
patient arrived to the ER for elevated BP. Pt at this moment denies any medical complaints or symptoms. Pt is noted to be aox,3 able to maintain airway, having nonlabored breathing, no retractions noted, non diaphoretic and able to talk in clear full sentences. EMS placed 18g iv on the right arm. patient arrived to the ER for elevated BP. Pt at this moment denies any medical complaints or symptoms. pt states taking all her BP medications prior to arriving to the ER. Pt is noted to be aox,3 able to maintain airway, having nonlabored breathing, no retractions noted, non diaphoretic and able to talk in clear full sentences. EMS placed 18g iv on the right arm.

## 2024-01-29 NOTE — HISTORY OF PRESENT ILLNESS
[Post-hospitalization from ___ Hospital] : Post-hospitalization from [unfilled] Hospital [FreeTextEntry2] : 79yo F PMH resistant HTN, HLD, T2DM, diabetic retinopathy, HFpEF, CKD (Cr 1.8-3.0), carotid stenosis (s/p R endarterectomy), gout, anemia, RLE neuropathy here for post-discharge appt. Sent to [facility] from PCP office for BP 200s/100s asymptomatic. Medications were adjusted, renal US was ordered but not performed. BP regimen upon dc: Metoprolol succinate 100 mg qd, Clonidine 0.2 mg BID, Losartan 100 mg qd, Terazosin 10 mg qhs, Amlodipine 10 mg qd. Pt has felt well since discharge and reports compliance with all of her medications. However, at today's visit BP remains elevated at 213/102 on LUE, 230/96 on RUE and 215/100 on LUE measured manually. She is asymptomatic at this time and denies headache, lightheadedness, vision changes, neck pain, chest pain, palpitations, abdominal pain, changes to urinary habits. Pt sees a nephrologist Dr. Gerardo Pearson but she states he has never discussed the cause of her resistant HTN. She has an appt scheduled with him on 1/31. She states she is physically active - just got a stationary bike and has started using it, she walks her dog (Chyna Manning named Jesusmo) twice a day. 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 Given repeatedly and persistently elevated BP, despite lack of symptoms, decision made to send pt to the hospital for likely tele admission to safely lower BP. Would benefit from further inpatient work up including echocardiogram, renal US and renal artery doppler, and outpatient ambulatory BP monitoring. May benefit from medication adjustments including switching from Metoprolol to Carvedilol, from Amlodipine to Nifedipine (qd or BID), and from Losartan to Entresto for HFpEF.

## 2024-01-29 NOTE — ED PROVIDER NOTE - OBJECTIVE STATEMENT
78-year-old, with history of diabetes, hyperlipidemia, hypertension, hypercalcemia, carotid stenosis, fibroids, hysterectomy,  Patient sent in by her primary care doctor today after found to have blood pressure elevated, blood pressure on arrival here 236/87, patient reports she is completely asymptomatic, does not have headache, no dizziness, no vision changes, no chest pain, has been feeling well lately and reports she does not take her blood pressure at home, does not eat excessive salt, ate lima beans and chicken with–last night, took her blood pressure medications this morning, patient is on multiple blood pressure medications including amlodipine, clonidine, losartan, Toprol

## 2024-01-29 NOTE — ED ADULT TRIAGE NOTE - GLASGOW COMA SCALE: BEST MOTOR RESPONSE, MLM
Last office visit 10/05/2021. Future appointment 10/06/2022. Okay to refill per protocol.   (M6) obeys commands

## 2024-01-29 NOTE — END OF VISIT
[] : Resident [FreeTextEntry3] : 78F post hospital dc coming in today for f/u, BP meds were adjusted during last hospitaliation, however today presents with /100s checked manually and electronically by Dr. Langford and myself. Pt reports compliance to medications, took them around 7am this morning.  Send to ER for closer titratinon of meds as suggested above.

## 2024-01-29 NOTE — H&P ADULT - HISTORY OF PRESENT ILLNESS
77 y/o F w/ PMHx of resistant HTN (follows with nephrologist Dr. Pearson at [place]), HLD, IDDM type 2, CKD stage 3, PAD s/p R endarterectomy with stenting who presents from PCP/[facility] due to elevated BP in 230s systolically however asymptomatic. If note had previous admission 10 days ago with same issue. She denies any HA, vision changes, CP, SOB, n/v, abdominal pain, numbness or tingling. She states she has not been taking her blood pressure medications after last admission but was prescribed: clonidine 0.2 mg q12,  Toprol 100 mg qd, amlodipine 10 mg qd, Losartan 100 mg qd, terazosin 10 mg qd. Of note, pt with extensive outpatient work-up, pheo and hyperaldosteronism r/o and was recently taken off of hctz 2/2 hypercalcemia.    In the ED:   Vitals: Temp 98 HR 77 /87 O2 Sat 98% on RA  Labs: WBC 9.1 Hg 12.3 Plt 282 Na 141 K 5 Cl 105 BUN 30 Cr1.27 Calcium 11Trop T 25  CKMB 3.2   Imaging: none  Intervention: hydralazine 10 IV, 15, labetolol 100 mg, 10 mg    77 y/o F w/ PMHx of resistant HTN (follows with nephrologist Dr. Pearson at [place]), HLD, IDDM type 2, CKD stage 3, PAD s/p R endarterectomy with stenting who presents from PCP/[facility] due to elevated BP in 230s systolically however asymptomatic. If note had previous admission 10 days ago with same issue. She denies any HA, vision changes, CP, SOB, n/v, abdominal pain, numbness or tingling. She states she has been taking her blood pressure medications after last admission as was prescribed: clonidine 0.2 mg q12,  Toprol 100 mg qd, amlodipine 10 mg qd, Losartan 100 mg qd, terazosin 10 mg qd. Of note, pt with extensive outpatient work-up, pheo and hyperaldosteronism r/o and was recently taken off of hctz 2/2 hypercalcemia.  She states she has some light leg swelling which has been going on for years but no other issues and wears compression stockings sometimes.     In the ED:   Vitals: Temp 98 HR 77 /87 O2 Sat 98% on RA  Labs: WBC 9.1 Hg 12.3 Plt 282 Na 141 K 5 Cl 105 BUN 30 Cr1.27 Calcium 11Trop T 25  CKMB 3.2   Imaging: none  Intervention: hydralazine 10 IV, 15, labetolol 100 mg, 10 mg

## 2024-01-29 NOTE — H&P ADULT - ATTENDING COMMENTS
#HTN urgency: hx of resistant/uncontrolled htn, recent admission, p/w elevated BP to 200s. No signs/symptoms of HTN emergency. Reports compliance to all BP meds. s/p secondary htn w/up, pending renal US. s/p hydral/labetalol in ED w/ improvement. Restart home meds, monitor BP. Avoid over correction. Consider adding hydral to regimen. Renal consult in am

## 2024-01-30 LAB
ANION GAP SERPL CALC-SCNC: 11 MMOL/L — SIGNIFICANT CHANGE UP (ref 5–17)
ANION GAP SERPL CALC-SCNC: 9 MMOL/L — SIGNIFICANT CHANGE UP (ref 5–17)
APPEARANCE UR: ABNORMAL
BACTERIA # UR AUTO: ABNORMAL /HPF
BILIRUB UR-MCNC: NEGATIVE — SIGNIFICANT CHANGE UP
BUN SERPL-MCNC: 39 MG/DL — HIGH (ref 7–23)
BUN SERPL-MCNC: 44 MG/DL — HIGH (ref 7–23)
CALCIUM SERPL-MCNC: 10.2 MG/DL — SIGNIFICANT CHANGE UP (ref 8.4–10.5)
CALCIUM SERPL-MCNC: 10.3 MG/DL — SIGNIFICANT CHANGE UP (ref 8.4–10.5)
CAST: 41 /LPF — HIGH (ref 0–4)
CHLORIDE SERPL-SCNC: 105 MMOL/L — SIGNIFICANT CHANGE UP (ref 96–108)
CHLORIDE SERPL-SCNC: 99 MMOL/L — SIGNIFICANT CHANGE UP (ref 96–108)
CO2 SERPL-SCNC: 25 MMOL/L — SIGNIFICANT CHANGE UP (ref 22–31)
CO2 SERPL-SCNC: 25 MMOL/L — SIGNIFICANT CHANGE UP (ref 22–31)
COLOR SPEC: YELLOW — SIGNIFICANT CHANGE UP
CREAT ?TM UR-MCNC: 201 MG/DL — SIGNIFICANT CHANGE UP
CREAT SERPL-MCNC: 1.99 MG/DL — HIGH (ref 0.5–1.3)
CREAT SERPL-MCNC: 2.04 MG/DL — HIGH (ref 0.5–1.3)
DIFF PNL FLD: NEGATIVE — SIGNIFICANT CHANGE UP
EGFR: 24 ML/MIN/1.73M2 — LOW
EGFR: 25 ML/MIN/1.73M2 — LOW
FINE GRAN CASTS #/AREA URNS AUTO: PRESENT
GLUCOSE BLDC GLUCOMTR-MCNC: 132 MG/DL — HIGH (ref 70–99)
GLUCOSE BLDC GLUCOMTR-MCNC: 231 MG/DL — HIGH (ref 70–99)
GLUCOSE BLDC GLUCOMTR-MCNC: 240 MG/DL — HIGH (ref 70–99)
GLUCOSE BLDC GLUCOMTR-MCNC: 256 MG/DL — HIGH (ref 70–99)
GLUCOSE SERPL-MCNC: 134 MG/DL — HIGH (ref 70–99)
GLUCOSE SERPL-MCNC: 168 MG/DL — HIGH (ref 70–99)
GLUCOSE UR QL: NEGATIVE MG/DL — SIGNIFICANT CHANGE UP
HCT VFR BLD CALC: 29.6 % — LOW (ref 34.5–45)
HGB BLD-MCNC: 10.6 G/DL — LOW (ref 11.5–15.5)
HYALINE CASTS # UR AUTO: PRESENT
KETONES UR-MCNC: ABNORMAL MG/DL
LEUKOCYTE ESTERASE UR-ACNC: NEGATIVE — SIGNIFICANT CHANGE UP
MAGNESIUM SERPL-MCNC: 1.7 MG/DL — SIGNIFICANT CHANGE UP (ref 1.6–2.6)
MCHC RBC-ENTMCNC: 27.2 PG — SIGNIFICANT CHANGE UP (ref 27–34)
MCHC RBC-ENTMCNC: 35.8 GM/DL — SIGNIFICANT CHANGE UP (ref 32–36)
MCV RBC AUTO: 76.1 FL — LOW (ref 80–100)
METANEPHRINE, PL: 26.9 PG/ML — SIGNIFICANT CHANGE UP (ref 0–88)
NITRITE UR-MCNC: NEGATIVE — SIGNIFICANT CHANGE UP
NORMETANEPHRINE, PL: 199.9 PG/ML — SIGNIFICANT CHANGE UP (ref 0–285.2)
NRBC # BLD: 0 /100 WBCS — SIGNIFICANT CHANGE UP (ref 0–0)
OSMOLALITY UR: 356 MOSM/KG — SIGNIFICANT CHANGE UP (ref 300–900)
PH UR: 5.5 — SIGNIFICANT CHANGE UP (ref 5–8)
PHOSPHATE SERPL-MCNC: 2.8 MG/DL — SIGNIFICANT CHANGE UP (ref 2.5–4.5)
PLATELET # BLD AUTO: 243 K/UL — SIGNIFICANT CHANGE UP (ref 150–400)
POTASSIUM SERPL-MCNC: 4.3 MMOL/L — SIGNIFICANT CHANGE UP (ref 3.5–5.3)
POTASSIUM SERPL-MCNC: 4.6 MMOL/L — SIGNIFICANT CHANGE UP (ref 3.5–5.3)
POTASSIUM SERPL-SCNC: 4.3 MMOL/L — SIGNIFICANT CHANGE UP (ref 3.5–5.3)
POTASSIUM SERPL-SCNC: 4.6 MMOL/L — SIGNIFICANT CHANGE UP (ref 3.5–5.3)
PROT ?TM UR-MCNC: 58 MG/DL — HIGH (ref 0–12)
PROT UR-MCNC: 100 MG/DL
PROT/CREAT UR-RTO: 0.3 RATIO — HIGH (ref 0–0.2)
RBC # BLD: 3.89 M/UL — SIGNIFICANT CHANGE UP (ref 3.8–5.2)
RBC # FLD: 15.4 % — HIGH (ref 10.3–14.5)
RBC CASTS # UR COMP ASSIST: 0 /HPF — SIGNIFICANT CHANGE UP (ref 0–4)
SODIUM SERPL-SCNC: 135 MMOL/L — SIGNIFICANT CHANGE UP (ref 135–145)
SODIUM SERPL-SCNC: 139 MMOL/L — SIGNIFICANT CHANGE UP (ref 135–145)
SODIUM UR-SCNC: 40 MMOL/L — SIGNIFICANT CHANGE UP
SP GR SPEC: 1.01 — SIGNIFICANT CHANGE UP (ref 1–1.03)
SQUAMOUS # UR AUTO: 8 /HPF — HIGH (ref 0–5)
UROBILINOGEN FLD QL: 1 MG/DL — SIGNIFICANT CHANGE UP (ref 0.2–1)
WBC # BLD: 7.6 K/UL — SIGNIFICANT CHANGE UP (ref 3.8–10.5)
WBC # FLD AUTO: 7.6 K/UL — SIGNIFICANT CHANGE UP (ref 3.8–10.5)
WBC UR QL: 3 /HPF — SIGNIFICANT CHANGE UP (ref 0–5)

## 2024-01-30 PROCEDURE — 99233 SBSQ HOSP IP/OBS HIGH 50: CPT | Mod: GC

## 2024-01-30 PROCEDURE — 76775 US EXAM ABDO BACK WALL LIM: CPT | Mod: 26

## 2024-01-30 RX ORDER — SODIUM CHLORIDE 9 MG/ML
1000 INJECTION, SOLUTION INTRAVENOUS
Refills: 0 | Status: DISCONTINUED | OUTPATIENT
Start: 2024-01-30 | End: 2024-01-31

## 2024-01-30 RX ORDER — MAGNESIUM SULFATE 500 MG/ML
2 VIAL (ML) INJECTION ONCE
Refills: 0 | Status: COMPLETED | OUTPATIENT
Start: 2024-01-30 | End: 2024-01-30

## 2024-01-30 RX ORDER — HYDRALAZINE HCL 50 MG
50 TABLET ORAL EVERY 8 HOURS
Refills: 0 | Status: DISCONTINUED | OUTPATIENT
Start: 2024-01-30 | End: 2024-01-31

## 2024-01-30 RX ORDER — MAGNESIUM SULFATE 500 MG/ML
1 VIAL (ML) INJECTION ONCE
Refills: 0 | Status: COMPLETED | OUTPATIENT
Start: 2024-01-30 | End: 2024-01-30

## 2024-01-30 RX ORDER — ALLOPURINOL 300 MG
50 TABLET ORAL
Refills: 0 | Status: DISCONTINUED | OUTPATIENT
Start: 2024-02-01 | End: 2024-01-31

## 2024-01-30 RX ORDER — INSULIN LISPRO 100/ML
6 VIAL (ML) SUBCUTANEOUS
Refills: 0 | Status: DISCONTINUED | OUTPATIENT
Start: 2024-01-30 | End: 2024-01-31

## 2024-01-30 RX ORDER — HYDRALAZINE HCL 50 MG
50 TABLET ORAL THREE TIMES A DAY
Refills: 0 | Status: DISCONTINUED | OUTPATIENT
Start: 2024-01-30 | End: 2024-01-30

## 2024-01-30 RX ORDER — INSULIN GLARGINE 100 [IU]/ML
5 INJECTION, SOLUTION SUBCUTANEOUS AT BEDTIME
Refills: 0 | Status: DISCONTINUED | OUTPATIENT
Start: 2024-01-30 | End: 2024-01-31

## 2024-01-30 RX ADMIN — Medication 2: at 18:16

## 2024-01-30 RX ADMIN — LOSARTAN POTASSIUM 100 MILLIGRAM(S): 100 TABLET, FILM COATED ORAL at 06:12

## 2024-01-30 RX ADMIN — Medication 100 MILLIGRAM(S): at 11:39

## 2024-01-30 RX ADMIN — INSULIN GLARGINE 5 UNIT(S): 100 INJECTION, SOLUTION SUBCUTANEOUS at 22:42

## 2024-01-30 RX ADMIN — Medication 50 MILLIGRAM(S): at 15:28

## 2024-01-30 RX ADMIN — HEPARIN SODIUM 5000 UNIT(S): 5000 INJECTION INTRAVENOUS; SUBCUTANEOUS at 06:12

## 2024-01-30 RX ADMIN — Medication 6 UNIT(S): at 18:17

## 2024-01-30 RX ADMIN — Medication 81 MILLIGRAM(S): at 11:39

## 2024-01-30 RX ADMIN — Medication 0.2 MILLIGRAM(S): at 18:07

## 2024-01-30 RX ADMIN — Medication 25 GRAM(S): at 16:50

## 2024-01-30 RX ADMIN — SODIUM CHLORIDE 999 MILLILITER(S): 9 INJECTION, SOLUTION INTRAVENOUS at 15:28

## 2024-01-30 RX ADMIN — Medication 0.2 MILLIGRAM(S): at 06:11

## 2024-01-30 RX ADMIN — HEPARIN SODIUM 5000 UNIT(S): 5000 INJECTION INTRAVENOUS; SUBCUTANEOUS at 13:14

## 2024-01-30 RX ADMIN — Medication 100 GRAM(S): at 16:11

## 2024-01-30 RX ADMIN — HEPARIN SODIUM 5000 UNIT(S): 5000 INJECTION INTRAVENOUS; SUBCUTANEOUS at 21:49

## 2024-01-30 RX ADMIN — AMLODIPINE BESYLATE 10 MILLIGRAM(S): 2.5 TABLET ORAL at 18:07

## 2024-01-30 RX ADMIN — Medication 145 MILLIGRAM(S): at 11:40

## 2024-01-30 RX ADMIN — Medication 8 MILLIGRAM(S): at 21:47

## 2024-01-30 RX ADMIN — SODIUM CHLORIDE 100 MILLILITER(S): 9 INJECTION, SOLUTION INTRAVENOUS at 16:49

## 2024-01-30 RX ADMIN — Medication 2: at 13:14

## 2024-01-30 RX ADMIN — Medication 3: at 22:43

## 2024-01-30 NOTE — PROGRESS NOTE ADULT - PROBLEM SELECTOR PLAN 4
#CHIQU[place] ON CKD  known hx of stage 3 ckd, followed by nephrology Dr. Pearson. Baseline Cr 1.29 today at 1.27   Cr elevated to 2.0 on 1/30 with fine granulat casts on UA, making urine, no retention. FeNA c/w prerenal.    Plan:  - Continue to trend Cr  - Give 1/2 NS 1L  - Avoid nephrotoxic agents  - Adjust medication dosages for level of renal function.

## 2024-01-30 NOTE — PROGRESS NOTE ADULT - PROBLEM SELECTOR PLAN 3
Known history of DM2. On home farxiga 10 mg qd, lantus 30 u qhs     Plan:  - mISS  - resume discharge dose of lantus 5, lispro 6  - monitor FS  - consistent carb diet.

## 2024-01-30 NOTE — PROGRESS NOTE ADULT - PROBLEM SELECTOR PLAN 1
Pt w/ h/o of resistant HTN, followed by nephrology and with extensive outpatient work-up. Pheo r/o; urine metanephrine negative. Hyperaldosteronism r/o as Renin/Aldosterone ratio 5.84.   Has not gotten renal artery ultrasound at this time  no complaints and is comfortable at this itme, never had symptoms  unclear if complaint - endorses at times however per ed provider endorsed being noncomplaint  hctz recently stopped with hypercalcemia  home meds; clonidine 0.2 mg q12, Toprol 100 mg qd, amlodipine 10 mg qd, Losartan 100 mg qd, terazosin 10 mg qd.  - s/p hydral and labetalol IV & PO in ED    Plan:  - C/w amlodipine 10 mg in PM, toprol 100 mg in AM, clonidine 0.2 mg q12 tonight   - holding losartan 100mg qd in [place]  - start hydral 50 tid  - monitor for s/sx's of hypertensive urgency

## 2024-01-30 NOTE — PROGRESS NOTE ADULT - ATTENDING COMMENTS
#HTN urgency  #white coat syndrome  #pre renal CHIQUITA on CKD   hx of resistant/uncontrolled htn, recent admission, p/w elevated BP to 200s. No signs/symptoms of HTN emergency.   Reports compliance to all BP meds.   s/p secondary htn w/up,   pending renal US.   bs cr 1.4-1.5 -- on admission 1.27 - repeat in am showed 2.04   fu urine lytes -- fena Pre - renal -- will give her ivf and repeat labs   s/p hydral/labetalol in ED w/ improvement.   Restarted on home meds, clonidine, toprol, amlodipine -- will hold losartan for pre-renal chiquita and start hydralazine 50 tid  --monitor BP.   Avoid over correction.   fu renal outpt #HTN emergency -with acute kidney injury   #white coat syndrome  #pre renal CHIQUITA on CKD   hx of resistant/uncontrolled htn, recent admission, p/w elevated BP to 200s. No signs/symptoms of HTN emergency.   Reports compliance to all BP meds.   s/p secondary htn w/up,   pending renal US.   bs cr 1.4-1.5 -- on admission 1.27 - repeat in am showed 2.04   fu urine lytes -- fena Pre - renal -- will give her ivf and repeat labs   s/p hydral/labetalol in ED w/ improvement.   Restarted on home meds, clonidine, toprol, amlodipine -- will hold losartan for pre-renal chiquita and start hydralazine 50 tid  --monitor BP.   Avoid over correction.   fu renal outpt

## 2024-01-30 NOTE — PROGRESS NOTE ADULT - PROBLEM SELECTOR PLAN 5
F: none  E: replete as needed  N: consistent carb  DVT ppx: heparin subq  Dispo: [facility]

## 2024-01-30 NOTE — PROGRESS NOTE ADULT - ASSESSMENT
77 y/o F w/ PMHx of resistant HTN (follows with nephrologist Dr. Pearson at [place]), HLD, PAD admitted for management of hypertensive urgency.

## 2024-01-30 NOTE — PROGRESS NOTE ADULT - SUBJECTIVE AND OBJECTIVE BOX
OVERNIGHT EVENTS:    SUBJECTIVE / INTERVAL HPI: Patient seen and examined at bedside.       PHYSICAL EXAM: INCOMPLETE    General: NAD  HEENT: PERRL, anicteric sclera; MMM  Neck: supple  Cardiovascular: +S1/S2, RRR  Respiratory: CTA B/L; normal wob  Gastrointestinal: soft, NT/ND; +BSx4  Extremities: WWP; no edema, clubbing or cyanosis  Vascular: 2+ radial, DP/PT pulses B/L  Neurological: AAOx3; no focal deficits  Psychiatric: pleasant mood and affect  Dermatologic: no appreciable wounds or damage to the skin    VITAL SIGNS:  Vital Signs Last 24 Hrs  T(C): 36.3 (30 Jan 2024 06:02), Max: 37.1 (29 Jan 2024 19:35)  T(F): 97.4 (30 Jan 2024 06:02), Max: 98.8 (29 Jan 2024 19:35)  HR: 82 (30 Jan 2024 06:02) (77 - 114)  BP: 133/68 (30 Jan 2024 06:02) (133/68 - 249/129)  BP(mean): --  RR: 18 (30 Jan 2024 06:02) (18 - 20)  SpO2: 95% (30 Jan 2024 06:02) (94% - 98%)    Parameters below as of 30 Jan 2024 06:02  Patient On (Oxygen Delivery Method): room air          MEDICATIONS:  MEDICATIONS  (STANDING):  allopurinol 100 milliGRAM(s) Oral daily  amLODIPine   Tablet 10 milliGRAM(s) Oral every 24 hours  aspirin  chewable 81 milliGRAM(s) Oral daily  cloNIDine 0.2 milliGRAM(s) Oral every 12 hours  dextrose 5%. 1000 milliLiter(s) (50 mL/Hr) IV Continuous <Continuous>  dextrose 5%. 1000 milliLiter(s) (100 mL/Hr) IV Continuous <Continuous>  dextrose 50% Injectable 25 Gram(s) IV Push once  dextrose 50% Injectable 25 Gram(s) IV Push once  dextrose 50% Injectable 12.5 Gram(s) IV Push once  doxazosin 8 milliGRAM(s) Oral at bedtime  fenofibrate Tablet 145 milliGRAM(s) Oral daily  glucagon  Injectable 1 milliGRAM(s) IntraMuscular once  heparin   Injectable 5000 Unit(s) SubCutaneous every 8 hours  insulin glargine Injectable (LANTUS) 20 Unit(s) SubCutaneous at bedtime  insulin lispro (ADMELOG) corrective regimen sliding scale   SubCutaneous Before meals and at bedtime  losartan 100 milliGRAM(s) Oral every 24 hours  metoprolol succinate  milliGRAM(s) Oral daily    MEDICATIONS  (PRN):  dextrose Oral Gel 15 Gram(s) Oral once PRN Blood Glucose LESS THAN 70 milliGRAM(s)/deciliter      ALLERGIES:  Allergies    No Known Allergies    Intolerances        LABS:                        10.6   7.60  )-----------( 243      ( 30 Jan 2024 05:30 )             29.6     01-30    139  |  105  |  x   ----------------------------<  134<H>  4.6   |  25  |  x     Ca    10.3      30 Jan 2024 05:30        Urinalysis Basic - ( 30 Jan 2024 05:30 )    Color: x / Appearance: x / SG: x / pH: x  Gluc: 134 mg/dL / Ketone: x  / Bili: x / Urobili: x   Blood: x / Protein: x / Nitrite: x   Leuk Esterase: x / RBC: x / WBC x   Sq Epi: x / Non Sq Epi: x / Bacteria: x      CAPILLARY BLOOD GLUCOSE      POCT Blood Glucose.: 132 mg/dL (30 Jan 2024 09:02)      RADIOLOGY & ADDITIONAL TESTS: Reviewed. OVERNIGHT EVENTS: FAREED. BPs 180s --> 130s.    SUBJECTIVE / INTERVAL HPI: Patient seen and examined at bedside.       PHYSICAL EXAM: INCOMPLETE    General: NAD  HEENT: PERRL, anicteric sclera; MMM  Neck: supple  Cardiovascular: +S1/S2, RRR  Respiratory: CTA B/L; normal wob  Gastrointestinal: soft, NT/ND; +BSx4  Extremities: WWP; no edema, clubbing or cyanosis  Vascular: 2+ radial, DP/PT pulses B/L  Neurological: AAOx3; no focal deficits  Psychiatric: pleasant mood and affect  Dermatologic: no appreciable wounds or damage to the skin    VITAL SIGNS:  Vital Signs Last 24 Hrs  T(C): 36.3 (30 Jan 2024 06:02), Max: 37.1 (29 Jan 2024 19:35)  T(F): 97.4 (30 Jan 2024 06:02), Max: 98.8 (29 Jan 2024 19:35)  HR: 82 (30 Jan 2024 06:02) (77 - 114)  BP: 133/68 (30 Jan 2024 06:02) (133/68 - 249/129)  BP(mean): --  RR: 18 (30 Jan 2024 06:02) (18 - 20)  SpO2: 95% (30 Jan 2024 06:02) (94% - 98%)    Parameters below as of 30 Jan 2024 06:02  Patient On (Oxygen Delivery Method): room air          MEDICATIONS:  MEDICATIONS  (STANDING):  allopurinol 100 milliGRAM(s) Oral daily  amLODIPine   Tablet 10 milliGRAM(s) Oral every 24 hours  aspirin  chewable 81 milliGRAM(s) Oral daily  cloNIDine 0.2 milliGRAM(s) Oral every 12 hours  dextrose 5%. 1000 milliLiter(s) (50 mL/Hr) IV Continuous <Continuous>  dextrose 5%. 1000 milliLiter(s) (100 mL/Hr) IV Continuous <Continuous>  dextrose 50% Injectable 25 Gram(s) IV Push once  dextrose 50% Injectable 25 Gram(s) IV Push once  dextrose 50% Injectable 12.5 Gram(s) IV Push once  doxazosin 8 milliGRAM(s) Oral at bedtime  fenofibrate Tablet 145 milliGRAM(s) Oral daily  glucagon  Injectable 1 milliGRAM(s) IntraMuscular once  heparin   Injectable 5000 Unit(s) SubCutaneous every 8 hours  insulin glargine Injectable (LANTUS) 20 Unit(s) SubCutaneous at bedtime  insulin lispro (ADMELOG) corrective regimen sliding scale   SubCutaneous Before meals and at bedtime  losartan 100 milliGRAM(s) Oral every 24 hours  metoprolol succinate  milliGRAM(s) Oral daily    MEDICATIONS  (PRN):  dextrose Oral Gel 15 Gram(s) Oral once PRN Blood Glucose LESS THAN 70 milliGRAM(s)/deciliter      ALLERGIES:  Allergies    No Known Allergies    Intolerances        LABS:                        10.6   7.60  )-----------( 243      ( 30 Jan 2024 05:30 )             29.6     01-30    139  |  105  |  x   ----------------------------<  134<H>  4.6   |  25  |  x     Ca    10.3      30 Jan 2024 05:30        Urinalysis Basic - ( 30 Jan 2024 05:30 )    Color: x / Appearance: x / SG: x / pH: x  Gluc: 134 mg/dL / Ketone: x  / Bili: x / Urobili: x   Blood: x / Protein: x / Nitrite: x   Leuk Esterase: x / RBC: x / WBC x   Sq Epi: x / Non Sq Epi: x / Bacteria: x      CAPILLARY BLOOD GLUCOSE      POCT Blood Glucose.: 132 mg/dL (30 Jan 2024 09:02)      RADIOLOGY & ADDITIONAL TESTS: Reviewed. OVERNIGHT EVENTS: FAREED. BPs 180s --> 130s.    SUBJECTIVE / INTERVAL HPI: Patient seen and examined at bedside. No chest pain/HA/SOB. Voiding and making BMs. Reports compliance with all meds, occasionally "doesnt eat well."     PHYSICAL EXAM:     General: NAD  HEENT: PERRL, anicteric sclera; MMM; no teeth (does not have dentures)  Neck: supple  Cardiovascular: +S1/S2, RRR  Respiratory: CTA B/L; normal wob  Gastrointestinal: soft, NT/ND; +BSx4  Extremities: WWP; no edema, clubbing or cyanosis  Vascular: 2+ radial, DP/PT pulses B/L  Neurological: AAOx3; no focal deficits  Psychiatric: pleasant mood and affect  Dermatologic: no appreciable wounds or damage to the skin    VITAL SIGNS:  Vital Signs Last 24 Hrs  T(C): 36.3 (30 Jan 2024 06:02), Max: 37.1 (29 Jan 2024 19:35)  T(F): 97.4 (30 Jan 2024 06:02), Max: 98.8 (29 Jan 2024 19:35)  HR: 82 (30 Jan 2024 06:02) (77 - 114)  BP: 133/68 (30 Jan 2024 06:02) (133/68 - 249/129)  BP(mean): --  RR: 18 (30 Jan 2024 06:02) (18 - 20)  SpO2: 95% (30 Jan 2024 06:02) (94% - 98%)    Parameters below as of 30 Jan 2024 06:02  Patient On (Oxygen Delivery Method): room air          MEDICATIONS:  MEDICATIONS  (STANDING):  allopurinol 100 milliGRAM(s) Oral daily  amLODIPine   Tablet 10 milliGRAM(s) Oral every 24 hours  aspirin  chewable 81 milliGRAM(s) Oral daily  cloNIDine 0.2 milliGRAM(s) Oral every 12 hours  dextrose 5%. 1000 milliLiter(s) (50 mL/Hr) IV Continuous <Continuous>  dextrose 5%. 1000 milliLiter(s) (100 mL/Hr) IV Continuous <Continuous>  dextrose 50% Injectable 25 Gram(s) IV Push once  dextrose 50% Injectable 25 Gram(s) IV Push once  dextrose 50% Injectable 12.5 Gram(s) IV Push once  doxazosin 8 milliGRAM(s) Oral at bedtime  fenofibrate Tablet 145 milliGRAM(s) Oral daily  glucagon  Injectable 1 milliGRAM(s) IntraMuscular once  heparin   Injectable 5000 Unit(s) SubCutaneous every 8 hours  insulin glargine Injectable (LANTUS) 20 Unit(s) SubCutaneous at bedtime  insulin lispro (ADMELOG) corrective regimen sliding scale   SubCutaneous Before meals and at bedtime  losartan 100 milliGRAM(s) Oral every 24 hours  metoprolol succinate  milliGRAM(s) Oral daily    MEDICATIONS  (PRN):  dextrose Oral Gel 15 Gram(s) Oral once PRN Blood Glucose LESS THAN 70 milliGRAM(s)/deciliter      ALLERGIES:  Allergies    No Known Allergies    Intolerances        LABS:                        10.6   7.60  )-----------( 243      ( 30 Jan 2024 05:30 )             29.6     01-30    139  |  105  |  x   ----------------------------<  134<H>  4.6   |  25  |  x     Ca    10.3      30 Jan 2024 05:30        Urinalysis Basic - ( 30 Jan 2024 05:30 )    Color: x / Appearance: x / SG: x / pH: x  Gluc: 134 mg/dL / Ketone: x  / Bili: x / Urobili: x   Blood: x / Protein: x / Nitrite: x   Leuk Esterase: x / RBC: x / WBC x   Sq Epi: x / Non Sq Epi: x / Bacteria: x      CAPILLARY BLOOD GLUCOSE      POCT Blood Glucose.: 132 mg/dL (30 Jan 2024 09:02)      RADIOLOGY & ADDITIONAL TESTS: Reviewed.

## 2024-01-31 ENCOUNTER — TRANSCRIPTION ENCOUNTER (OUTPATIENT)
Age: 79
End: 2024-01-31

## 2024-01-31 VITALS
OXYGEN SATURATION: 95 % | RESPIRATION RATE: 18 BRPM | DIASTOLIC BLOOD PRESSURE: 68 MMHG | TEMPERATURE: 98 F | HEART RATE: 61 BPM | SYSTOLIC BLOOD PRESSURE: 107 MMHG

## 2024-01-31 LAB
ANION GAP SERPL CALC-SCNC: 9 MMOL/L — SIGNIFICANT CHANGE UP (ref 5–17)
BASOPHILS # BLD AUTO: 0.08 K/UL — SIGNIFICANT CHANGE UP (ref 0–0.2)
BASOPHILS NFR BLD AUTO: 1.1 % — SIGNIFICANT CHANGE UP (ref 0–2)
BUN SERPL-MCNC: 45 MG/DL — HIGH (ref 7–23)
CALCIUM SERPL-MCNC: 9.4 MG/DL — SIGNIFICANT CHANGE UP (ref 8.4–10.5)
CHLORIDE SERPL-SCNC: 101 MMOL/L — SIGNIFICANT CHANGE UP (ref 96–108)
CO2 SERPL-SCNC: 21 MMOL/L — LOW (ref 22–31)
CREAT SERPL-MCNC: 1.9 MG/DL — HIGH (ref 0.5–1.3)
EGFR: 26 ML/MIN/1.73M2 — LOW
EOSINOPHIL # BLD AUTO: 0.5 K/UL — SIGNIFICANT CHANGE UP (ref 0–0.5)
EOSINOPHIL NFR BLD AUTO: 7 % — HIGH (ref 0–6)
GLUCOSE BLDC GLUCOMTR-MCNC: 104 MG/DL — HIGH (ref 70–99)
GLUCOSE BLDC GLUCOMTR-MCNC: 135 MG/DL — HIGH (ref 70–99)
GLUCOSE SERPL-MCNC: 146 MG/DL — HIGH (ref 70–99)
HCT VFR BLD CALC: 28.7 % — LOW (ref 34.5–45)
HGB BLD-MCNC: 10 G/DL — LOW (ref 11.5–15.5)
IMM GRANULOCYTES NFR BLD AUTO: 0.3 % — SIGNIFICANT CHANGE UP (ref 0–0.9)
LYMPHOCYTES # BLD AUTO: 2.96 K/UL — SIGNIFICANT CHANGE UP (ref 1–3.3)
LYMPHOCYTES # BLD AUTO: 41.6 % — SIGNIFICANT CHANGE UP (ref 13–44)
MAGNESIUM SERPL-MCNC: 2.2 MG/DL — SIGNIFICANT CHANGE UP (ref 1.6–2.6)
MCHC RBC-ENTMCNC: 27.3 PG — SIGNIFICANT CHANGE UP (ref 27–34)
MCHC RBC-ENTMCNC: 34.8 GM/DL — SIGNIFICANT CHANGE UP (ref 32–36)
MCV RBC AUTO: 78.4 FL — LOW (ref 80–100)
MONOCYTES # BLD AUTO: 0.8 K/UL — SIGNIFICANT CHANGE UP (ref 0–0.9)
MONOCYTES NFR BLD AUTO: 11.3 % — SIGNIFICANT CHANGE UP (ref 2–14)
NEUTROPHILS # BLD AUTO: 2.75 K/UL — SIGNIFICANT CHANGE UP (ref 1.8–7.4)
NEUTROPHILS NFR BLD AUTO: 38.7 % — LOW (ref 43–77)
NRBC # BLD: 0 /100 WBCS — SIGNIFICANT CHANGE UP (ref 0–0)
PHOSPHATE SERPL-MCNC: 2.5 MG/DL — SIGNIFICANT CHANGE UP (ref 2.5–4.5)
PLATELET # BLD AUTO: 231 K/UL — SIGNIFICANT CHANGE UP (ref 150–400)
POTASSIUM SERPL-MCNC: 4.9 MMOL/L — SIGNIFICANT CHANGE UP (ref 3.5–5.3)
POTASSIUM SERPL-SCNC: 4.9 MMOL/L — SIGNIFICANT CHANGE UP (ref 3.5–5.3)
RBC # BLD: 3.66 M/UL — LOW (ref 3.8–5.2)
RBC # FLD: 15.3 % — HIGH (ref 10.3–14.5)
SODIUM SERPL-SCNC: 131 MMOL/L — LOW (ref 135–145)
WBC # BLD: 7.11 K/UL — SIGNIFICANT CHANGE UP (ref 3.8–10.5)
WBC # FLD AUTO: 7.11 K/UL — SIGNIFICANT CHANGE UP (ref 3.8–10.5)

## 2024-01-31 PROCEDURE — 96376 TX/PRO/DX INJ SAME DRUG ADON: CPT

## 2024-01-31 PROCEDURE — 84100 ASSAY OF PHOSPHORUS: CPT

## 2024-01-31 PROCEDURE — 81001 URINALYSIS AUTO W/SCOPE: CPT

## 2024-01-31 PROCEDURE — 84484 ASSAY OF TROPONIN QUANT: CPT

## 2024-01-31 PROCEDURE — 36415 COLL VENOUS BLD VENIPUNCTURE: CPT

## 2024-01-31 PROCEDURE — 82553 CREATINE MB FRACTION: CPT

## 2024-01-31 PROCEDURE — 85027 COMPLETE CBC AUTOMATED: CPT

## 2024-01-31 PROCEDURE — 99291 CRITICAL CARE FIRST HOUR: CPT | Mod: 25

## 2024-01-31 PROCEDURE — G0378: CPT

## 2024-01-31 PROCEDURE — 83735 ASSAY OF MAGNESIUM: CPT

## 2024-01-31 PROCEDURE — 83935 ASSAY OF URINE OSMOLALITY: CPT

## 2024-01-31 PROCEDURE — 96374 THER/PROPH/DIAG INJ IV PUSH: CPT

## 2024-01-31 PROCEDURE — 76775 US EXAM ABDO BACK WALL LIM: CPT

## 2024-01-31 PROCEDURE — 82570 ASSAY OF URINE CREATININE: CPT

## 2024-01-31 PROCEDURE — 84156 ASSAY OF PROTEIN URINE: CPT

## 2024-01-31 PROCEDURE — 93005 ELECTROCARDIOGRAM TRACING: CPT

## 2024-01-31 PROCEDURE — 84300 ASSAY OF URINE SODIUM: CPT

## 2024-01-31 PROCEDURE — 82550 ASSAY OF CK (CPK): CPT

## 2024-01-31 PROCEDURE — 82962 GLUCOSE BLOOD TEST: CPT

## 2024-01-31 PROCEDURE — 99239 HOSP IP/OBS DSCHRG MGMT >30: CPT | Mod: GC

## 2024-01-31 PROCEDURE — 96375 TX/PRO/DX INJ NEW DRUG ADDON: CPT

## 2024-01-31 PROCEDURE — 85025 COMPLETE CBC W/AUTO DIFF WBC: CPT

## 2024-01-31 PROCEDURE — 80048 BASIC METABOLIC PNL TOTAL CA: CPT

## 2024-01-31 RX ORDER — INSULIN GLARGINE 100 [IU]/ML
30 INJECTION, SOLUTION SUBCUTANEOUS
Qty: 0 | Refills: 0 | DISCHARGE

## 2024-01-31 RX ORDER — LOSARTAN POTASSIUM 100 MG/1
1 TABLET, FILM COATED ORAL
Qty: 0 | Refills: 0 | DISCHARGE

## 2024-01-31 RX ORDER — DAPAGLIFLOZIN 10 MG/1
1 TABLET, FILM COATED ORAL
Qty: 0 | Refills: 0 | DISCHARGE

## 2024-01-31 RX ADMIN — HEPARIN SODIUM 5000 UNIT(S): 5000 INJECTION INTRAVENOUS; SUBCUTANEOUS at 06:33

## 2024-01-31 RX ADMIN — Medication 6 UNIT(S): at 09:24

## 2024-01-31 RX ADMIN — Medication 145 MILLIGRAM(S): at 11:09

## 2024-01-31 RX ADMIN — HEPARIN SODIUM 5000 UNIT(S): 5000 INJECTION INTRAVENOUS; SUBCUTANEOUS at 13:34

## 2024-01-31 RX ADMIN — Medication 81 MILLIGRAM(S): at 11:09

## 2024-01-31 RX ADMIN — Medication 100 MILLIGRAM(S): at 06:33

## 2024-01-31 RX ADMIN — Medication 0.2 MILLIGRAM(S): at 07:25

## 2024-01-31 RX ADMIN — Medication 6 UNIT(S): at 13:33

## 2024-01-31 NOTE — PROGRESS NOTE ADULT - SUBJECTIVE AND OBJECTIVE BOX
OVERNIGHT EVENTS: SBP 90s-120s. Held AM hydral.     SUBJECTIVE / INTERVAL HPI: Patient seen and examined at bedside. No new concerns, no HA/CP/SOB, reports frequent voiding.    PHYSICAL EXAM:     General: NAD  HEENT: PERRL, anicteric sclera; MMM; no teeth (does not have dentures)  Neck: supple  Cardiovascular: +S1/S2, RRR  Respiratory: CTA B/L; normal wob  Gastrointestinal: soft, NT/ND; +BSx4  Extremities: WWP; no edema, clubbing or cyanosis  Vascular: 2+ radial, DP/PT pulses B/L  Neurological: AAOx3; no focal deficits  Psychiatric: pleasant mood and affect  Dermatologic: no appreciable wounds or damage to the skin    VITAL SIGNS:  Vital Signs Last 24 Hrs  T(C): 36.6 (31 Jan 2024 05:43), Max: 37.1 (30 Jan 2024 21:10)  T(F): 97.9 (31 Jan 2024 05:43), Max: 98.7 (30 Jan 2024 21:10)  HR: 71 (31 Jan 2024 07:22) (68 - 91)  BP: 122/62 (31 Jan 2024 07:22) (98/54 - 135/71)  BP(mean): --  RR: 19 (31 Jan 2024 05:43) (16 - 19)  SpO2: 95% (31 Jan 2024 05:43) (95% - 97%)    Parameters below as of 31 Jan 2024 05:43  Patient On (Oxygen Delivery Method): room air          MEDICATIONS:  MEDICATIONS  (STANDING):  amLODIPine   Tablet 10 milliGRAM(s) Oral every 24 hours  aspirin  chewable 81 milliGRAM(s) Oral daily  cloNIDine 0.2 milliGRAM(s) Oral every 12 hours  dextrose 5%. 1000 milliLiter(s) (50 mL/Hr) IV Continuous <Continuous>  dextrose 5%. 1000 milliLiter(s) (100 mL/Hr) IV Continuous <Continuous>  dextrose 50% Injectable 25 Gram(s) IV Push once  dextrose 50% Injectable 25 Gram(s) IV Push once  dextrose 50% Injectable 12.5 Gram(s) IV Push once  doxazosin 8 milliGRAM(s) Oral at bedtime  fenofibrate Tablet 145 milliGRAM(s) Oral daily  glucagon  Injectable 1 milliGRAM(s) IntraMuscular once  heparin   Injectable 5000 Unit(s) SubCutaneous every 8 hours  insulin glargine Injectable (LANTUS) 5 Unit(s) SubCutaneous at bedtime  insulin lispro (ADMELOG) corrective regimen sliding scale   SubCutaneous Before meals and at bedtime  insulin lispro Injectable (ADMELOG) 6 Unit(s) SubCutaneous three times a day before meals  lactated ringers. 1000 milliLiter(s) (100 mL/Hr) IV Continuous <Continuous>  metoprolol succinate  milliGRAM(s) Oral daily  sodium chloride 0.45%. 1000 milliLiter(s) (999 mL/Hr) IV Continuous <Continuous>    MEDICATIONS  (PRN):  dextrose Oral Gel 15 Gram(s) Oral once PRN Blood Glucose LESS THAN 70 milliGRAM(s)/deciliter      ALLERGIES:  Allergies    No Known Allergies    Intolerances        LABS:                        10.0   7.11  )-----------( 231      ( 31 Jan 2024 05:30 )             28.7     01-31    131<L>  |  101  |  45<H>  ----------------------------<  146<H>  4.9   |  21<L>  |  1.90<H>    Ca    9.4      31 Jan 2024 05:30  Phos  2.5     01-31  Mg     2.2     01-31        Urinalysis Basic - ( 31 Jan 2024 05:30 )    Color: x / Appearance: x / SG: x / pH: x  Gluc: 146 mg/dL / Ketone: x  / Bili: x / Urobili: x   Blood: x / Protein: x / Nitrite: x   Leuk Esterase: x / RBC: x / WBC x   Sq Epi: x / Non Sq Epi: x / Bacteria: x      CAPILLARY BLOOD GLUCOSE      POCT Blood Glucose.: 256 mg/dL (30 Jan 2024 22:38)      RADIOLOGY & ADDITIONAL TESTS: Reviewed.

## 2024-01-31 NOTE — DISCHARGE NOTE NURSING/CASE MANAGEMENT/SOCIAL WORK - NSDCFUADDAPPT_GEN_ALL_CORE_FT
You currently have an appointment at [facility] clinic for 2/13/24. However, we would like you to follow-up sooner than this. Their clinic will reach out to expedite your hospital follow-up appointment.

## 2024-01-31 NOTE — DISCHARGE NOTE PROVIDER - ATTENDING DISCHARGE PHYSICAL EXAMINATION:
Pt is 77 yo woman with labile BP readings on multiple BP meds. Planned for taper off clonidine, and transition to daily meds with goal for BP  of 150/90. Pt will follow up with one of the medical team members for longitudinal care. CHIQUITA is due to ANT in the setting of hypertensive crisis, need to be followed for improvement.

## 2024-01-31 NOTE — DISCHARGE NOTE PROVIDER - HOSPITAL COURSE
#Discharge: do not delete    79 y/o F w/ PMHx of resistant HTN (follows with nephrologist Dr. Pearson at [place]), HLD, PAD admitted for management of hypertensive urgency.     Hospital course (by problem):    Problem/Plan - 1:  ·  Problem: Hypertensive urgency.   ·  Plan: Pt w/ h/o of resistant HTN, followed by nephrology and with extensive outpatient work-up. Pheo r/o; urine metanephrine negative. Hyperaldosteronism r/o as Renin/Aldosterone ratio 5.84.   Has not gotten renal artery ultrasound at this time  no complaints and is comfortable at this itme, never had symptoms  unclear if complaint - endorses at times however per ed provider endorsed being noncomplaint  hctz recently stopped with hypercalcemia  home meds; clonidine 0.2 mg q12, Toprol 100 mg qd, amlodipine 10 mg qd, Losartan 100 mg qd, terazosin 10 mg qd.  - s/p hydral and labetalol IV & PO in ED  - renal ultrasound done inpatient - unremarkable    Plan:  - C/w amlodipine 10 mg in PM, toprol 100 mg in AM, clonidine 0.2 mg q12 tonight, terazosin 10mg qd (BPs ~110s-130s)  - holding losartan 100mg qd in CHIQUITA  - trialed on hydral 50 tid (when losartan was discontinued) but SBPs dropped to 90-110s  - consider tapering off clonidine outpatient given association with labile BPs  - pt consistently normotensive on 4 agent regimen, consider element of white coat hypertension and favor ambulatory BP monitoring     Problem/Plan - 2:  ·  Problem: Gout.   ·  Plan: pt w/ h/o of gout, home meds allopurinol 100 mg qd   - continue with home meds.     Problem/Plan - 3:  ·  Problem: Diabetes.   ·  Plan: Known history of DM2. On home farxiga 10 mg qd, lantus 30 u qhs     Plan:  - resume home meds  - consistent carb diet.     Problem/Plan - 4:  ·  Problem: Stage 3 chronic kidney disease.   ·  Plan: #CHIQUITA ON CKD  known hx of stage 3 ckd, followed by nephrology Dr. Pearson. Baseline Cr 1.29 today at 1.27   Cr elevated to 2.0 on 1/30 with fine granulat casts on UA, making urine, no retention. FeNA c/w prerenal. Making urine.    Plan:  - Trend Cr  - Hold losartan, resume when CHIQUITA resolves (pt normotensive on amlodipine, toprol, cardura and clonidine)    Patient was discharged to: home    New medications:   Changes to old medications:  Medications that were stopped: losartan    Items to follow up as outpatient:  - CHIQUITA, resume losartan when CHIQUITA resolves  - blood pressure, consider tapering off clonidine    Physical exam at the time of discharge:  General: NAD  HEENT: PERRL, anicteric sclera; MMM; no teeth (does not have dentures w her)  Neck: supple  Cardiovascular: +S1/S2, RRR  Respiratory: CTA B/L; normal wob  Gastrointestinal: soft, NT/ND; +BSx4  Extremities: WWP; no edema, clubbing or cyanosis  Vascular: 2+ radial, DP/PT pulses B/L  Neurological: AAOx3; no focal deficits  Psychiatric: pleasant mood and affect  Dermatologic: no appreciable wounds or damage to the skin       #Discharge: do not delete    77 y/o F w/ PMHx of resistant HTN (follows with nephrologist Dr. Pearson at [place]), HLD, PAD admitted for management of hypertensive urgency.     Hospital course (by problem):    Problem/Plan - 1:  ·  Problem: Hypertensive urgency.   ·  Plan: Pt w/ h/o of resistant HTN, followed by nephrology and with extensive outpatient work-up. Pheo r/o; urine metanephrine negative. Hyperaldosteronism r/o as Renin/Aldosterone ratio 5.84.   Has not gotten renal artery ultrasound at this time  no complaints and is comfortable at this itme, never had symptoms  unclear if complaint - endorses at times however per ed provider endorsed being noncomplaint  hctz recently stopped with hypercalcemia  home meds; clonidine 0.2 mg q12, Toprol 100 mg qd, amlodipine 10 mg qd, Losartan 100 mg qd, terazosin 10 mg qd.  - s/p hydral and labetalol IV & PO in ED  - renal ultrasound done inpatient - unremarkable    Plan:  - C/w amlodipine 10 mg in PM, toprol 100 mg in AM, clonidine 0.2 mg q12 tonight, terazosin 10mg qd (BPs ~110s-130s)  - holding losartan 100mg qd in CHIQUITA  - trialed on hydral 50 tid (when losartan was discontinued) but SBPs dropped to 90-110s  - consider tapering off clonidine outpatient given association with labile BPs  - pt consistently normotensive on 4 agent regimen, consider element of white coat hypertension and favor ambulatory BP monitoring     Problem/Plan - 2:  ·  Problem: Gout.   ·  Plan: pt w/ h/o of gout, home meds allopurinol 100 mg qd   - continue with home meds.     Problem/Plan - 3:  ·  Problem: Diabetes.   ·  Plan: Known history of DM2. On home farxiga 10 mg qd, lantus 30 u qhs     Plan:  - resume home meds  - consistent carb diet.     Problem/Plan - 4:  ·  Problem: Stage 3 chronic kidney disease.   ·  Plan: #CHIQUITA ON CKD  known hx of stage 3 ckd, followed by nephrology Dr. Pearson. Baseline Cr 1.29 peaked at 2.0 this admission, now downtrending.  Cr elevated to 2.0 on 1/30 with fine granulat casts on UA, making urine, no retention. FeNA c/w prerenal. Making urine. Consider ATN vs. hypertensive injury.    Plan:  - Trend Cr  - Hold losartan, resume when CHIQUITA resolves (pt normotensive on amlodipine, toprol, cardura and clonidine)    Patient was discharged to: home    New medications:   Changes to old medications:  Medications that were stopped: losartan    Items to follow up as outpatient:  - CHIQUITA, resume losartan when CHIQUITA resolves  - blood pressure, consider tapering off clonidine    Physical exam at the time of discharge:  General: NAD  HEENT: PERRL, anicteric sclera; MMM; no teeth (does not have dentures w her)  Neck: supple  Cardiovascular: +S1/S2, RRR  Respiratory: CTA B/L; normal wob  Gastrointestinal: soft, NT/ND; +BSx4  Extremities: WWP; no edema, clubbing or cyanosis  Vascular: 2+ radial, DP/PT pulses B/L  Neurological: AAOx3; no focal deficits  Psychiatric: pleasant mood and affect  Dermatologic: no appreciable wounds or damage to the skin

## 2024-01-31 NOTE — DISCHARGE NOTE PROVIDER - NSDCMRMEDTOKEN_GEN_ALL_CORE_FT
allopurinol 100 mg oral tablet: orally once a day  amLODIPine 10 mg oral tablet: 1 tab(s) orally once a day  aspirin 81 mg oral tablet, chewable: 1 tab(s) orally once a day  cloNIDine 0.2 mg oral tablet: 1 tab(s) orally every 12 hours  Farxiga 10 mg oral tablet: 1 tab(s) orally once a day  fenofibrate 145 mg oral tablet: 1 tab(s) orally once a day  Insulin Pen Needles, 4mm: 1 application subcutaneously 4 times a day. ** Use with insulin pen **  Lantus 100 units/mL subcutaneous solution: 30 subcutaneous once a day (at bedtime)  lovastatin 40 mg oral tablet: 1 tab(s) orally once a day  terazosin 10 mg oral capsule: 1 cap(s) orally once a day (at bedtime)  Toprol-XL 50 mg oral tablet, extended release: 2 tab(s) orally once a day

## 2024-01-31 NOTE — DISCHARGE NOTE NURSING/CASE MANAGEMENT/SOCIAL WORK - PATIENT PORTAL LINK FT
You can access the FollowMyHealth Patient Portal offered by [facility] by registering at the following website: [email or web address]. By joining [facility]’s FollowMyHealth portal, you will also be able to view your health information using other applications (apps) compatible with our system.

## 2024-01-31 NOTE — DISCHARGE NOTE PROVIDER - NSDCCPCAREPLAN_GEN_ALL_CORE_FT
PRINCIPAL DISCHARGE DIAGNOSIS  Diagnosis: Hypertension  Assessment and Plan of Treatment: You were sent to the hospital due to your elevated BPs at clinic. While you were given some IV medications initially, your pressures were normal when you were resumed on your home regimen. Due to a kidney injury (elevated creatinine), your losartan was held.  Please continue taking amlodipine 10mg daily, metoprolol succinate 100mg daily, clonidine 0.2mg twice a day, and terazosin 10mg once a day. You should take amlodipine, clonidine (in morning and evenign), and terazosin in the morning and take metoprolol at noon to space apart your medications. You likely have element of white coat hypertension where your blood pressure rises during doctor visits. Therefore, it is important to continue "ambulatory monitoring" where you check your blood pressure every day at home.      SECONDARY DISCHARGE DIAGNOSES  Diagnosis: CHIQUITA (acute kidney injury)  Assessment and Plan of Treatment: Your creatinine was more elevated than normal which is suggestive of an acute kidney injury ontop of your known kidney disease. We sent urine studies which showed you were dehydrated, however this kidney injury may also be related to your episode of high blood pressure. As you are actively peeing without issue and your creatinine is downtrending, we feel that you may be discharged with close follow-up. In the mean time, please discontinue your losartan for now. Your blood pressure was okay off losartan, so you may resume the losartan when your kidney function improves.

## 2024-01-31 NOTE — DISCHARGE NOTE PROVIDER - NSDCFUADDAPPT_GEN_ALL_CORE_FT
You currently have an appointment at [facility] clinic for 2/13/24. However, we would like you to follow-up sooner than this. Their clinic will reach out to expedite your hospital follow-up appointment.

## 2024-01-31 NOTE — DISCHARGE NOTE PROVIDER - NSDCFUSCHEDAPPT_GEN_ALL_CORE_FT
Magnolia Arevalo  [facility]  [facility] [address]  Scheduled Appointment: 02/13/2024

## 2024-02-01 ENCOUNTER — NON-APPOINTMENT (OUTPATIENT)
Age: 79
End: 2024-02-01

## 2024-02-02 ENCOUNTER — APPOINTMENT (OUTPATIENT)
Dept: INTERNAL MEDICINE | Facility: CLINIC | Age: 79
End: 2024-02-02

## 2024-02-02 ENCOUNTER — APPOINTMENT (OUTPATIENT)
Dept: INTERNAL MEDICINE | Facility: CLINIC | Age: 79
End: 2024-02-02
Payer: MEDICARE

## 2024-02-02 VITALS
HEIGHT: 64 IN | HEART RATE: 88 BPM | BODY MASS INDEX: 29.37 KG/M2 | TEMPERATURE: 97.8 F | DIASTOLIC BLOOD PRESSURE: 71 MMHG | SYSTOLIC BLOOD PRESSURE: 166 MMHG | OXYGEN SATURATION: 99 % | WEIGHT: 172 LBS

## 2024-02-02 DIAGNOSIS — N17.9 ACUTE KIDNEY FAILURE, UNSPECIFIED: ICD-10-CM

## 2024-02-02 PROCEDURE — 99214 OFFICE O/P EST MOD 30 MIN: CPT | Mod: 25

## 2024-02-02 PROCEDURE — 36415 COLL VENOUS BLD VENIPUNCTURE: CPT

## 2024-02-02 RX ORDER — TERAZOSIN 10 MG/1
10 CAPSULE ORAL
Qty: 90 | Refills: 3 | Status: ACTIVE | COMMUNITY
Start: 2022-07-11

## 2024-02-02 NOTE — HISTORY OF PRESENT ILLNESS
[FreeTextEntry2] : 77yo F PMH resistant HTN, HLD, T2DM, diabetic retinopathy, HFpEF, CKD (Cr 1.8-3.0), carotid stenosis (s/p R endarterectomy), gout, anemia, RLE neuropathy here for post-discharge appt. pt was sent to ED on 1/29 from  for HTN urgency. Pt admitted, tx with IV/PO labetolol, losartan held for CHIQUITA (Cr 1.9), and d/c with BPs 120s/80s. Since dc pt feels well, no chest pain SOB HA blurry vision. Reports compliance with medications and gets BPs of 140-160 at home. Has appt with Dr. Pearson (nephrology) on Wednesday.

## 2024-02-02 NOTE — ASSESSMENT
[FreeTextEntry1] : 79yo F PMH resistant HTN, HLD, T2DM, diabetic retinopathy, HFpEF, CKD (Cr 1.8-3.0), carotid stenosis (s/p R endarterectomy), gout, anemia, RLE neuropathy here for post-discharge appt.

## 2024-02-02 NOTE — PLAN
[FreeTextEntry1] : #HTN: 160s todays, 140-160s at home on 4 drug regimen - continue amlodipine 10mg qd - c/w clonidine 0.2mg q12hr  - c/w terazosin 10mg qhs - Toprol 100mg qd - continue follow up Dr. Pearson (nephrology) this wednesday, will fax todays labs/notes/DC summary to Dr. Pearson (HIPPA form filled out) - holding losartan for CHIQUITA - repeat BMP today  #DM - c/w lantus 30 - holding metformin for CHIQUITA - BMP today   RTC 4-6 weeks to f/u BP and Cr

## 2024-02-05 ENCOUNTER — TRANSCRIPTION ENCOUNTER (OUTPATIENT)
Age: 79
End: 2024-02-05

## 2024-02-05 ENCOUNTER — APPOINTMENT (OUTPATIENT)
Dept: INTERNAL MEDICINE | Facility: CLINIC | Age: 79
End: 2024-02-05

## 2024-02-05 ENCOUNTER — NON-APPOINTMENT (OUTPATIENT)
Age: 79
End: 2024-02-05

## 2024-02-05 LAB
ANION GAP SERPL CALC-SCNC: 11 MMOL/L
BUN SERPL-MCNC: 47 MG/DL
CALCIUM SERPL-MCNC: 10.3 MG/DL
CHLORIDE SERPL-SCNC: 103 MMOL/L
CO2 SERPL-SCNC: 23 MMOL/L
CREAT SERPL-MCNC: 1.65 MG/DL
EGFR: 31 ML/MIN/1.73M2
GLUCOSE SERPL-MCNC: 175 MG/DL
POTASSIUM SERPL-SCNC: 5.6 MMOL/L
SODIUM SERPL-SCNC: 137 MMOL/L

## 2024-02-20 ENCOUNTER — APPOINTMENT (OUTPATIENT)
Dept: INTERNAL MEDICINE | Facility: CLINIC | Age: 79
End: 2024-02-20

## 2024-03-01 NOTE — ED ADULT TRIAGE NOTE - BANDS:
Vital Signs Last 24 Hrs  T(C): 37.1 (01 Mar 2024 18:52), Max: 37.1 (01 Mar 2024 18:52)  T(F): 98.7 (01 Mar 2024 18:52), Max: 98.7 (01 Mar 2024 18:52)  HR: 100 (01 Mar 2024 18:52) (100 - 100)  BP: 119/56 (01 Mar 2024 18:52) (119/56 - 119/56)  RR: 20 (01 Mar 2024 18:52) (20 - 20)    Gen: AAOX3, NAD  HEENT: large left eyelid stye, erythematous, ttp   Lungs: CTABL  CVS: s1, s2, RRR  Abdomen: soft, nontender, nondistended, gravid   BSS: variable presentation, ant placenta, FHR 175bpm, ASHLEY adequate Fall Risk;

## 2024-03-04 ENCOUNTER — APPOINTMENT (OUTPATIENT)
Dept: INTERNAL MEDICINE | Facility: CLINIC | Age: 79
End: 2024-03-04

## 2024-03-11 ENCOUNTER — APPOINTMENT (OUTPATIENT)
Dept: INTERNAL MEDICINE | Facility: CLINIC | Age: 79
End: 2024-03-11
Payer: MEDICARE

## 2024-03-11 VITALS
HEIGHT: 64 IN | SYSTOLIC BLOOD PRESSURE: 155 MMHG | DIASTOLIC BLOOD PRESSURE: 64 MMHG | TEMPERATURE: 97.3 F | BODY MASS INDEX: 30.05 KG/M2 | WEIGHT: 176 LBS | OXYGEN SATURATION: 98 % | HEART RATE: 93 BPM

## 2024-03-11 DIAGNOSIS — I50.20 UNSPECIFIED SYSTOLIC (CONGESTIVE) HEART FAILURE: ICD-10-CM

## 2024-03-11 DIAGNOSIS — E78.5 HYPERLIPIDEMIA, UNSPECIFIED: ICD-10-CM

## 2024-03-11 DIAGNOSIS — E87.5 HYPERKALEMIA: ICD-10-CM

## 2024-03-11 PROCEDURE — 36415 COLL VENOUS BLD VENIPUNCTURE: CPT

## 2024-03-11 PROCEDURE — 99214 OFFICE O/P EST MOD 30 MIN: CPT | Mod: 25

## 2024-03-11 NOTE — END OF VISIT
[] : Resident [FreeTextEntry3] : 77 yo patient with DM on insulin, CKD and resistant hypertension. She has no complaints. K+ was 5.6 last visit. Will repeat today. BP at home in a good range. However, patient is using a wrist cuff. Will order an arm cuff for the patient and have her return in 1 week.

## 2024-03-11 NOTE — PHYSICAL EXAM
[No Acute Distress] : no acute distress [Well Nourished] : well nourished [Well Developed] : well developed [Well-Appearing] : well-appearing [Normal Sclera/Conjunctiva] : normal sclera/conjunctiva [PERRL] : pupils equal round and reactive to light [EOMI] : extraocular movements intact [Normal Outer Ear/Nose] : the outer ears and nose were normal in appearance [Normal Oropharynx] : the oropharynx was normal [No JVD] : no jugular venous distention [No Lymphadenopathy] : no lymphadenopathy [Supple] : supple [Thyroid Normal, No Nodules] : the thyroid was normal and there were no nodules present [No Respiratory Distress] : no respiratory distress  [No Accessory Muscle Use] : no accessory muscle use [Clear to Auscultation] : lungs were clear to auscultation bilaterally [Normal Rate] : normal rate  [Regular Rhythm] : with a regular rhythm [Normal S1, S2] : normal S1 and S2 [No Murmur] : no murmur heard [No Carotid Bruits] : no carotid bruits [No Abdominal Bruit] : a ~M bruit was not heard ~T in the abdomen [No Varicosities] : no varicosities [Pedal Pulses Present] : the pedal pulses are present [No Edema] : there was no peripheral edema [No Palpable Aorta] : no palpable aorta [No Extremity Clubbing/Cyanosis] : no extremity clubbing/cyanosis [Soft] : abdomen soft [Non Tender] : non-tender [Non-distended] : non-distended [No Masses] : no abdominal mass palpated [No HSM] : no HSM [Normal Posterior Cervical Nodes] : no posterior cervical lymphadenopathy [Normal Bowel Sounds] : normal bowel sounds [Normal Anterior Cervical Nodes] : no anterior cervical lymphadenopathy [No CVA Tenderness] : no CVA  tenderness [No Joint Swelling] : no joint swelling [No Spinal Tenderness] : no spinal tenderness [Grossly Normal Strength/Tone] : grossly normal strength/tone [No Rash] : no rash [No Focal Deficits] : no focal deficits [Coordination Grossly Intact] : coordination grossly intact [Deep Tendon Reflexes (DTR)] : deep tendon reflexes were 2+ and symmetric [Normal Gait] : normal gait [Normal Insight/Judgement] : insight and judgment were intact [Normal Affect] : the affect was normal

## 2024-03-11 NOTE — HISTORY OF PRESENT ILLNESS
[FreeTextEntry1] : routine f/u [de-identified] : 78F Hx resistant HTN (on Amlodipine 10mg Qd, Clonidine 0.2mg qhs, Terazosin 10mg qhs. Recent adm 01/2024 for HTN urgency. Home BPs 150/- to 110/60s), HLD (on Lovastatin 40mg Qd), T2DM w/ diabetic retinopathy (on Lantus 31U qhs, AM FSGs 95-140s), CKD (Cr 1.8-3.0), carotid stenosis (s/p R endarterectomy),  HFpEF (on Losartan 100mg Qd, Toprol 100mg Qd), Gout, Anemia, RLE neuropathy here for routine follow up. Asymptomatic, no complaints. ROS negative. 02/2024 bloodwork revealed K5.6. She has an appointment with her Nephrologoist Dr. Pearson with plan for bloodwork tomorrow.

## 2024-03-11 NOTE — ASSESSMENT
[FreeTextEntry1] : 78F Hx resistant HTN (on Amlodipine 10mg Qd, Clonidine 0.2mg qhs, Terazosin 10mg qhs. Recent adm 01/2024 for HTN urgency. Home BPs 150/- to 110/60s), HLD (on Lovastatin 40mg Qd), T2DM w/ diabetic retinopathy (on Lantus 31U qhs, AM FSGs 95-140s), CKD (Cr 1.8-3.0), carotid stenosis (s/p R endarterectomy),  HFpEF (on Losartan 100mg Qd, Toprol 100mg Qd), Gout, Anemia, RLE neuropathy here for routine follow up.  #Resistant HTN  #Hyperkalemia on Losartan 100mg Qd, Amlodipine 10mg Qd, Clonidine 0.2mg qhs, Terazosin 10mg qhs. Recent adm 01/2024 for HTN urgency. Home BPs 150/- to 110/60s) - c/w antihypertensive regimen as above, bring BP log at next visit - Reordered 14in BP cuff for home BP machine, advised to stop using wrist readings - w/ fax her blood work to Dr. Pearson (nephrology) as well  #HFpEF - c/w Losartan 100mg Qd, Toprol 100mg Qd  #HLD  - c/w Lovastatin 40mg Qd  #T2DM w/ diabetic retinopathy AM FSGs 95-140s. Stopped Metformin - c/w Lantus 31U qhs - Has podiatrist appt 03/2024  #CKD4 #Hyperkalemia 02/2024 K5.6 - Follows Dr. Pearson (nephrology)   RTC 1 week with new home BP cuff and machine to assess correct usage and accurate readings

## 2024-03-13 LAB
ALBUMIN SERPL ELPH-MCNC: 4.4 G/DL
ALP BLD-CCNC: 123 U/L
ALT SERPL-CCNC: 15 U/L
ANION GAP SERPL CALC-SCNC: 11 MMOL/L
AST SERPL-CCNC: 18 U/L
BILIRUB SERPL-MCNC: 0.5 MG/DL
BUN SERPL-MCNC: 40 MG/DL
CALCIUM SERPL-MCNC: 10.2 MG/DL
CHLORIDE SERPL-SCNC: 107 MMOL/L
CHOLEST SERPL-MCNC: 190 MG/DL
CO2 SERPL-SCNC: 22 MMOL/L
CREAT SERPL-MCNC: 1.9 MG/DL
EGFR: 26 ML/MIN/1.73M2
ESTIMATED AVERAGE GLUCOSE: 237 MG/DL
GLUCOSE SERPL-MCNC: 217 MG/DL
HBA1C MFR BLD HPLC: 9.9 %
HDLC SERPL-MCNC: 60 MG/DL
LDLC SERPL CALC-MCNC: 113 MG/DL
NONHDLC SERPL-MCNC: 130 MG/DL
POTASSIUM SERPL-SCNC: 5.1 MMOL/L
PROT SERPL-MCNC: 7.3 G/DL
SODIUM SERPL-SCNC: 141 MMOL/L
TRIGL SERPL-MCNC: 96 MG/DL

## 2024-03-19 ENCOUNTER — APPOINTMENT (OUTPATIENT)
Dept: INTERNAL MEDICINE | Facility: CLINIC | Age: 79
End: 2024-03-19
Payer: MEDICARE

## 2024-03-19 VITALS
WEIGHT: 176 LBS | BODY MASS INDEX: 30.05 KG/M2 | TEMPERATURE: 98 F | OXYGEN SATURATION: 98 % | HEIGHT: 64 IN | DIASTOLIC BLOOD PRESSURE: 69 MMHG | SYSTOLIC BLOOD PRESSURE: 148 MMHG | HEART RATE: 99 BPM

## 2024-03-19 PROCEDURE — G2211 COMPLEX E/M VISIT ADD ON: CPT

## 2024-03-19 PROCEDURE — 99214 OFFICE O/P EST MOD 30 MIN: CPT

## 2024-03-19 NOTE — HISTORY OF PRESENT ILLNESS
[FreeTextEntry1] : Pt is here for a follow up. [de-identified] : 78F pmHx resistant HTN, HLD, T2DM cb diabetic retinopathy, CKD (Cr 1.8-3.0), carotid stenosis (s/p R endarterectomy), HFpEF (on Losartan 100mg Qd, Toprol 100mg Qd), Gout, Anemia, RLE neuropathy, presenting for medication refills & BP follow up. She explains that she was sent a script for BP cuff following appointment last week, as her older one was deemed to be giving inaccurate readings, but says she had issues securing coverage from her insurance company & was unable to obtain the cuff.  However, pt's daughter reportedly purchased a cuff for her, which should be arriving to her home by [facility] or tomorrow.   Pt states that she willl

## 2024-03-19 NOTE — PLAN
[FreeTextEntry1] : 78F pmHx resistant HTN, HLD, T2DM cb diabetic retinopathy, CKD (Cr 1.8-3.0), carotid stenosis (s/p R endarterectomy), HFpEF, Gout, Anemia, RLE neuropathy, presenting for medication refills & BP follow up.  #Resistant HTN #Hyperkalemia standing treatments: Losartan 100mg qd Amlodipine 10mg qd Clonidine 0.2mg qhs Terazosin 10mg qhs **pt also reports she takes 1/2 pill of low-dose furosemide, prescribed to her by her nephrologist, with whom she will be following up tomorrow (follows w outside nephrologist Dr. Pearson) - sent refill of furosemide (1/2 20mg pill qd), & advised pt to confirm dose w her nephrologist at upcoming appointment - continue w antihypertensive regimen as above, plan for follow up appointment in 1 month w BP log data  #HFpEF standing treatments: Losartan 100mg qd Toprol 100mg qd  #HLD standing treatments: Lovastatin 40mg qd  #T2DM w diabetic retinopathy standing treatments: Lantus 31u qhs farxiga 5mg qd (started 3/11/24 followin ha1c 9.9%)

## 2024-03-19 NOTE — PHYSICAL EXAM
[No Acute Distress] : no acute distress [Well Nourished] : well nourished [Well Developed] : well developed [Well-Appearing] : well-appearing [Normal Sclera/Conjunctiva] : normal sclera/conjunctiva [EOMI] : extraocular movements intact [Normal Outer Ear/Nose] : the outer ears and nose were normal in appearance [Normal Oropharynx] : the oropharynx was normal [No Respiratory Distress] : no respiratory distress  [No Accessory Muscle Use] : no accessory muscle use [Clear to Auscultation] : lungs were clear to auscultation bilaterally [Normal Rate] : normal rate  [Regular Rhythm] : with a regular rhythm [Soft] : abdomen soft [Non Tender] : non-tender [Non-distended] : non-distended [Normal Bowel Sounds] : normal bowel sounds [Coordination Grossly Intact] : coordination grossly intact [No Focal Deficits] : no focal deficits [Normal Gait] : normal gait [Normal Affect] : the affect was normal [Normal Insight/Judgement] : insight and judgment were intact

## 2024-04-16 ENCOUNTER — APPOINTMENT (OUTPATIENT)
Dept: INTERNAL MEDICINE | Facility: CLINIC | Age: 79
End: 2024-04-16
Payer: MEDICARE

## 2024-04-16 VITALS
TEMPERATURE: 97.3 F | HEART RATE: 93 BPM | WEIGHT: 172 LBS | HEIGHT: 64 IN | BODY MASS INDEX: 29.37 KG/M2 | SYSTOLIC BLOOD PRESSURE: 157 MMHG | OXYGEN SATURATION: 98 % | DIASTOLIC BLOOD PRESSURE: 74 MMHG

## 2024-04-16 VITALS — SYSTOLIC BLOOD PRESSURE: 128 MMHG | DIASTOLIC BLOOD PRESSURE: 62 MMHG

## 2024-04-16 DIAGNOSIS — I50.30 UNSPECIFIED DIASTOLIC (CONGESTIVE) HEART FAILURE: ICD-10-CM

## 2024-04-16 PROCEDURE — 99214 OFFICE O/P EST MOD 30 MIN: CPT

## 2024-04-16 PROCEDURE — G2211 COMPLEX E/M VISIT ADD ON: CPT

## 2024-04-16 RX ORDER — ALCOHOL ANTISEPTIC PADS
33G X 6 MM PADS, MEDICATED (EA) TOPICAL
Qty: 30 | Refills: 5 | Status: DISCONTINUED | COMMUNITY
Start: 2020-09-11 | End: 2024-04-16

## 2024-04-16 RX ORDER — METFORMIN HYDROCHLORIDE 500 MG/1
500 TABLET, COATED ORAL DAILY
Refills: 0 | Status: DISCONTINUED | COMMUNITY
Start: 2024-01-29 | End: 2024-04-16

## 2024-04-16 RX ORDER — MULTIVIT-MINS/IRON/FOLIC/LYCOP 8-200-600
70 TABLET ORAL
Qty: 30 | Refills: 5 | Status: ACTIVE | COMMUNITY
Start: 2021-07-19 | End: 1900-01-01

## 2024-04-16 RX ORDER — INSULIN GLARGINE 100 [IU]/ML
100 INJECTION, SOLUTION SUBCUTANEOUS
Qty: 3 | Refills: 3 | Status: ACTIVE | COMMUNITY
Start: 2023-04-05 | End: 1900-01-01

## 2024-04-16 NOTE — HISTORY OF PRESENT ILLNESS
[FreeTextEntry1] : Pt is here for a follow up.  [de-identified] : Ms. Malcolm is an 78 F w/ resistant HTN, HLD, T2DM cb diabetic retinopathy, CKD (Cr 1.8-3.0), carotid stenosis (s/p R endarterectomy), HFpEF (on Losartan 100mg Qd, Toprol 100mg Qd), Gout, Anemia, RLE neuropathy who presents for medication refills & BP 1-month follow up. The patient has been compliant with her medications and has had no side effects. She brought her BP cuff with her and her average BPs have been 130/60s. She is currently walking or cycling to stay active and to lose weight. She denies any SOB/CP, palpitations, abd pain, n/v/d, or LE swelling.

## 2024-04-16 NOTE — ASSESSMENT
[FreeTextEntry1] : Ms. Malcolm is an 78 F w/ resistant HTN, HLD, T2DM cb diabetic retinopathy, CKD (Cr 1.8-3.0), carotid stenosis (s/p R endarterectomy), HFpEF (on Losartan 100mg Qd, Toprol 100mg Qd), Gout, Anemia, RLE neuropathy who presents for medication refills & BP 1-month follow up.  Plan for f/u in June for annual exam w/ lab work and possible pre-op for eye surgery for retinopathy Encouraged patient to get Shingrix and Tdap vaccine at local pharmacy; plan to administer if not completed at next visit

## 2024-05-07 ENCOUNTER — APPOINTMENT (OUTPATIENT)
Dept: INTERNAL MEDICINE | Facility: CLINIC | Age: 79
End: 2024-05-07

## 2024-05-14 ENCOUNTER — APPOINTMENT (OUTPATIENT)
Dept: VASCULAR SURGERY | Facility: CLINIC | Age: 79
End: 2024-05-14
Payer: MEDICARE

## 2024-05-14 VITALS
HEART RATE: 106 BPM | DIASTOLIC BLOOD PRESSURE: 76 MMHG | SYSTOLIC BLOOD PRESSURE: 182 MMHG | BODY MASS INDEX: 29.37 KG/M2 | HEIGHT: 64 IN | WEIGHT: 172 LBS

## 2024-05-14 DIAGNOSIS — I65.23 OCCLUSION AND STENOSIS OF BILATERAL CAROTID ARTERIES: ICD-10-CM

## 2024-05-14 PROCEDURE — 99213 OFFICE O/P EST LOW 20 MIN: CPT

## 2024-05-14 PROCEDURE — 93880 EXTRACRANIAL BILAT STUDY: CPT

## 2024-05-14 NOTE — PHYSICAL EXAM
[Normal Thyroid] : the thyroid was normal [Carotid Bruits] : carotid bruit  [Normal Breath Sounds] : Normal breath sounds [Normal Heart Sounds] : normal heart sounds [Normal Rate and Rhythm] : normal rate and rhythm [2+] : left 2+ [No Rash or Lesion] : No rash or lesion [Alert] : alert [Calm] : calm [JVD] : no jugular venous distention  [Right Carotid Bruit] : no bruit heard over the right carotid [Left Carotid Bruit] : no bruit heard over the left carotid [Purpura] : no purpura  [Petechiae] : no petechiae [Skin Ulcer] : no ulcer [Skin Induration] : no induration [de-identified] : Well-nourished, NAD [de-identified] : NC/AT, barbaraictalbert, EOMIx6 [de-identified] : FROM throughout, strength 5/5x4 [de-identified] : CNII-XII grossly intact, CITLALLI grossly intact

## 2024-05-14 NOTE — HISTORY OF PRESENT ILLNESS
[FreeTextEntry1] : 78yoF w/progression of her R ICA stenosis noted on surveillance duplex, now s/p successful R CEA w/patch angioplasty, utilization of shunt w/o complication, f/u for her 6mo surveillance duplex.  Pt denies any post-operative neuro symptoms at this time including visual disturbances, headaches, weakness, and is back on all home meds.

## 2024-05-14 NOTE — PROCEDURE
[FreeTextEntry1] : Carotid duplex performed to evaluate for CEA patency reveals widely patent R CEA and R vertebral artery patent w/antegrade flow, L ICA stenosis 50-69% and stable.

## 2024-05-14 NOTE — ASSESSMENT
[FreeTextEntry1] : 78yoF w/progression of her R ICA stenosis noted on surveillance duplex, now s/p successful R CEA w/patch angioplasty, utilization of shunt w/o complication, f/u for her 6mo surveillance duplex.  Pt denies any post-operative neuro symptoms at this time including visual disturbances, headaches, weakness, and is back on all home meds.  Neuro exam WNL today, and carotid duplex performed to evaluate for CEA patency reveals widely patent R CEA and R vertebral artery patent w/antegrade flow, L ICA stenosis 50-69% and stable.  Reassured pt that the hematoma will resolve w/time; she was instructed to RTO in 6mos for surveillance.

## 2024-05-27 NOTE — ED ADULT NURSE NOTE - ISAR SCORE
LOV: 1/12/24 Telemed  Last Refill: 2/26/24    No future appointments.    Patient is due for colocancer screening, ACT and diabetic care gaps  Rn searched care everywhere with no results   2

## 2024-06-06 ENCOUNTER — APPOINTMENT (OUTPATIENT)
Dept: INTERNAL MEDICINE | Facility: CLINIC | Age: 79
End: 2024-06-06
Payer: MEDICARE

## 2024-06-06 VITALS
DIASTOLIC BLOOD PRESSURE: 77 MMHG | TEMPERATURE: 98 F | HEART RATE: 85 BPM | HEIGHT: 64 IN | BODY MASS INDEX: 29.02 KG/M2 | SYSTOLIC BLOOD PRESSURE: 158 MMHG | OXYGEN SATURATION: 98 % | WEIGHT: 170 LBS

## 2024-06-06 DIAGNOSIS — E11.9 TYPE 2 DIABETES MELLITUS W/OUT COMPLICATIONS: ICD-10-CM
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
## 2024-06-06 LAB — HBA1C MFR BLD HPLC: 8.5

## 2024-06-06 PROCEDURE — G2211 COMPLEX E/M VISIT ADD ON: CPT

## 2024-06-06 PROCEDURE — 83036 HEMOGLOBIN GLYCOSYLATED A1C: CPT | Mod: QW

## 2024-06-06 PROCEDURE — 99214 OFFICE O/P EST MOD 30 MIN: CPT | Mod: GC

## 2024-06-06 RX ORDER — DAPAGLIFLOZIN 5 MG/1
5 TABLET, FILM COATED ORAL DAILY
Qty: 30 | Refills: 2 | Status: DISCONTINUED | COMMUNITY
Start: 2024-03-13 | End: 2024-06-06

## 2024-06-06 RX ORDER — METOPROLOL SUCCINATE 50 MG/1
50 TABLET, EXTENDED RELEASE ORAL DAILY
Qty: 60 | Refills: 4 | Status: DISCONTINUED | COMMUNITY
Start: 2024-01-29 | End: 2024-06-06

## 2024-06-06 NOTE — END OF VISIT
[] : Resident [Time Spent: ___ minutes] : I have spent [unfilled] minutes of time on the encounter. [FreeTextEntry3] : 78F w/dm, htn, ckd, hld, carotid stenosis, hfpef, gout, anemia, neuropathy here for followup. reports fall about 2 weeks ago, mechanical  HTN - BP elevated, adherent to meds, but not taking Clonidine q12, only daily. Educated that pt should be taking it BID, RTC 2 weeks.  DM2 - POCT today, last 9.9, c/w Lantus 30U qhs. Morning -150s  CKD - labs from Dr. Pearson reviewed, Cr 1.8  HLD - c/w lovastatin

## 2024-06-06 NOTE — ASSESSMENT
[FreeTextEntry1] : #HTN Patient BP remains elevated in clinic today 158/77 Home meds: losartan 100mg, amlodipine 10mg, clonidine 0.2mg BID, lasix 10mg Patient reports taking clonidine only once daily - Instructed patient that clonidine should be twice daily - Encouraged regular BP checks at home - RTC in 2 weeks for BP followup with BP log  #DM Home meds: lantus 30u nightly, farxiga 10mg Reports AM fingersticks range between 110-150 POCT A1c 8.5% in office today - Continue current regimen, patient to take fingersticks 3x daily and bring log to next visit in 2 weeks  #medication reconciliation Patient to bring all home medications to next visit in 2 weeks to complete medication reconciliation  #fall Mechanical, no LOC or headstrike No neuro deficits  #CKD Baseline Cr 1.8, most recent CMP (5/2024) with Cr 1.8  - C/w followup with Dr. Pearson  #HLD - C/w lovastatin 40mg

## 2024-06-06 NOTE — HISTORY OF PRESENT ILLNESS
[de-identified] : Ms. Malcolm is an 78 F w/ resistant HTN, HLD, T2DM cb diabetic retinopathy, CKD (baseline Cr 1.8), carotid stenosis (s/p R endarterectomy), HFpEF, Gout, Anemia, presenting to the clinic for BP and DM2 followup. Patient says she has been doing well since her last visit. She did have a fall2 weeks ago that was primarily mechanical - she tripped over an uneven sidewalk and landed on her right side. No loss of consciosness or head strike. Some soreness in the R arm, but improving. Pt also notes some tingling in her L fingertips, but not bothersome.

## 2024-06-06 NOTE — PHYSICAL EXAM
[Normal] : no rash [de-identified] : L cataract [de-identified] : systolic murmur [de-identified] : L fingertip numbness

## 2024-06-12 RX ORDER — CLONIDINE HYDROCHLORIDE 0.2 MG/1
0.2 TABLET ORAL
Qty: 180 | Refills: 3 | Status: ACTIVE | COMMUNITY
Start: 2024-01-29 | End: 1900-01-01

## 2024-06-20 ENCOUNTER — APPOINTMENT (OUTPATIENT)
Dept: INTERNAL MEDICINE | Facility: CLINIC | Age: 79
End: 2024-06-20
Payer: MEDICARE

## 2024-06-20 ENCOUNTER — MED ADMIN CHARGE (OUTPATIENT)
Age: 79
End: 2024-06-20

## 2024-06-20 VITALS
OXYGEN SATURATION: 98 % | HEART RATE: 79 BPM | DIASTOLIC BLOOD PRESSURE: 75 MMHG | RESPIRATION RATE: 14 BRPM | SYSTOLIC BLOOD PRESSURE: 144 MMHG | TEMPERATURE: 97.9 F

## 2024-06-20 DIAGNOSIS — N18.30 CHRONIC KIDNEY DISEASE, STAGE 3 UNSPECIFIED: ICD-10-CM

## 2024-06-20 DIAGNOSIS — I10 ESSENTIAL (PRIMARY) HYPERTENSION: ICD-10-CM

## 2024-06-20 DIAGNOSIS — Z23 ENCOUNTER FOR IMMUNIZATION: ICD-10-CM

## 2024-06-20 DIAGNOSIS — Z00.00 ENCOUNTER FOR GENERAL ADULT MEDICAL EXAMINATION W/OUT ABNORMAL FINDINGS: ICD-10-CM

## 2024-06-20 PROCEDURE — 90750 HZV VACC RECOMBINANT IM: CPT

## 2024-06-20 PROCEDURE — 99397 PER PM REEVAL EST PAT 65+ YR: CPT | Mod: 25

## 2024-06-20 PROCEDURE — 90471 IMMUNIZATION ADMIN: CPT

## 2024-06-20 RX ORDER — DAPAGLIFLOZIN 10 MG/1
10 TABLET, FILM COATED ORAL DAILY
Qty: 90 | Refills: 3 | Status: ACTIVE | COMMUNITY
Start: 2024-06-06

## 2024-06-20 RX ORDER — LOSARTAN POTASSIUM 100 MG/1
100 TABLET, FILM COATED ORAL
Qty: 90 | Refills: 3 | Status: ACTIVE | COMMUNITY
Start: 2023-06-16

## 2024-06-20 RX ORDER — FUROSEMIDE 20 MG/1
20 TABLET ORAL DAILY
Qty: 45 | Refills: 0 | Status: ACTIVE | COMMUNITY
Start: 2024-03-19

## 2024-06-20 NOTE — PHYSICAL EXAM
[No Acute Distress] : no acute distress [Well Nourished] : well nourished [Well Developed] : well developed [Well-Appearing] : well-appearing [Normal Sclera/Conjunctiva] : normal sclera/conjunctiva [PERRL] : pupils equal round and reactive to light [EOMI] : extraocular movements intact [Normal Outer Ear/Nose] : the outer ears and nose were normal in appearance [Normal Oropharynx] : the oropharynx was normal [No JVD] : no jugular venous distention [No Lymphadenopathy] : no lymphadenopathy [Supple] : supple [Thyroid Normal, No Nodules] : the thyroid was normal and there were no nodules present [No Respiratory Distress] : no respiratory distress  [No Accessory Muscle Use] : no accessory muscle use [Clear to Auscultation] : lungs were clear to auscultation bilaterally [Normal Rate] : normal rate  [Regular Rhythm] : with a regular rhythm [Normal S1, S2] : normal S1 and S2 [No Carotid Bruits] : no carotid bruits [No Abdominal Bruit] : a ~M bruit was not heard ~T in the abdomen [No Varicosities] : no varicosities [Pedal Pulses Present] : the pedal pulses are present [No Edema] : there was no peripheral edema [No Palpable Aorta] : no palpable aorta [No Extremity Clubbing/Cyanosis] : no extremity clubbing/cyanosis [Soft] : abdomen soft [Non Tender] : non-tender [Non-distended] : non-distended [No Masses] : no abdominal mass palpated [No HSM] : no HSM [Normal Bowel Sounds] : normal bowel sounds [Normal Posterior Cervical Nodes] : no posterior cervical lymphadenopathy [Normal Anterior Cervical Nodes] : no anterior cervical lymphadenopathy [No CVA Tenderness] : no CVA  tenderness [No Spinal Tenderness] : no spinal tenderness [No Joint Swelling] : no joint swelling [Grossly Normal Strength/Tone] : grossly normal strength/tone [No Rash] : no rash [Coordination Grossly Intact] : coordination grossly intact [No Focal Deficits] : no focal deficits [Normal Gait] : normal gait [Deep Tendon Reflexes (DTR)] : deep tendon reflexes were 2+ and symmetric [Normal Affect] : the affect was normal [Normal Insight/Judgement] : insight and judgment were intact [de-identified] : 2/6 LESLEE REES

## 2024-06-20 NOTE — ASSESSMENT
[FreeTextEntry1] : Ms. Malcolm is an 78 F w/ resistant HTN, HLD, T2DM cb diabetic retinopathy, CKD (baseline Cr 1.8), carotid stenosis (s/p R endarterectomy), HFpEF, Gout, Anemia, presenting to the clinic for BP follow up after increasing clonidine from 1 daily to BID.   #HTN BP better today, tolerating the medications well.  Home meds: losartan 100mg, amlodipine 10mg, clonidine 0.2mg BID, lasix 10mg - c/w current regimen   #DM Home meds: lantus 30u nightly, farxiga 10mg Reports AM fingersticks range between 110-150 POCT A1c 8.5% in office today - Continue current regimen, patient to take fingersticks 3x daily and bring log to next visit  #fall Mechanical, no LOC or headstrike (1 month ago, no new issues)  No neuro deficits  #CKD Baseline Cr 1.8, most recent CMP (5/2024) with Cr 1.8 - C/w followup with Dr. Pearson in August   #HLD - C/w lovastatin 40mg  Patient seen with Dr. Dallas Guzman, DO Medicine Resident

## 2024-06-20 NOTE — HISTORY OF PRESENT ILLNESS
[FreeTextEntry1] : cpe [de-identified] : Ms. Malcolm is an 78 F w/ resistant HTN, HLD, T2DM cb diabetic retinopathy, CKD (baseline Cr 1.8), carotid stenosis (s/p R endarterectomy), HFpEF, Gout, Anemia, presenting to the clinic for BP follow up after increasing clonidine from 1 daily to BID.   The patient reports that she has been feeling well, but that she only picked up the new clonidine prescription 3 days ago because it was not ready at the pharmacy. She has been compliant with the medication since picking it up, and is not experiencing any SE's. She otherwise feels well and has no concerns at this time. She is a very pleasant woman overall.

## 2024-06-20 NOTE — END OF VISIT
[] : Resident [FreeTextEntry3] : 78F w/dm, htn, ckd, hld, carotid stenosis, hfpef, gout, anemia, neuropathy here for CPE, recently had labs done at Dr. Pearson, otherwise feeling well. BP better - now taking clonidine BID since 6/17. 1st shingles vaccine today

## 2024-06-24 NOTE — H&P ADULT - NSICDXPASTSURGICALHX_GEN_ALL_CORE_FT
Report to Medardo HARRISON PAST SURGICAL HISTORY:  Carotid stenosis     H/O total hysterectomy     Uterine fibroid

## 2024-07-09 ENCOUNTER — APPOINTMENT (OUTPATIENT)
Dept: INTERNAL MEDICINE | Facility: CLINIC | Age: 79
End: 2024-07-09

## 2024-08-09 NOTE — ED ADULT NURSE NOTE - CHPI ED NUR RELIEVING FX
Detail Level: Detailed Quality 226: Preventive Care And Screening: Tobacco Use: Screening And Cessation Intervention: Patient screened for tobacco use and is an ex/non-smoker none

## 2024-08-20 ENCOUNTER — APPOINTMENT (OUTPATIENT)
Dept: INTERNAL MEDICINE | Facility: CLINIC | Age: 79
End: 2024-08-20

## 2024-08-20 ENCOUNTER — NON-APPOINTMENT (OUTPATIENT)
Age: 79
End: 2024-08-20

## 2024-08-20 VITALS
SYSTOLIC BLOOD PRESSURE: 152 MMHG | HEART RATE: 76 BPM | DIASTOLIC BLOOD PRESSURE: 77 MMHG | TEMPERATURE: 97.4 F | OXYGEN SATURATION: 96 %

## 2024-08-20 PROCEDURE — 99215 OFFICE O/P EST HI 40 MIN: CPT

## 2024-08-20 PROCEDURE — G2211 COMPLEX E/M VISIT ADD ON: CPT

## 2024-08-20 RX ORDER — ACETAMINOPHEN 500 MG/1
500 TABLET ORAL
Qty: 30 | Refills: 0 | Status: ACTIVE | COMMUNITY
Start: 2024-08-20 | End: 1900-01-01

## 2024-08-20 RX ORDER — LIDOCAINE 40 MG/G
4 PATCH TOPICAL
Qty: 30 | Refills: 0 | Status: ACTIVE | COMMUNITY
Start: 2024-08-20 | End: 1900-01-01

## 2024-08-22 NOTE — REVIEW OF SYSTEMS
[Vision Problems] : vision problems [Joint Pain] : joint pain [Joint Swelling] : joint swelling [Fever] : no fever [Chills] : no chills [Night Sweats] : no night sweats [Recent Change In Weight] : ~T no recent weight change [Discharge] : no discharge [Pain] : no pain [Redness] : no redness [Earache] : no earache [Hearing Loss] : no hearing loss [Sore Throat] : no sore throat [Chest Pain] : no chest pain [Palpitations] : no palpitations [Orthopnea] : no orthopnea [Paroxysmal Nocturnal Dyspnea] : no paroxysmal nocturnal dyspnea [Wheezing] : no wheezing [Cough] : no cough [Abdominal Pain] : no abdominal pain [Nausea] : no nausea [Constipation] : no constipation [Vomiting] : no vomiting [Heartburn] : no heartburn [Melena] : no melena [Dysuria] : no dysuria [Incontinence] : no incontinence [Hematuria] : no hematuria [Muscle Pain] : no muscle pain [Back Pain] : no back pain [Itching] : no itching [Skin Rash] : no skin rash [Headache] : no headache [Fainting] : no fainting [Confusion] : no confusion [Memory Loss] : no memory loss [Suicidal] : not suicidal [Easy Bleeding] : no easy bleeding [Easy Bruising] : no easy bruising [FreeTextEntry3] : Declining vision on L eye

## 2024-08-22 NOTE — PLAN
[FreeTextEntry1] : # R wrist pain Has 2 week hx of R wrist pain/edema that started after mechanical fall. R wrist without erythema or warmth, strength/sensation intact. However has tenderness to palpation at base of 2nd thumb and edema of R wrist (likely post-injury inflammation)   Plan:  -Conservative treatment with Acetaminophen, lidocaine patch and reassess in 1 week   #Anterior Tibia Pain and R toe Pain Started after fall 2 weeks ago. Wonders if the toe pain is related to gout as the pain is similar to gout flares and she typically gets gout attacks on this toe. However, there is no edema, erythema or warmth at the toe and pain started after fall and has been persistent.  Low suspicion for gout, likely 2/2 fall   Plan:  -Trial of Acetaminophen and Lidocaine Patch  -RTC in 1 week to reassess pain   #Cataract #Diabetic retinopathy  Pt requesting opthlalmology referral for cataract removal of L eye, which she reports was dx many years ago but she decline surgery at that time. Reports progressively declining vision on L eye, but no vision problems on R eye. She has not been evaluated by ophthalmology for many years   Plan:  -ophthalmology referral today   RTC in 1 week to reassess wrist, anterior tibia and toe pain   Patient seen and evaluated with Dr. Florence

## 2024-08-22 NOTE — ASSESSMENT
[FreeTextEntry1] : Ms. Malcolm is an 78 F w/ resistant HTN, HLD, T2DM cb diabetic retinopathy, CKD (baseline Cr 1.8), carotid stenosis (s/p R endarterectomy), HFpEF, Gout, Anemia, presenting to the clinic for evaluation of 2 weeks hx of R anterior tibial pain, 2nd toe pain, R wrist pain  after a fall and request for ophthalmology referral for cataract removal.

## 2024-08-22 NOTE — PHYSICAL EXAM
[No Acute Distress] : no acute distress [Well Nourished] : well nourished [Well Developed] : well developed [Normal Sclera/Conjunctiva] : normal sclera/conjunctiva [PERRL] : pupils equal round and reactive to light [EOMI] : extraocular movements intact [Normal Outer Ear/Nose] : the outer ears and nose were normal in appearance [Normal Oropharynx] : the oropharynx was normal [No Lymphadenopathy] : no lymphadenopathy [Supple] : supple [No Respiratory Distress] : no respiratory distress  [No Accessory Muscle Use] : no accessory muscle use [Clear to Auscultation] : lungs were clear to auscultation bilaterally [Normal Rate] : normal rate  [Regular Rhythm] : with a regular rhythm [Normal S1, S2] : normal S1 and S2 [No Edema] : there was no peripheral edema [No Extremity Clubbing/Cyanosis] : no extremity clubbing/cyanosis [Soft] : abdomen soft [Non Tender] : non-tender [Non-distended] : non-distended [Normal Posterior Cervical Nodes] : no posterior cervical lymphadenopathy [Normal Anterior Cervical Nodes] : no anterior cervical lymphadenopathy [No CVA Tenderness] : no CVA  tenderness [No Spinal Tenderness] : no spinal tenderness [Grossly Normal Strength/Tone] : grossly normal strength/tone [No Rash] : no rash [No Focal Deficits] : no focal deficits [Normal Affect] : the affect was normal [Normal Insight/Judgement] : insight and judgment were intact [de-identified] : R wrist with edema, without erythema or warmth. Has tenderness to palpation on base of R thumb, strength and sensation intact. Also tenderness to palpation over anterior tibia and 2 toe on R foot. No edema, eythema or warmth

## 2024-08-22 NOTE — PHYSICAL EXAM
[No Acute Distress] : no acute distress [Well Nourished] : well nourished [Well Developed] : well developed [Normal Sclera/Conjunctiva] : normal sclera/conjunctiva [PERRL] : pupils equal round and reactive to light [EOMI] : extraocular movements intact [Normal Outer Ear/Nose] : the outer ears and nose were normal in appearance [Normal Oropharynx] : the oropharynx was normal [No Lymphadenopathy] : no lymphadenopathy [Supple] : supple [No Respiratory Distress] : no respiratory distress  [No Accessory Muscle Use] : no accessory muscle use [Clear to Auscultation] : lungs were clear to auscultation bilaterally [Normal Rate] : normal rate  [Regular Rhythm] : with a regular rhythm [Normal S1, S2] : normal S1 and S2 [No Edema] : there was no peripheral edema [No Extremity Clubbing/Cyanosis] : no extremity clubbing/cyanosis [Soft] : abdomen soft [Non Tender] : non-tender [Non-distended] : non-distended [Normal Posterior Cervical Nodes] : no posterior cervical lymphadenopathy [Normal Anterior Cervical Nodes] : no anterior cervical lymphadenopathy [No CVA Tenderness] : no CVA  tenderness [No Spinal Tenderness] : no spinal tenderness [Grossly Normal Strength/Tone] : grossly normal strength/tone [No Rash] : no rash [No Focal Deficits] : no focal deficits [Normal Affect] : the affect was normal [Normal Insight/Judgement] : insight and judgment were intact [de-identified] : R wrist with edema, without erythema or warmth. Has tenderness to palpation on base of R thumb, strength and sensation intact. Also tenderness to palpation over anterior tibia and 2 toe on R foot. No edema, eythema or warmth

## 2024-08-22 NOTE — PHYSICAL EXAM
[No Acute Distress] : no acute distress [Well Nourished] : well nourished [Well Developed] : well developed [Normal Sclera/Conjunctiva] : normal sclera/conjunctiva [PERRL] : pupils equal round and reactive to light [EOMI] : extraocular movements intact [Normal Outer Ear/Nose] : the outer ears and nose were normal in appearance [Normal Oropharynx] : the oropharynx was normal [No Lymphadenopathy] : no lymphadenopathy [Supple] : supple [No Respiratory Distress] : no respiratory distress  [No Accessory Muscle Use] : no accessory muscle use [Clear to Auscultation] : lungs were clear to auscultation bilaterally [Normal Rate] : normal rate  [Regular Rhythm] : with a regular rhythm [Normal S1, S2] : normal S1 and S2 [No Edema] : there was no peripheral edema [No Extremity Clubbing/Cyanosis] : no extremity clubbing/cyanosis [Soft] : abdomen soft [Non Tender] : non-tender [Non-distended] : non-distended [Normal Posterior Cervical Nodes] : no posterior cervical lymphadenopathy [Normal Anterior Cervical Nodes] : no anterior cervical lymphadenopathy [No CVA Tenderness] : no CVA  tenderness [No Spinal Tenderness] : no spinal tenderness [Grossly Normal Strength/Tone] : grossly normal strength/tone [No Rash] : no rash [No Focal Deficits] : no focal deficits [Normal Affect] : the affect was normal [Normal Insight/Judgement] : insight and judgment were intact [de-identified] : R wrist with edema, without erythema or warmth. Has tenderness to palpation on base of R thumb, strength and sensation intact. Also tenderness to palpation over anterior tibia and 2 toe on R foot. No edema, eythema or warmth  none

## 2024-08-27 ENCOUNTER — APPOINTMENT (OUTPATIENT)
Dept: INTERNAL MEDICINE | Facility: CLINIC | Age: 79
End: 2024-08-27

## 2024-08-27 ENCOUNTER — MED ADMIN CHARGE (OUTPATIENT)
Age: 79
End: 2024-08-27

## 2024-08-27 VITALS
DIASTOLIC BLOOD PRESSURE: 69 MMHG | TEMPERATURE: 97.3 F | BODY MASS INDEX: 29.02 KG/M2 | SYSTOLIC BLOOD PRESSURE: 135 MMHG | OXYGEN SATURATION: 100 % | HEART RATE: 67 BPM | WEIGHT: 170 LBS | HEIGHT: 64 IN

## 2024-08-27 DIAGNOSIS — Z23 ENCOUNTER FOR IMMUNIZATION: ICD-10-CM

## 2024-08-27 DIAGNOSIS — M25.539 PAIN IN UNSPECIFIED WRIST: ICD-10-CM

## 2024-08-27 DIAGNOSIS — M79.604 PAIN IN RIGHT LEG: ICD-10-CM

## 2024-08-27 DIAGNOSIS — N18.30 CHRONIC KIDNEY DISEASE, STAGE 3 UNSPECIFIED: ICD-10-CM

## 2024-08-27 DIAGNOSIS — M79.605 PAIN IN RIGHT LEG: ICD-10-CM

## 2024-08-27 PROCEDURE — 90715 TDAP VACCINE 7 YRS/> IM: CPT

## 2024-08-27 PROCEDURE — 99214 OFFICE O/P EST MOD 30 MIN: CPT | Mod: 25

## 2024-08-27 PROCEDURE — G2211 COMPLEX E/M VISIT ADD ON: CPT | Mod: NC

## 2024-08-27 PROCEDURE — 90471 IMMUNIZATION ADMIN: CPT

## 2024-08-30 PROBLEM — M79.604 PAIN IN BOTH LOWER EXTREMITIES: Status: ACTIVE | Noted: 2024-08-20

## 2024-08-30 NOTE — ASSESSMENT
[FreeTextEntry1] : Ms. Maloclm is an 79 F w/ resistant HTN, HLD, T2DM cb diabetic retinopathy, CKD (baseline Cr 1.8), carotid stenosis (s/p R endarterectomy), HFpEF, Gout, Anemia, presenting to the clinic for follow up on R wrist pain, R LE pain and R second toe pain after fall.

## 2024-08-30 NOTE — PHYSICAL EXAM
[No Acute Distress] : no acute distress [Well Nourished] : well nourished [Well Developed] : well developed [Normal Sclera/Conjunctiva] : normal sclera/conjunctiva [PERRL] : pupils equal round and reactive to light [EOMI] : extraocular movements intact [Normal Outer Ear/Nose] : the outer ears and nose were normal in appearance [Normal Oropharynx] : the oropharynx was normal [No JVD] : no jugular venous distention [No Lymphadenopathy] : no lymphadenopathy [Supple] : supple [No Respiratory Distress] : no respiratory distress  [No Accessory Muscle Use] : no accessory muscle use [Clear to Auscultation] : lungs were clear to auscultation bilaterally [Normal Rate] : normal rate  [Regular Rhythm] : with a regular rhythm [Normal S1, S2] : normal S1 and S2 [No Edema] : there was no peripheral edema [No Extremity Clubbing/Cyanosis] : no extremity clubbing/cyanosis [Soft] : abdomen soft [Non Tender] : non-tender [Non-distended] : non-distended [Normal Bowel Sounds] : normal bowel sounds [Normal Posterior Cervical Nodes] : no posterior cervical lymphadenopathy [Normal Anterior Cervical Nodes] : no anterior cervical lymphadenopathy [No CVA Tenderness] : no CVA  tenderness [No Spinal Tenderness] : no spinal tenderness [No Rash] : no rash [No Focal Deficits] : no focal deficits [Normal Gait] : normal gait [Normal Affect] : the affect was normal [Normal Insight/Judgement] : insight and judgment were intact [de-identified] : Bilateral wrist dorsal edema, non-pitting (stable since last week), No erythema, Pain to palpation of R wrist. Mild R calf tenderness, no LE edema, erythema. No pain on palpation of R toes

## 2024-08-30 NOTE — ASSESSMENT
[FreeTextEntry1] : Ms. Malcolm is an 79 F w/ resistant HTN, HLD, T2DM cb diabetic retinopathy, CKD (baseline Cr 1.8), carotid stenosis (s/p R endarterectomy), HFpEF, Gout, Anemia, presenting to the clinic for follow up on R wrist pain, R LE pain and R second toe pain after fall.

## 2024-08-30 NOTE — HISTORY OF PRESENT ILLNESS
[de-identified] : Ms. Malcolm is an 79 F w/ resistant HTN, HLD, T2DM cb diabetic retinopathy, CKD (baseline Cr 1.8), carotid stenosis (s/p R endarterectomy), HFpEF, Gout, Anemia, presenting to the clinic for follow up on R wrist pain, R LE pain and R second toe pain after fall. Last week, we opted for conservative management with Tylenol and Lidocaine given low concern for fracture with plan for close follow up.  Today patient reports her pain in the leg is significantly improved. The anterior tibial pain is resolved, she endorses some calf pain, that she says is also improved since the fall. In addition, toe pain is completely resolved. The wrist pain is still bothering her, but overall improved. She is able to get through her daily activities eating, cleaning, walking her dog x3 per day, but does experience pain after some activity on her wrist as well as her leg.  Of note, patient was evaluated in Sept 2023, for acute on chronic martha wrist pain (not associated with injury), an Xray was done which showed no fractures, but there were ligament injury martha and she was referrred to ortho at that time. She states she did not follow up. In addition, notes pain this time is different.

## 2024-08-30 NOTE — REVIEW OF SYSTEMS
[Vision Problems] : vision problems [Joint Pain] : joint pain [Fever] : no fever [Chills] : no chills [Night Sweats] : no night sweats [Discharge] : no discharge [Hearing Loss] : no hearing loss [Nasal Discharge] : no nasal discharge [Sore Throat] : no sore throat [Chest Pain] : no chest pain [Palpitations] : no palpitations [Orthopnea] : no orthopnea [Paroxysmal Nocturnal Dyspnea] : no paroxysmal nocturnal dyspnea [Shortness Of Breath] : no shortness of breath [Wheezing] : no wheezing [Cough] : no cough [Abdominal Pain] : no abdominal pain [Nausea] : no nausea [Vomiting] : no vomiting [Heartburn] : no heartburn [Dysuria] : no dysuria [Incontinence] : no incontinence [Hematuria] : no hematuria [Frequency] : no frequency [Joint Stiffness] : no joint stiffness [Muscle Pain] : no muscle pain [Back Pain] : no back pain [Itching] : no itching [Skin Rash] : no skin rash [Headache] : no headache [Memory Loss] : no memory loss [Insomnia] : no insomnia [Anxiety] : no anxiety [Depression] : no depression [Easy Bruising] : no easy bruising [FreeTextEntry3] : L eye with known cataract

## 2024-08-30 NOTE — HISTORY OF PRESENT ILLNESS
[de-identified] : Ms. Malcolm is an 79 F w/ resistant HTN, HLD, T2DM cb diabetic retinopathy, CKD (baseline Cr 1.8), carotid stenosis (s/p R endarterectomy), HFpEF, Gout, Anemia, presenting to the clinic for follow up on R wrist pain, R LE pain and R second toe pain after fall. Last week, we opted for conservative management with Tylenol and Lidocaine given low concern for fracture with plan for close follow up.  Today patient reports her pain in the leg is significantly improved. The anterior tibial pain is resolved, she endorses some calf pain, that she says is also improved since the fall. In addition, toe pain is completely resolved. The wrist pain is still bothering her, but overall improved. She is able to get through her daily activities eating, cleaning, walking her dog x3 per day, but does experience pain after some activity on her wrist as well as her leg.  Of note, patient was evaluated in Sept 2023, for acute on chronic martha wrist pain (not associated with injury), an Xray was done which showed no fractures, but there were ligament injury martha and she was referrred to ortho at that time. She states she did not follow up. In addition, notes pain this time is different.

## 2024-08-30 NOTE — PLAN
[FreeTextEntry1] : #R wrist pain 3 week hx of R wrist pain/edema that started after mechanical fall. R wrist without erythema or warmth, strength/sensation intact. However has tenderness to palpation at base of 2nd thumb and edema of R wrist (likely post-injury inflammation). s/p trial of coservative management with Tylenol and lidocaine patch, ice pack, epsom salt soaking. Reports overall improved. is able to complete daily activites, but still has some pain.  Of note, she was evaluated in Sept 2023, for acute on chronic martha wrist pain (not associated with injury), an Xray was done which showed no fractures, but there were ligament injury martha and she was referrred to ortho at that time. She states she did not follow up. In addition, notes pain this time is different.   Plan: -Wrist Xray (based on results will consider PT, ortho referral) -Wrist splint -continue with conservative management: Tylenol, lidocaine patch, epsom salt soaking, ice packs  #RLE pain Reports pain in anterior tibia resolved, However, still has some R calf pain, improved since fall. Some tenderness to palpation of R calf.  Low concern for DTV: As patient is very active: walks dog x3/day. no edema, erythema and overall improved  Plan:  -conservative treatment as above  #R nd toe pain  Pain has now resolved. No erythema, edema or ulcerations and no tenderness on palpation   Plan:  -Recommended follow up with podiatry (which patient says she sees frequently, most recelty about 1 month ago)   #HCM -Dexa scan (never had one)  -Mammography 05/2024 (wnl) -Tdap given in clinc today -PCV 2023, shingrix on 6/2024  RTC in 6 weeks, earlier PRN   Patient seen and evaluated with Dr. Montgomery

## 2024-09-10 ENCOUNTER — APPOINTMENT (OUTPATIENT)
Dept: INTERNAL MEDICINE | Facility: CLINIC | Age: 79
End: 2024-09-10

## 2024-10-01 ENCOUNTER — NON-APPOINTMENT (OUTPATIENT)
Age: 79
End: 2024-10-01

## 2024-10-01 ENCOUNTER — APPOINTMENT (OUTPATIENT)
Dept: OPHTHALMOLOGY | Facility: CLINIC | Age: 79
End: 2024-10-01
Payer: MEDICARE

## 2024-10-01 PROCEDURE — 92004 COMPRE OPH EXAM NEW PT 1/>: CPT

## 2024-10-01 PROCEDURE — 92134 CPTRZ OPH DX IMG PST SGM RTA: CPT

## 2024-10-08 NOTE — ED ADULT NURSE NOTE - NSSUHOSCREENINGYN_ED_ALL_ED
Heather Hernandez Patient Age: 62 year old  MESSAGE: Interpreting service used: No    Insurance on file confirmed with caller: Yes    IM/FP- Heather is scheduled for a follow up appointment for cholesterol on 10-30-24.  She would like to know if she can get her pap smear done that day as well.    Message read back to caller for accuracy: Yes       ALLERGIES:  Patient has no known allergies.  Current Outpatient Medications   Medication Sig Dispense Refill    fluconazole (DIFLUCAN) 150 MG tablet Take one tablet today, and repeat in 3 days. 2 tablet 0    HYDROcodone-acetaminophen (NORCO) 5-325 MG per tablet Take 1 tablet by mouth every 8 hours as needed for Pain. 10 tablet 0    docusate sodium-sennosides (SENOKOT S) 50-8.6 MG per tablet Take 1 tablet by mouth in the morning and 1 tablet in the evening. 60 tablet 1    disulfiram (ANTABUSE) 250 MG tablet TAKE 1 TABLET DAILY (Patient taking differently: Take 250 mg by mouth every morning.) 90 tablet 1    albuterol 108 (90 Base) MCG/ACT inhaler Inhale 2 puffs into the lungs every 4 hours as needed for Shortness of Breath or Wheezing. 3 each 3    buPROPion XL (WELLBUTRIN XL) 150 MG 24 hr tablet Take 1 tablet by mouth daily. (Patient taking differently: Take 150 mg by mouth every morning.) 90 tablet 3    diclofenac (VOLTAREN) 1 % gel Apply 4 g topically 4 times daily as needed (apply to leg and feet). 350 g 3    CALCIUM PO Take 1 tablet by mouth every morning.      Melatonin 10 MG Tab Take 30 mg by mouth at bedtime as needed.      Multiple Vitamins-Minerals (Multivitamin Adults 50+) Tab Take by mouth every morning.       No current facility-administered medications for this visit.     PHARMACY to use:           Pharmacy preference(s) on file:   [facility] - [address]  [address] [facility] [address]  Phone: [phone] Fax: [phone]    [id number] [address] AT [facility] [address]  [address] [place] [address]  [address] [facility] [address]  Phone: [phone] Fax: [phone]      CALL BACK INFO: Ok to leave response (including medical information) on answering machine      PCP: Timur Dean MD         INS: Payor: [employer] / Plan: OPTIONS PPO/2400 / Product Type: PPO MISC   PATIENT ADDRESS:  [address] [place] [id number]       Yes - the patient is able to be screened

## 2024-10-15 ENCOUNTER — APPOINTMENT (OUTPATIENT)
Dept: INTERNAL MEDICINE | Facility: CLINIC | Age: 79
End: 2024-10-15
Payer: MEDICARE

## 2024-10-15 ENCOUNTER — RESULT CHARGE (OUTPATIENT)
Age: 79
End: 2024-10-15

## 2024-10-15 VITALS
DIASTOLIC BLOOD PRESSURE: 72 MMHG | BODY MASS INDEX: 31.41 KG/M2 | WEIGHT: 184 LBS | SYSTOLIC BLOOD PRESSURE: 146 MMHG | HEART RATE: 78 BPM | HEIGHT: 64 IN | OXYGEN SATURATION: 97 % | TEMPERATURE: 97.6 F

## 2024-10-15 VITALS — SYSTOLIC BLOOD PRESSURE: 153 MMHG | DIASTOLIC BLOOD PRESSURE: 64 MMHG

## 2024-10-15 DIAGNOSIS — E11.65 TYPE 2 DIABETES MELLITUS WITH HYPERGLYCEMIA: ICD-10-CM

## 2024-10-15 DIAGNOSIS — E11.9 TYPE 2 DIABETES MELLITUS W/OUT COMPLICATIONS: ICD-10-CM

## 2024-10-15 DIAGNOSIS — I10 ESSENTIAL (PRIMARY) HYPERTENSION: ICD-10-CM

## 2024-10-15 PROCEDURE — G2211 COMPLEX E/M VISIT ADD ON: CPT

## 2024-10-15 PROCEDURE — 99214 OFFICE O/P EST MOD 30 MIN: CPT | Mod: GC

## 2024-10-16 PROBLEM — E11.65 UNCONTROLLED DIABETES MELLITUS WITH HYPERGLYCEMIA: Status: ACTIVE | Noted: 2024-10-16

## 2024-10-28 ENCOUNTER — APPOINTMENT (OUTPATIENT)
Dept: INTERNAL MEDICINE | Facility: CLINIC | Age: 79
End: 2024-10-28
Payer: MEDICARE

## 2024-10-28 ENCOUNTER — NON-APPOINTMENT (OUTPATIENT)
Age: 79
End: 2024-10-28

## 2024-10-28 VITALS
SYSTOLIC BLOOD PRESSURE: 124 MMHG | DIASTOLIC BLOOD PRESSURE: 69 MMHG | HEART RATE: 81 BPM | OXYGEN SATURATION: 96 % | WEIGHT: 185 LBS | HEIGHT: 64 IN | BODY MASS INDEX: 31.58 KG/M2 | TEMPERATURE: 97.2 F

## 2024-10-28 DIAGNOSIS — I10 ESSENTIAL (PRIMARY) HYPERTENSION: ICD-10-CM

## 2024-10-28 DIAGNOSIS — E11.65 TYPE 2 DIABETES MELLITUS WITH HYPERGLYCEMIA: ICD-10-CM

## 2024-10-28 PROCEDURE — G2211 COMPLEX E/M VISIT ADD ON: CPT

## 2024-10-28 PROCEDURE — 99214 OFFICE O/P EST MOD 30 MIN: CPT

## 2024-10-29 ENCOUNTER — APPOINTMENT (OUTPATIENT)
Dept: INTERNAL MEDICINE | Facility: CLINIC | Age: 79
End: 2024-10-29

## 2024-11-04 ENCOUNTER — APPOINTMENT (OUTPATIENT)
Dept: INTERNAL MEDICINE | Facility: CLINIC | Age: 79
End: 2024-11-04
Payer: MEDICARE

## 2024-11-04 VITALS
SYSTOLIC BLOOD PRESSURE: 156 MMHG | BODY MASS INDEX: 31.58 KG/M2 | TEMPERATURE: 97.1 F | DIASTOLIC BLOOD PRESSURE: 76 MMHG | OXYGEN SATURATION: 98 % | HEART RATE: 62 BPM | HEIGHT: 64 IN | WEIGHT: 185 LBS

## 2024-11-04 VITALS — DIASTOLIC BLOOD PRESSURE: 80 MMHG | SYSTOLIC BLOOD PRESSURE: 170 MMHG

## 2024-11-04 DIAGNOSIS — N18.30 CHRONIC KIDNEY DISEASE, STAGE 3 UNSPECIFIED: ICD-10-CM

## 2024-11-04 DIAGNOSIS — E11.9 TYPE 2 DIABETES MELLITUS W/OUT COMPLICATIONS: ICD-10-CM

## 2024-11-04 DIAGNOSIS — I50.30 UNSPECIFIED DIASTOLIC (CONGESTIVE) HEART FAILURE: ICD-10-CM

## 2024-11-04 PROCEDURE — G2211 COMPLEX E/M VISIT ADD ON: CPT

## 2024-11-04 PROCEDURE — 99214 OFFICE O/P EST MOD 30 MIN: CPT

## 2024-11-05 RX ORDER — SEMAGLUTIDE 0.68 MG/ML
2 INJECTION, SOLUTION SUBCUTANEOUS
Qty: 1 | Refills: 0 | Status: ACTIVE | COMMUNITY
Start: 2024-11-04 | End: 1900-01-01

## 2024-11-07 ENCOUNTER — APPOINTMENT (OUTPATIENT)
Dept: INTERNAL MEDICINE | Facility: CLINIC | Age: 79
End: 2024-11-07
Payer: MEDICARE

## 2024-11-07 PROCEDURE — 99211 OFF/OP EST MAY X REQ PHY/QHP: CPT

## 2024-11-11 ENCOUNTER — APPOINTMENT (OUTPATIENT)
Dept: INTERNAL MEDICINE | Facility: CLINIC | Age: 79
End: 2024-11-11

## 2024-11-12 ENCOUNTER — APPOINTMENT (OUTPATIENT)
Dept: VASCULAR SURGERY | Facility: CLINIC | Age: 79
End: 2024-11-12

## 2024-11-12 VITALS
WEIGHT: 185 LBS | SYSTOLIC BLOOD PRESSURE: 162 MMHG | DIASTOLIC BLOOD PRESSURE: 79 MMHG | HEIGHT: 64 IN | HEART RATE: 86 BPM | BODY MASS INDEX: 31.58 KG/M2

## 2024-11-12 PROCEDURE — 99213 OFFICE O/P EST LOW 20 MIN: CPT

## 2024-11-12 PROCEDURE — 93880 EXTRACRANIAL BILAT STUDY: CPT

## 2024-11-14 ENCOUNTER — APPOINTMENT (OUTPATIENT)
Dept: OPHTHALMOLOGY | Facility: CLINIC | Age: 79
End: 2024-11-14
Payer: MEDICARE

## 2024-11-14 ENCOUNTER — NON-APPOINTMENT (OUTPATIENT)
Age: 79
End: 2024-11-14

## 2024-11-14 PROCEDURE — 92014 COMPRE OPH EXAM EST PT 1/>: CPT

## 2024-11-14 PROCEDURE — 92136 OPHTHALMIC BIOMETRY: CPT

## 2024-11-14 PROCEDURE — 76512 OPH US DX B-SCAN: CPT | Mod: LT

## 2024-12-03 ENCOUNTER — APPOINTMENT (OUTPATIENT)
Dept: INTERNAL MEDICINE | Facility: CLINIC | Age: 79
End: 2024-12-03

## 2024-12-16 ENCOUNTER — APPOINTMENT (OUTPATIENT)
Dept: INTERNAL MEDICINE | Facility: CLINIC | Age: 79
End: 2024-12-16
Payer: MEDICARE

## 2024-12-16 VITALS
SYSTOLIC BLOOD PRESSURE: 154 MMHG | WEIGHT: 176 LBS | TEMPERATURE: 98 F | OXYGEN SATURATION: 99 % | DIASTOLIC BLOOD PRESSURE: 74 MMHG | HEIGHT: 64 IN | BODY MASS INDEX: 30.05 KG/M2 | HEART RATE: 85 BPM | RESPIRATION RATE: 14 BRPM

## 2024-12-16 DIAGNOSIS — N18.30 CHRONIC KIDNEY DISEASE, STAGE 3 UNSPECIFIED: ICD-10-CM

## 2024-12-16 DIAGNOSIS — I10 ESSENTIAL (PRIMARY) HYPERTENSION: ICD-10-CM

## 2024-12-16 DIAGNOSIS — E78.5 HYPERLIPIDEMIA, UNSPECIFIED: ICD-10-CM

## 2024-12-16 DIAGNOSIS — E83.52 HYPERCALCEMIA: ICD-10-CM

## 2024-12-16 DIAGNOSIS — E11.65 TYPE 2 DIABETES MELLITUS WITH HYPERGLYCEMIA: ICD-10-CM

## 2024-12-16 PROCEDURE — 99214 OFFICE O/P EST MOD 30 MIN: CPT | Mod: GC

## 2024-12-16 PROCEDURE — 93000 ELECTROCARDIOGRAM COMPLETE: CPT

## 2024-12-16 PROCEDURE — G2211 COMPLEX E/M VISIT ADD ON: CPT

## 2024-12-24 ENCOUNTER — NON-APPOINTMENT (OUTPATIENT)
Age: 79
End: 2024-12-24

## 2025-01-06 ENCOUNTER — APPOINTMENT (OUTPATIENT)
Dept: INTERNAL MEDICINE | Facility: CLINIC | Age: 80
End: 2025-01-06
Payer: MEDICARE

## 2025-01-06 VITALS
OXYGEN SATURATION: 97 % | HEART RATE: 88 BPM | TEMPERATURE: 97.3 F | SYSTOLIC BLOOD PRESSURE: 158 MMHG | WEIGHT: 177 LBS | DIASTOLIC BLOOD PRESSURE: 73 MMHG | BODY MASS INDEX: 30.22 KG/M2 | HEIGHT: 64 IN

## 2025-01-06 DIAGNOSIS — N18.30 CHRONIC KIDNEY DISEASE, STAGE 3 UNSPECIFIED: ICD-10-CM

## 2025-01-06 DIAGNOSIS — I10 ESSENTIAL (PRIMARY) HYPERTENSION: ICD-10-CM

## 2025-01-06 DIAGNOSIS — I65.23 OCCLUSION AND STENOSIS OF BILATERAL CAROTID ARTERIES: ICD-10-CM

## 2025-01-06 DIAGNOSIS — Z01.818 ENCOUNTER FOR OTHER PREPROCEDURAL EXAMINATION: ICD-10-CM

## 2025-01-06 DIAGNOSIS — I50.30 UNSPECIFIED DIASTOLIC (CONGESTIVE) HEART FAILURE: ICD-10-CM

## 2025-01-06 DIAGNOSIS — D63.8 ANEMIA IN OTHER CHRONIC DISEASES CLASSIFIED ELSEWHERE: ICD-10-CM

## 2025-01-06 PROCEDURE — 99214 OFFICE O/P EST MOD 30 MIN: CPT | Mod: GC,25

## 2025-01-06 PROCEDURE — 36415 COLL VENOUS BLD VENIPUNCTURE: CPT

## 2025-01-07 ENCOUNTER — NON-APPOINTMENT (OUTPATIENT)
Age: 80
End: 2025-01-07

## 2025-01-07 LAB
ANION GAP SERPL CALC-SCNC: 13 MMOL/L
APTT BLD: 33.7 SEC
BASOPHILS # BLD AUTO: 0.09 K/UL
BASOPHILS NFR BLD AUTO: 1.1 %
BUN SERPL-MCNC: 31 MG/DL
CALCIUM SERPL-MCNC: 11.1 MG/DL
CHLORIDE SERPL-SCNC: 107 MMOL/L
CO2 SERPL-SCNC: 25 MMOL/L
CREAT SERPL-MCNC: 1.89 MG/DL
EGFR: 26 ML/MIN/1.73M2
EOSINOPHIL # BLD AUTO: 0.41 K/UL
EOSINOPHIL NFR BLD AUTO: 4.8 %
GLUCOSE SERPL-MCNC: 89 MG/DL
HCT VFR BLD CALC: 35.3 %
HGB BLD-MCNC: 11.3 G/DL
IMM GRANULOCYTES NFR BLD AUTO: 0.2 %
INR PPP: 0.91 RATIO
LYMPHOCYTES # BLD AUTO: 2.67 K/UL
LYMPHOCYTES NFR BLD AUTO: 31.5 %
MAN DIFF?: NORMAL
MCHC RBC-ENTMCNC: 27 PG
MCHC RBC-ENTMCNC: 32 G/DL
MCV RBC AUTO: 84.4 FL
MONOCYTES # BLD AUTO: 0.94 K/UL
MONOCYTES NFR BLD AUTO: 11.1 %
NEUTROPHILS # BLD AUTO: 4.35 K/UL
NEUTROPHILS NFR BLD AUTO: 51.3 %
PLATELET # BLD AUTO: 214 K/UL
POTASSIUM SERPL-SCNC: 5.4 MMOL/L
PT BLD: 10.7 SEC
RBC # BLD: 4.18 M/UL
RBC # FLD: 17.1 %
SODIUM SERPL-SCNC: 145 MMOL/L
WBC # FLD AUTO: 8.48 K/UL

## 2025-01-21 ENCOUNTER — APPOINTMENT (OUTPATIENT)
Dept: NEPHROLOGY | Facility: CLINIC | Age: 80
End: 2025-01-21
Payer: MEDICARE

## 2025-01-21 VITALS
HEART RATE: 86 BPM | BODY MASS INDEX: 30.38 KG/M2 | DIASTOLIC BLOOD PRESSURE: 76 MMHG | SYSTOLIC BLOOD PRESSURE: 156 MMHG | WEIGHT: 177 LBS

## 2025-01-21 DIAGNOSIS — I50.30 UNSPECIFIED DIASTOLIC (CONGESTIVE) HEART FAILURE: ICD-10-CM

## 2025-01-21 DIAGNOSIS — I10 ESSENTIAL (PRIMARY) HYPERTENSION: ICD-10-CM

## 2025-01-21 DIAGNOSIS — N18.4 CHRONIC KIDNEY DISEASE, STAGE 4 (SEVERE): ICD-10-CM

## 2025-01-21 DIAGNOSIS — E87.5 HYPERKALEMIA: ICD-10-CM

## 2025-01-21 PROCEDURE — G2211 COMPLEX E/M VISIT ADD ON: CPT

## 2025-01-21 PROCEDURE — 99214 OFFICE O/P EST MOD 30 MIN: CPT

## 2025-01-21 PROCEDURE — 99204 OFFICE O/P NEW MOD 45 MIN: CPT

## 2025-01-21 RX ORDER — FENOFIBRIC ACID 45 MG/1
45 CAPSULE, DELAYED RELEASE ORAL
Refills: 0 | Status: ACTIVE | COMMUNITY

## 2025-01-21 RX ORDER — METOPROLOL SUCCINATE 50 MG/1
50 TABLET, EXTENDED RELEASE ORAL
Refills: 0 | Status: ACTIVE | COMMUNITY

## 2025-01-21 RX ORDER — ALLOPURINOL 100 MG/1
100 TABLET ORAL
Refills: 0 | Status: ACTIVE | COMMUNITY

## 2025-01-22 LAB
APPEARANCE: CLEAR
BACTERIA: ABNORMAL /HPF
BILIRUBIN URINE: NEGATIVE
BLOOD URINE: NEGATIVE
CAST: 0 /LPF
COLOR: YELLOW
CREAT SPEC-SCNC: 105 MG/DL
CREAT SPEC-SCNC: 105 MG/DL
CREAT/PROT UR: 0.4 RATIO
EPITHELIAL CELLS: 5 /HPF
GLUCOSE QUALITATIVE U: >=1000 MG/DL
KETONES URINE: NEGATIVE MG/DL
LEUKOCYTE ESTERASE URINE: ABNORMAL
MICROALBUMIN 24H UR DL<=1MG/L-MCNC: 20.3 MG/DL
MICROALBUMIN/CREAT 24H UR-RTO: 192 MG/G
MICROSCOPIC-UA: NORMAL
NITRITE URINE: NEGATIVE
PH URINE: 5.5
PROT UR-MCNC: 41 MG/DL
PROTEIN URINE: 30 MG/DL
RED BLOOD CELLS URINE: 1 /HPF
SPECIFIC GRAVITY URINE: 1.02
UROBILINOGEN URINE: 0.2 MG/DL
WHITE BLOOD CELLS URINE: 13 /HPF

## 2025-01-23 PROBLEM — N18.4 CKD (CHRONIC KIDNEY DISEASE), STAGE IV: Status: ACTIVE | Noted: 2025-01-23

## 2025-01-27 NOTE — ASU PATIENT PROFILE, ADULT - NSICDXPASTMEDICALHX_GEN_ALL_CORE_FT
PAST MEDICAL HISTORY:  Diabetes type 2    H/O heart failure     Hypercalcemia     Hyperlipidemia     Hypertension

## 2025-01-27 NOTE — ASU PATIENT PROFILE, ADULT - NSICDXPASTSURGICALHX_GEN_ALL_CORE_FT
PAST SURGICAL HISTORY:  Carotid stenosis     H/O endarterectomy Rt    H/O total hysterectomy     Uterine fibroid

## 2025-01-27 NOTE — ASU PATIENT PROFILE, ADULT - FALL HARM RISK - UNIVERSAL INTERVENTIONS
Bed in lowest position, wheels locked, appropriate side rails in place/Call bell, personal items and telephone in reach/Instruct patient to call for assistance before getting out of bed or chair/Non-slip footwear when patient is out of bed/[place] to call system/Physically safe environment - no spills, clutter or unnecessary equipment/Purposeful Proactive Rounding/Room/bathroom lighting operational, light cord in reach

## 2025-01-27 NOTE — ASU PATIENT PROFILE, ADULT - NS PREOP UNDERSTANDS INFO
No solid food or milk products after 12 mid-night 1/27/2025/ water or clear apple juice no later  than 8:30am DOS/ photo ID and insurance card/ comfortable clothing/ daughter will take her home/yes

## 2025-01-28 ENCOUNTER — OUTPATIENT (OUTPATIENT)
Dept: OUTPATIENT SERVICES | Facility: HOSPITAL | Age: 80
LOS: 1 days | Discharge: HOME | End: 2025-01-28

## 2025-01-28 ENCOUNTER — APPOINTMENT (OUTPATIENT)
Dept: OPHTHALMOLOGY | Facility: AMBULATORY SURGERY CENTER | Age: 80
End: 2025-01-28
Payer: MEDICARE

## 2025-01-28 ENCOUNTER — NON-APPOINTMENT (OUTPATIENT)
Age: 80
End: 2025-01-28

## 2025-01-28 VITALS
SYSTOLIC BLOOD PRESSURE: 194 MMHG | HEIGHT: 63 IN | DIASTOLIC BLOOD PRESSURE: 79 MMHG | TEMPERATURE: 99 F | WEIGHT: 176.37 LBS | RESPIRATION RATE: 16 BRPM | HEART RATE: 98 BPM | OXYGEN SATURATION: 100 %

## 2025-01-28 VITALS
RESPIRATION RATE: 16 BRPM | HEART RATE: 81 BPM | DIASTOLIC BLOOD PRESSURE: 63 MMHG | SYSTOLIC BLOOD PRESSURE: 151 MMHG | TEMPERATURE: 98 F | OXYGEN SATURATION: 96 %

## 2025-01-28 DIAGNOSIS — D25.9 LEIOMYOMA OF UTERUS, UNSPECIFIED: Chronic | ICD-10-CM

## 2025-01-28 DIAGNOSIS — Z98.890 OTHER SPECIFIED POSTPROCEDURAL STATES: Chronic | ICD-10-CM

## 2025-01-28 DIAGNOSIS — I65.29 OCCLUSION AND STENOSIS OF UNSPECIFIED CAROTID ARTERY: Chronic | ICD-10-CM

## 2025-01-28 DIAGNOSIS — Z90.710 ACQUIRED ABSENCE OF BOTH CERVIX AND UTERUS: Chronic | ICD-10-CM

## 2025-01-28 PROCEDURE — 66982 XCAPSL CTRC RMVL CPLX WO ECP: CPT | Mod: LT

## 2025-01-28 DEVICE — IMPLANTABLE DEVICE
Type: IMPLANTABLE DEVICE | Site: LEFT EYE | Status: NON-FUNCTIONAL
Removed: 2025-01-28

## 2025-01-28 RX ORDER — SODIUM CHLORIDE 9 G/ML
1000 INJECTION, SOLUTION INTRAVENOUS
Refills: 0 | Status: DISCONTINUED | OUTPATIENT
Start: 2025-01-28 | End: 2025-01-28

## 2025-01-28 RX ORDER — TROPICAMIDE 5 MG/ML
1 SOLUTION/ DROPS OPHTHALMIC
Refills: 0 | Status: DISCONTINUED | OUTPATIENT
Start: 2025-01-28 | End: 2025-01-28

## 2025-01-28 RX ORDER — TETRACAINE HCL 0.5 %
1 DROPS OPHTHALMIC (EYE) ONCE
Refills: 0 | Status: COMPLETED | OUTPATIENT
Start: 2025-01-28 | End: 2025-01-28

## 2025-01-28 RX ORDER — PHENYLEPHRINE HYDROCHLORIDE 25 MG/ML
1 SOLUTION OPHTHALMIC
Refills: 0 | Status: DISCONTINUED | OUTPATIENT
Start: 2025-01-28 | End: 2025-01-28

## 2025-01-28 RX ORDER — PHENYLEPHRINE HYDROCHLORIDE 25 MG/ML
1 SOLUTION OPHTHALMIC
Refills: 0 | Status: COMPLETED | OUTPATIENT
Start: 2025-01-28 | End: 2025-01-28

## 2025-01-28 RX ADMIN — TROPICAMIDE 1 DROP(S): 5 SOLUTION/ DROPS OPHTHALMIC at 11:40

## 2025-01-28 RX ADMIN — PHENYLEPHRINE HYDROCHLORIDE 1 DROP(S): 25 SOLUTION OPHTHALMIC at 11:30

## 2025-01-28 RX ADMIN — Medication 1 DROP(S): at 11:30

## 2025-01-28 RX ADMIN — TROPICAMIDE 1 DROP(S): 5 SOLUTION/ DROPS OPHTHALMIC at 11:30

## 2025-01-28 RX ADMIN — PHENYLEPHRINE HYDROCHLORIDE 1 DROP(S): 25 SOLUTION OPHTHALMIC at 11:40

## 2025-01-28 RX ADMIN — PHENYLEPHRINE HYDROCHLORIDE 1 DROP(S): 25 SOLUTION OPHTHALMIC at 11:35

## 2025-01-28 NOTE — PRE-ANESTHESIA EVALUATION ADULT - NSANTHOSAYNRD_GEN_A_CORE
No. EDIS screening performed.  STOP BANG Legend: 0-2 = LOW Risk; 3-4 = INTERMEDIATE Risk; 5-8 = HIGH Risk

## 2025-01-28 NOTE — PRE-ANESTHESIA EVALUATION ADULT - NSANTHPEFT_GEN_ALL_CORE
Gen: A&Ox4 in NAD with pleasant demeanor  CV: RRR S1/S2 intact  Resp: B/L breath sounds intact breathing comfortably

## 2025-01-28 NOTE — OPERATIVE REPORT - OPERATIVE RPOSRT DETAILS
SURGEON: Anna Campuzano    ASSISTANT: none    PRE-OP DIAGNOSIS: cataract OS    POST-OP DIAGNOSIS: Same    ANESTHESIA: MAC, local (retrobulbar block)    PROCEDURE: cataract extraction with intraocular lens placement, left eye    SPECIMEN/TISSUE REMOVED: None    ESTIMATED BLOOD LOSS: < 1mL    COMPLICATIONS: None    PROCEDURE:    The patient was seen in the preoperative area. The risks, benefits, and alternatives to surgery were discussed. All questions were answered.  The patient was given standard preoperative drops. The patient was then brought to the operating room and 3.5mL of a 50:50 mixture of 4% lidocaine and 0.75% was instilled into the retrobulbar space.  The patient was then prepped and draped in the usual sterile fashion for ophthalmic surgery.    A lid speculum was used to expose the eye.  A paracentesis was created with an MVR blade at 4 o'clock hour from the temporal limbus in the clockwise direction.  Next, 1% Preservative-free Lidocaine was instilled into the anterior chamber.  Due to a poor red reflex, Tripan Blue was used to stain the anterior capsule.  Tripan Blue was washed out using balanced salt solution and the anterior chamber was filled with Healon.  A clear corneal incision was created with the aid of a crescent blade and a 2.4 mm sharped tip keratome at the 2 o'clock position.  A cystotome and Utrata forceps was used to create a continuous curvilinear capsulorrhexis.  Balanced salt solution was used for hydro dissection.  Due to diffuse zonular weakness capsular hooks were placed.  The nucleus was then phacoemulsified and aspirated using a divide and conquer technique.  The remaining epinucleus and cortical material was aspirated from the eye.  The capsular bag was then reformed using provisc in anticipation of the introduction of an intraocular lens implant.  An anterior capsular defect at 10 o'clock was appreciated. A sulcus intraocular lens implant was injected into the sulcus which is an MA60AC +29.5D SN [id number].  After centration, the remaining viscoelastic material was removed using the IA handpiece.    The temporal clear corneal and paracentesis incisions were hydrated with balanced salt solution to achieve adequate watertight closure. The wounds were checked for leaks and found to be negative.    Maxitrol ointment was placed into the fornices and then the eye was patched and shielded.    The patient tolerated the procedure well and was transferred to the recovery room in stable condition. They received standard postoperative instructions and an appointment to follow up the next day.

## 2025-01-29 ENCOUNTER — APPOINTMENT (OUTPATIENT)
Dept: OPHTHALMOLOGY | Facility: CLINIC | Age: 80
End: 2025-01-29
Payer: MEDICARE

## 2025-01-29 ENCOUNTER — NON-APPOINTMENT (OUTPATIENT)
Age: 80
End: 2025-01-29

## 2025-01-29 PROCEDURE — 99024 POSTOP FOLLOW-UP VISIT: CPT

## 2025-01-30 ENCOUNTER — NON-APPOINTMENT (OUTPATIENT)
Age: 80
End: 2025-01-30

## 2025-01-30 ENCOUNTER — APPOINTMENT (OUTPATIENT)
Dept: OPHTHALMOLOGY | Facility: CLINIC | Age: 80
End: 2025-01-30
Payer: MEDICARE

## 2025-01-30 PROCEDURE — 99024 POSTOP FOLLOW-UP VISIT: CPT

## 2025-01-30 NOTE — PRE-OP CHECKLIST - BP NONINVASIVE DIASTOLIC (MM HG)
[facility]   [address], Medical Office [address]   Phone:  [phone]  Fax:  [phone]    Pre Op Testing Appointment: Please go to an [place] lab about a week prior to surgery for blood work.    Post Op Appointment: 3/20/25 @ 3:30pm Scarlett Dior NP ([address])    YOUR SURGERY HAS BEEN SCHEDULED AT:     [facility]  [address] [address]    PATIENT'S NAME: Jameson Hernandez      SURGERY DATE: 3/4/25    REPORT DIRECTLY TO: The Day Surgery Desk located through the main entrance, on the 3rd floor. It is not necessary for you to stop at the main admitting desk. You may request a wheelchair escort to the department when arriving at the hospital's main entrance.      CHECK IN TIME: 5:00am  *You will need to arrive 2 hours prior to surgery so caregivers will be able to safely prepare you for you procedure. This is hospital policy.    SURGERY TIME: 7:00am  *Please be aware that emergencies do occur which may cause changes in the timing of your procedure. Every attempt will be made to keep you informed of changes as they occur. We appreciate your cooperation with this, please do not hesitate to let us know what can be done to make your stay as pleasant as possible while you wait for your procedure.    PROCEDURE: LEFT UPPER ARM AV GRAFT    INSTRUCTIONS:  Nothing to eat or drink including water after 10:00pm the evening before surgery.   *A nurse from the preadmission test center will call you with instructions regarding medications that are ok to take that same day. If you were told to take medication that morning, please do so with a small sip of water.    MEDICATION INSTRUCTIONS: Stop taking Trulicity injections on 2/25/25 (7 days before surgery). You do not need to stop taking Aspirin.          YOUR SURGERY IS AT: [facility].   *1 to 2 days prior to your surgery you will receive  two phone calls from the hospital. One will be from the Pre-admission department to confirm your procedure and update insurance information. The second phone call will be from a nurse with the Test Center. They will review medical history, go over further medication instructions, answer questions, and set you up for pre op testing (usually an EKG and blood work).    Transportation   *Please be aware that for your safety, you will not be able to drive yourself home or stay at home alone for 24 hours after discharge. Therefore, it will be necessary for you to make arrangements for transportation home, and someone to stay overnight at home with you that evening.  We recommend a responsible adult family member or friend. A bus or cab ride home is not allowed. At this time there is NO  parking due to Covid. Special parking is available on the 3rd floor of the parking structure for Day Surgery patients.    Short Term Disability/FMLA  *Please fill out your portion of the paperwork and either drop off or fax forms to [phone]. Forms can take 7-10 business days to process. Make sure to leave clear instruction of where you would like to completed paperwork to go.       **Cancelation Policy:  We understand things may come up that are out of your control. We ask that you call our office at [phone] as soon as possible before your procedure if you need to cancel. This will allow us to provide care to another patient in need.  Please be aware, if you miss or do not cancel appropriately, we may no longer be able to serve as your health care provider.     75

## 2025-02-03 ENCOUNTER — NON-APPOINTMENT (OUTPATIENT)
Age: 80
End: 2025-02-03

## 2025-02-03 ENCOUNTER — APPOINTMENT (OUTPATIENT)
Dept: OPHTHALMOLOGY | Facility: CLINIC | Age: 80
End: 2025-02-03
Payer: MEDICARE

## 2025-02-03 PROBLEM — Z86.79 PERSONAL HISTORY OF OTHER DISEASES OF THE CIRCULATORY SYSTEM: Chronic | Status: ACTIVE | Noted: 2025-01-28

## 2025-02-03 PROCEDURE — 99024 POSTOP FOLLOW-UP VISIT: CPT

## 2025-02-04 ENCOUNTER — APPOINTMENT (OUTPATIENT)
Dept: INTERNAL MEDICINE | Facility: CLINIC | Age: 80
End: 2025-02-04
Payer: MEDICARE

## 2025-02-04 ENCOUNTER — APPOINTMENT (OUTPATIENT)
Dept: INTERNAL MEDICINE | Facility: CLINIC | Age: 80
End: 2025-02-04

## 2025-02-04 VITALS
HEIGHT: 64 IN | TEMPERATURE: 97.3 F | DIASTOLIC BLOOD PRESSURE: 75 MMHG | BODY MASS INDEX: 29.88 KG/M2 | SYSTOLIC BLOOD PRESSURE: 156 MMHG | OXYGEN SATURATION: 99 % | WEIGHT: 175 LBS | HEART RATE: 79 BPM

## 2025-02-04 DIAGNOSIS — E55.9 VITAMIN D DEFICIENCY, UNSPECIFIED: ICD-10-CM

## 2025-02-04 PROCEDURE — 36415 COLL VENOUS BLD VENIPUNCTURE: CPT

## 2025-02-04 PROCEDURE — 99214 OFFICE O/P EST MOD 30 MIN: CPT | Mod: 25

## 2025-02-05 LAB
25(OH)D3 SERPL-MCNC: 29.7 NG/ML
ANION GAP SERPL CALC-SCNC: 13 MMOL/L
BUN SERPL-MCNC: 30 MG/DL
CALCIUM SERPL-MCNC: 10.4 MG/DL
CHLORIDE SERPL-SCNC: 109 MMOL/L
CO2 SERPL-SCNC: 22 MMOL/L
CREAT SERPL-MCNC: 1.92 MG/DL
EGFR: 26 ML/MIN/1.73M2
GLUCOSE SERPL-MCNC: 139 MG/DL
POTASSIUM SERPL-SCNC: 4.2 MMOL/L
SODIUM SERPL-SCNC: 144 MMOL/L

## 2025-02-06 ENCOUNTER — APPOINTMENT (OUTPATIENT)
Dept: OPHTHALMOLOGY | Facility: CLINIC | Age: 80
End: 2025-02-06

## 2025-02-10 ENCOUNTER — NON-APPOINTMENT (OUTPATIENT)
Age: 80
End: 2025-02-10

## 2025-02-10 ENCOUNTER — APPOINTMENT (OUTPATIENT)
Dept: OPHTHALMOLOGY | Facility: CLINIC | Age: 80
End: 2025-02-10
Payer: MEDICARE

## 2025-02-10 PROCEDURE — 99024 POSTOP FOLLOW-UP VISIT: CPT

## 2025-02-10 NOTE — ASU PATIENT PROFILE, ADULT - NSICDXPASTSURGICALHX_GEN_ALL_CORE_FT
PAST SURGICAL HISTORY:  Carotid stenosis     H/O endarterectomy Rt    H/O total hysterectomy     Heart murmur     Uterine fibroid removed

## 2025-02-10 NOTE — ASU PATIENT PROFILE, ADULT - PREOP PAIN SCORE
Detail Level: Detailed
General Sunscreen Counseling: broad spectrum sunscreen/photoprotection reviewed extensively with patient - recommended SPF 30 or higher
0

## 2025-02-10 NOTE — ASU PATIENT PROFILE, ADULT - NSICDXPASTMEDICALHX_GEN_ALL_CORE_FT
PAST MEDICAL HISTORY:  Diabetes type 2    Heart murmur     Hypercalcemia     Hyperlipidemia     Hypertension

## 2025-02-11 ENCOUNTER — NON-APPOINTMENT (OUTPATIENT)
Age: 80
End: 2025-02-11

## 2025-02-11 ENCOUNTER — OUTPATIENT (OUTPATIENT)
Dept: OUTPATIENT SERVICES | Facility: HOSPITAL | Age: 80
LOS: 1 days | Discharge: HOME | End: 2025-02-11

## 2025-02-11 ENCOUNTER — APPOINTMENT (OUTPATIENT)
Dept: OPHTHALMOLOGY | Facility: AMBULATORY SURGERY CENTER | Age: 80
End: 2025-02-11
Payer: MEDICARE

## 2025-02-11 VITALS
OXYGEN SATURATION: 98 % | WEIGHT: 168.87 LBS | DIASTOLIC BLOOD PRESSURE: 81 MMHG | TEMPERATURE: 98 F | HEIGHT: 63 IN | SYSTOLIC BLOOD PRESSURE: 199 MMHG | RESPIRATION RATE: 16 BRPM | HEART RATE: 88 BPM

## 2025-02-11 VITALS
HEART RATE: 83 BPM | TEMPERATURE: 99 F | OXYGEN SATURATION: 98 % | DIASTOLIC BLOOD PRESSURE: 56 MMHG | SYSTOLIC BLOOD PRESSURE: 151 MMHG | RESPIRATION RATE: 16 BRPM

## 2025-02-11 DIAGNOSIS — D25.9 LEIOMYOMA OF UTERUS, UNSPECIFIED: Chronic | ICD-10-CM

## 2025-02-11 DIAGNOSIS — Z98.890 OTHER SPECIFIED POSTPROCEDURAL STATES: Chronic | ICD-10-CM

## 2025-02-11 DIAGNOSIS — Z90.710 ACQUIRED ABSENCE OF BOTH CERVIX AND UTERUS: Chronic | ICD-10-CM

## 2025-02-11 DIAGNOSIS — R01.1 CARDIAC MURMUR, UNSPECIFIED: Chronic | ICD-10-CM

## 2025-02-11 DIAGNOSIS — I65.29 OCCLUSION AND STENOSIS OF UNSPECIFIED CAROTID ARTERY: Chronic | ICD-10-CM

## 2025-02-11 PROBLEM — Z86.79 PERSONAL HISTORY OF OTHER DISEASES OF THE CIRCULATORY SYSTEM: Chronic | Status: INACTIVE | Noted: 2025-01-28 | Resolved: 2025-02-10

## 2025-02-11 PROCEDURE — 66840 REMOVAL OF LENS MATERIAL: CPT | Mod: 79,LT

## 2025-02-11 RX ORDER — PILOCARPINE HCL 2 %
1 DROPS OPHTHALMIC (EYE) ONCE
Refills: 0 | Status: DISCONTINUED | OUTPATIENT
Start: 2025-02-11 | End: 2025-02-11

## 2025-02-11 RX ORDER — SODIUM CHLORIDE 9 G/ML
1000 INJECTION, SOLUTION INTRAVENOUS
Refills: 0 | Status: DISCONTINUED | OUTPATIENT
Start: 2025-02-11 | End: 2025-02-11

## 2025-02-11 RX ORDER — SEMAGLUTIDE 0.25 MG/.5ML
0 INJECTION, SOLUTION SUBCUTANEOUS
Refills: 0 | DISCHARGE

## 2025-02-11 RX ORDER — TETRACAINE HCL 0.5 %
1 DROPS OPHTHALMIC (EYE) ONCE
Refills: 0 | Status: DISCONTINUED | OUTPATIENT
Start: 2025-02-11 | End: 2025-02-11

## 2025-02-11 RX ORDER — ACETAMINOPHEN 160 MG/5ML
650 SUSPENSION ORAL ONCE
Refills: 0 | Status: DISCONTINUED | OUTPATIENT
Start: 2025-02-11 | End: 2025-02-11

## 2025-02-11 RX ORDER — TETRACAINE HCL 0.5 %
1 DROPS OPHTHALMIC (EYE) ONCE
Refills: 0 | Status: COMPLETED | OUTPATIENT
Start: 2025-02-11 | End: 2025-02-11

## 2025-02-11 RX ORDER — PILOCARPINE HCL 2 %
1 DROPS OPHTHALMIC (EYE) ONCE
Refills: 0 | Status: COMPLETED | OUTPATIENT
Start: 2025-02-11 | End: 2025-02-11

## 2025-02-11 RX ORDER — ONDANSETRON 4 MG/1
4 TABLET, ORALLY DISINTEGRATING ORAL ONCE
Refills: 0 | Status: DISCONTINUED | OUTPATIENT
Start: 2025-02-11 | End: 2025-02-11

## 2025-02-11 RX ADMIN — Medication 1 DROP(S): at 07:22

## 2025-02-11 NOTE — PRE-ANESTHESIA EVALUATION ADULT - BSA (M2)
[Normal Appearance] : normal appearance [No Deformities] : no deformities [] : no respiratory distress [Respiration, Rhythm And Depth] : normal respiratory rhythm and effort [Exaggerated Use Of Accessory Muscles For Inspiration] : no accessory muscle use [Heart Rate And Rhythm] : heart rate and rhythm were normal [Heart Sounds] : normal S1 and S2 [Abdomen Tenderness] : non-tender [Affect] : the affect was normal [Skin Color & Pigmentation] : normal skin color and pigmentation [Mood] : the mood was normal [Oriented To Time, Place, And Person] : oriented to person, place, and time [FreeTextEntry1] : right l/e edema (chronic per pt.) 1.8

## 2025-02-11 NOTE — OPERATIVE REPORT - OPERATIVE RPOSRT DETAILS
SURGEON: Anna Campuzano    ASSISTANT: none    PRE-OP DIAGNOSIS: retained lens fragment in anterior chamber, OS    POST-OP DIAGNOSIS: Same    ANESTHESIA: MAC, local (retrobulbar block)    PROCEDURE: cataract extraction with intraocular lens placement, left eye    SPECIMEN/TISSUE REMOVED: None    ESTIMATED BLOOD LOSS: < 1mL    COMPLICATIONS: None    PROCEDURE:    The patient was seen in the preoperative area. The risks, benefits, and alternatives to surgery were discussed. All questions were answered.  The patient was given standard preoperative drops. The patient was then brought to the operating room and prepped and draped in the usual sterile fashion for ophthalmic surgery.    A lid speculum was used to expose the eye.  A paracentesis was created with an MVR blade at 4 o'clock hour from the temporal limbus in the clockwise direction.  Next, 1% Preservative-free Lidocaine was instilled into the anterior chamber.  Due to extreme ocular movement, a retrobulbar block was administered consisting of a 50:50 mixture of 4% lidocaine and 0.75% marcaine with a volume of 3mL. The clear corneal incision was opened using a Sinskey hook.  An attempt to remove the lens fragment via aspiration at the 7 o'clock position was made.  The anterior chamber was irrigated and aspirated several times and no lens fragment was appreciated. The temporal clear corneal and paracentesis incisions were hydrated with balanced salt solution to achieve adequate watertight closure. The wounds were checked for leaks and found to be negative.    An antibiotic ointment was placed into the fornices of the eye, and the eye was patched and shielded.    The patient tolerated the procedure well and was transferred to the recovery room in stable condition. They received standard postoperative instructions and an appointment to follow up the next day.

## 2025-02-12 ENCOUNTER — NON-APPOINTMENT (OUTPATIENT)
Age: 80
End: 2025-02-12

## 2025-02-12 ENCOUNTER — APPOINTMENT (OUTPATIENT)
Dept: OPHTHALMOLOGY | Facility: CLINIC | Age: 80
End: 2025-02-12
Payer: MEDICARE

## 2025-02-12 PROBLEM — E55.9 VITAMIN D DEFICIENCY: Status: ACTIVE | Noted: 2025-02-12

## 2025-02-12 PROCEDURE — 99024 POSTOP FOLLOW-UP VISIT: CPT

## 2025-02-12 RX ORDER — CHOLECALCIFEROL (VITAMIN D3) 25 MCG
25 MCG CAPSULE ORAL
Qty: 30 | Refills: 3 | Status: ACTIVE | COMMUNITY
Start: 2025-02-12 | End: 1900-01-01

## 2025-02-13 LAB
24R-OH-CALCIDIOL SERPL-MCNC: 42.6 PG/ML
ALBUMIN MFR SERPL ELPH: 60.5 %
ALBUMIN SERPL-MCNC: 4.3 G/DL
ALBUMIN/GLOB SERPL: 1.5 RATIO
ALPHA1 GLOB MFR SERPL ELPH: 4.8 %
ALPHA1 GLOB SERPL ELPH-MCNC: 0.3 G/DL
ALPHA2 GLOB MFR SERPL ELPH: 7.6 %
ALPHA2 GLOB SERPL ELPH-MCNC: 0.5 G/DL
B-GLOBULIN MFR SERPL ELPH: 12.9 %
B-GLOBULIN SERPL ELPH-MCNC: 0.9 G/DL
DEPRECATED KAPPA LC FREE/LAMBDA SER: 2.12 RATIO
GAMMA GLOB FLD ELPH-MCNC: 1 G/DL
GAMMA GLOB MFR SERPL ELPH: 14.2 %
INTERPRETATION SERPL IEP-IMP: NORMAL
KAPPA LC CSF-MCNC: 2.63 MG/DL
KAPPA LC SERPL-MCNC: 5.57 MG/DL
M PROTEIN SPEC IFE-MCNC: NORMAL
PHOSPHATE SERPL-MCNC: 3.4 MG/DL
PROT SERPL-MCNC: 7.1 G/DL
PROT SERPL-MCNC: 7.1 G/DL

## 2025-02-24 ENCOUNTER — APPOINTMENT (OUTPATIENT)
Dept: OPHTHALMOLOGY | Facility: CLINIC | Age: 80
End: 2025-02-24
Payer: MEDICARE

## 2025-02-24 ENCOUNTER — NON-APPOINTMENT (OUTPATIENT)
Age: 80
End: 2025-02-24

## 2025-02-24 PROBLEM — R01.1 CARDIAC MURMUR, UNSPECIFIED: Chronic | Status: ACTIVE | Noted: 2025-02-10

## 2025-02-24 PROCEDURE — 99024 POSTOP FOLLOW-UP VISIT: CPT

## 2025-03-04 ENCOUNTER — APPOINTMENT (OUTPATIENT)
Dept: INTERNAL MEDICINE | Facility: CLINIC | Age: 80
End: 2025-03-04
Payer: MEDICARE

## 2025-03-04 VITALS
DIASTOLIC BLOOD PRESSURE: 84 MMHG | TEMPERATURE: 97 F | BODY MASS INDEX: 29.88 KG/M2 | WEIGHT: 175 LBS | HEIGHT: 64 IN | HEART RATE: 62 BPM | SYSTOLIC BLOOD PRESSURE: 183 MMHG | OXYGEN SATURATION: 97 %

## 2025-03-04 DIAGNOSIS — I65.23 OCCLUSION AND STENOSIS OF BILATERAL CAROTID ARTERIES: ICD-10-CM

## 2025-03-04 DIAGNOSIS — M10.9 GOUT, UNSPECIFIED: ICD-10-CM

## 2025-03-04 DIAGNOSIS — E78.5 HYPERLIPIDEMIA, UNSPECIFIED: ICD-10-CM

## 2025-03-04 DIAGNOSIS — E11.9 TYPE 2 DIABETES MELLITUS W/OUT COMPLICATIONS: ICD-10-CM

## 2025-03-04 DIAGNOSIS — H26.9 UNSPECIFIED CATARACT: ICD-10-CM

## 2025-03-04 PROCEDURE — 36415 COLL VENOUS BLD VENIPUNCTURE: CPT

## 2025-03-04 PROCEDURE — 99214 OFFICE O/P EST MOD 30 MIN: CPT | Mod: GC,25

## 2025-03-05 LAB
CHOLEST SERPL-MCNC: 207 MG/DL
ESTIMATED AVERAGE GLUCOSE: 194 MG/DL
HBA1C MFR BLD HPLC: 8.4 %
HDLC SERPL-MCNC: 68 MG/DL
LDLC SERPL CALC-MCNC: 127 MG/DL
NONHDLC SERPL-MCNC: 139 MG/DL
TRIGL SERPL-MCNC: 68 MG/DL
URATE SERPL-MCNC: 5 MG/DL

## 2025-03-07 ENCOUNTER — APPOINTMENT (OUTPATIENT)
Dept: INTERNAL MEDICINE | Facility: CLINIC | Age: 80
End: 2025-03-07
Payer: MEDICARE

## 2025-03-07 VITALS
SYSTOLIC BLOOD PRESSURE: 132 MMHG | HEART RATE: 64 BPM | TEMPERATURE: 97.1 F | OXYGEN SATURATION: 98 % | BODY MASS INDEX: 30.12 KG/M2 | DIASTOLIC BLOOD PRESSURE: 77 MMHG | HEIGHT: 63 IN | WEIGHT: 170 LBS

## 2025-03-07 DIAGNOSIS — I10 ESSENTIAL (PRIMARY) HYPERTENSION: ICD-10-CM

## 2025-03-07 DIAGNOSIS — M79.641 PAIN IN RIGHT HAND: ICD-10-CM

## 2025-03-07 DIAGNOSIS — I50.30 UNSPECIFIED DIASTOLIC (CONGESTIVE) HEART FAILURE: ICD-10-CM

## 2025-03-07 DIAGNOSIS — N18.30 CHRONIC KIDNEY DISEASE, STAGE 3 UNSPECIFIED: ICD-10-CM

## 2025-03-07 PROCEDURE — G2211 COMPLEX E/M VISIT ADD ON: CPT

## 2025-03-07 PROCEDURE — 99214 OFFICE O/P EST MOD 30 MIN: CPT

## 2025-03-07 RX ORDER — CHOLECALCIFEROL (VITAMIN D3) 25 MCG
25 MCG TABLET ORAL DAILY
Qty: 90 | Refills: 1 | Status: ACTIVE | COMMUNITY
Start: 2025-03-07 | End: 1900-01-01

## 2025-03-07 RX ORDER — ALLOPURINOL 100 MG/1
100 TABLET ORAL TWICE DAILY
Qty: 180 | Refills: 0 | Status: ACTIVE | COMMUNITY
Start: 2025-03-07 | End: 1900-01-01

## 2025-03-07 RX ORDER — FENOFIBRIC ACID 45 MG/1
45 CAPSULE, DELAYED RELEASE ORAL DAILY
Qty: 90 | Refills: 1 | Status: ACTIVE | COMMUNITY
Start: 2025-03-07 | End: 1900-01-01

## 2025-03-07 RX ORDER — LOVASTATIN 40 MG/1
40 TABLET ORAL
Qty: 90 | Refills: 0 | Status: ACTIVE | COMMUNITY
Start: 2025-03-07

## 2025-03-07 RX ORDER — METOPROLOL SUCCINATE 50 MG/1
50 TABLET, EXTENDED RELEASE ORAL DAILY
Qty: 90 | Refills: 1 | Status: ACTIVE | COMMUNITY
Start: 2025-03-07 | End: 1900-01-01

## 2025-03-07 RX ORDER — ATORVASTATIN CALCIUM 40 MG/1
40 TABLET, FILM COATED ORAL DAILY
Qty: 90 | Refills: 3 | Status: DISCONTINUED | COMMUNITY
Start: 2025-03-06 | End: 2025-03-07

## 2025-03-10 ENCOUNTER — OUTPATIENT (OUTPATIENT)
Dept: OUTPATIENT SERVICES | Facility: HOSPITAL | Age: 80
LOS: 1 days | End: 2025-03-10
Payer: COMMERCIAL

## 2025-03-10 DIAGNOSIS — Z90.710 ACQUIRED ABSENCE OF BOTH CERVIX AND UTERUS: Chronic | ICD-10-CM

## 2025-03-10 DIAGNOSIS — Z98.890 OTHER SPECIFIED POSTPROCEDURAL STATES: Chronic | ICD-10-CM

## 2025-03-10 DIAGNOSIS — D25.9 LEIOMYOMA OF UTERUS, UNSPECIFIED: Chronic | ICD-10-CM

## 2025-03-10 DIAGNOSIS — I65.29 OCCLUSION AND STENOSIS OF UNSPECIFIED CAROTID ARTERY: Chronic | ICD-10-CM

## 2025-03-10 PROCEDURE — 73130 X-RAY EXAM OF HAND: CPT

## 2025-03-10 PROCEDURE — 73130 X-RAY EXAM OF HAND: CPT | Mod: 26,RT

## 2025-03-13 ENCOUNTER — OUTPATIENT (OUTPATIENT)
Dept: OUTPATIENT SERVICES | Facility: HOSPITAL | Age: 80
LOS: 1 days | End: 2025-03-13
Payer: COMMERCIAL

## 2025-03-13 DIAGNOSIS — Z90.710 ACQUIRED ABSENCE OF BOTH CERVIX AND UTERUS: Chronic | ICD-10-CM

## 2025-03-13 DIAGNOSIS — I65.29 OCCLUSION AND STENOSIS OF UNSPECIFIED CAROTID ARTERY: Chronic | ICD-10-CM

## 2025-03-13 DIAGNOSIS — D25.9 LEIOMYOMA OF UTERUS, UNSPECIFIED: Chronic | ICD-10-CM

## 2025-03-13 DIAGNOSIS — R01.1 CARDIAC MURMUR, UNSPECIFIED: Chronic | ICD-10-CM

## 2025-03-13 DIAGNOSIS — Z98.890 OTHER SPECIFIED POSTPROCEDURAL STATES: Chronic | ICD-10-CM

## 2025-03-13 PROCEDURE — 73110 X-RAY EXAM OF WRIST: CPT

## 2025-03-13 PROCEDURE — 73110 X-RAY EXAM OF WRIST: CPT | Mod: 26,RT

## 2025-03-19 ENCOUNTER — NON-APPOINTMENT (OUTPATIENT)
Age: 80
End: 2025-03-19

## 2025-03-19 ENCOUNTER — APPOINTMENT (OUTPATIENT)
Dept: HEART AND VASCULAR | Facility: CLINIC | Age: 80
End: 2025-03-19
Payer: MEDICARE

## 2025-03-19 VITALS
SYSTOLIC BLOOD PRESSURE: 192 MMHG | DIASTOLIC BLOOD PRESSURE: 84 MMHG | HEIGHT: 63 IN | WEIGHT: 175 LBS | OXYGEN SATURATION: 96 % | HEART RATE: 75 BPM | BODY MASS INDEX: 31.01 KG/M2 | TEMPERATURE: 97.6 F

## 2025-03-19 DIAGNOSIS — I35.0 NONRHEUMATIC AORTIC (VALVE) STENOSIS: ICD-10-CM

## 2025-03-19 PROCEDURE — 93000 ELECTROCARDIOGRAM COMPLETE: CPT

## 2025-03-19 PROCEDURE — 99214 OFFICE O/P EST MOD 30 MIN: CPT | Mod: 25

## 2025-03-19 RX ORDER — NIFEDIPINE 90 MG/1
90 TABLET, EXTENDED RELEASE ORAL DAILY
Qty: 90 | Refills: 3 | Status: ACTIVE | COMMUNITY
Start: 2025-03-19 | End: 1900-01-01

## 2025-03-21 ENCOUNTER — APPOINTMENT (OUTPATIENT)
Dept: HEART AND VASCULAR | Facility: CLINIC | Age: 80
End: 2025-03-21
Payer: MEDICARE

## 2025-03-21 VITALS
OXYGEN SATURATION: 99 % | SYSTOLIC BLOOD PRESSURE: 158 MMHG | HEIGHT: 63 IN | BODY MASS INDEX: 31.01 KG/M2 | HEART RATE: 98 BPM | TEMPERATURE: 97.8 F | DIASTOLIC BLOOD PRESSURE: 62 MMHG | WEIGHT: 175 LBS

## 2025-03-21 DIAGNOSIS — I16.0 HYPERTENSIVE URGENCY: ICD-10-CM

## 2025-03-21 PROCEDURE — 93000 ELECTROCARDIOGRAM COMPLETE: CPT

## 2025-03-21 PROCEDURE — 99213 OFFICE O/P EST LOW 20 MIN: CPT

## 2025-03-21 PROCEDURE — G2211 COMPLEX E/M VISIT ADD ON: CPT

## 2025-03-24 ENCOUNTER — NON-APPOINTMENT (OUTPATIENT)
Age: 80
End: 2025-03-24

## 2025-03-24 ENCOUNTER — APPOINTMENT (OUTPATIENT)
Dept: OPHTHALMOLOGY | Facility: CLINIC | Age: 80
End: 2025-03-24
Payer: MEDICARE

## 2025-03-24 PROCEDURE — 99024 POSTOP FOLLOW-UP VISIT: CPT

## 2025-03-28 ENCOUNTER — APPOINTMENT (OUTPATIENT)
Dept: HEART AND VASCULAR | Facility: CLINIC | Age: 80
End: 2025-03-28

## 2025-03-28 ENCOUNTER — NON-APPOINTMENT (OUTPATIENT)
Age: 80
End: 2025-03-28

## 2025-03-28 VITALS
HEART RATE: 98 BPM | WEIGHT: 171 LBS | TEMPERATURE: 98.1 F | HEIGHT: 63 IN | SYSTOLIC BLOOD PRESSURE: 142 MMHG | BODY MASS INDEX: 30.3 KG/M2 | DIASTOLIC BLOOD PRESSURE: 60 MMHG | OXYGEN SATURATION: 97 %

## 2025-03-28 DIAGNOSIS — I10 ESSENTIAL (PRIMARY) HYPERTENSION: ICD-10-CM

## 2025-03-28 PROCEDURE — 93306 TTE W/DOPPLER COMPLETE: CPT

## 2025-03-28 PROCEDURE — G2211 COMPLEX E/M VISIT ADD ON: CPT

## 2025-03-28 PROCEDURE — 99214 OFFICE O/P EST MOD 30 MIN: CPT

## 2025-03-28 PROCEDURE — 93000 ELECTROCARDIOGRAM COMPLETE: CPT

## 2025-03-28 RX ORDER — TERAZOSIN 10 MG/1
10 CAPSULE ORAL
Refills: 0 | Status: ACTIVE | COMMUNITY
Start: 2025-03-28

## 2025-03-31 ENCOUNTER — APPOINTMENT (OUTPATIENT)
Dept: HEART AND VASCULAR | Facility: CLINIC | Age: 80
End: 2025-03-31

## 2025-04-02 ENCOUNTER — APPOINTMENT (OUTPATIENT)
Dept: ORTHOPEDIC SURGERY | Facility: CLINIC | Age: 80
End: 2025-04-02

## 2025-04-02 PROBLEM — M25.531 RIGHT WRIST PAIN: Status: ACTIVE | Noted: 2025-04-02

## 2025-04-02 PROBLEM — M18.9 OSTEOARTHRITIS OF CMC JOINT OF THUMB: Status: ACTIVE | Noted: 2025-04-02

## 2025-04-02 PROCEDURE — 20605 DRAIN/INJ JOINT/BURSA W/O US: CPT | Mod: RT

## 2025-04-02 PROCEDURE — 73110 X-RAY EXAM OF WRIST: CPT | Mod: 50

## 2025-04-02 PROCEDURE — 99204 OFFICE O/P NEW MOD 45 MIN: CPT | Mod: 25

## 2025-04-07 ENCOUNTER — APPOINTMENT (OUTPATIENT)
Dept: INTERNAL MEDICINE | Facility: CLINIC | Age: 80
End: 2025-04-07
Payer: MEDICARE

## 2025-04-07 VITALS
HEIGHT: 63 IN | DIASTOLIC BLOOD PRESSURE: 68 MMHG | HEART RATE: 93 BPM | SYSTOLIC BLOOD PRESSURE: 145 MMHG | OXYGEN SATURATION: 98 % | TEMPERATURE: 97.5 F | WEIGHT: 172 LBS | BODY MASS INDEX: 30.48 KG/M2

## 2025-04-07 DIAGNOSIS — I10 ESSENTIAL (PRIMARY) HYPERTENSION: ICD-10-CM

## 2025-04-07 DIAGNOSIS — M25.531 PAIN IN RIGHT WRIST: ICD-10-CM

## 2025-04-07 DIAGNOSIS — M18.9 OSTEOARTHRITIS OF FIRST CARPOMETACARPAL JOINT, UNSPECIFIED: ICD-10-CM

## 2025-04-07 PROCEDURE — 99214 OFFICE O/P EST MOD 30 MIN: CPT

## 2025-04-07 PROCEDURE — G2211 COMPLEX E/M VISIT ADD ON: CPT

## 2025-04-08 ENCOUNTER — APPOINTMENT (OUTPATIENT)
Dept: INTERNAL MEDICINE | Facility: CLINIC | Age: 80
End: 2025-04-08

## 2025-04-10 RX ORDER — LOSARTAN POTASSIUM 50 MG/1
50 TABLET, FILM COATED ORAL
Qty: 90 | Refills: 3 | Status: ACTIVE | COMMUNITY
Start: 2025-04-10 | End: 1900-01-01

## 2025-04-14 ENCOUNTER — NON-APPOINTMENT (OUTPATIENT)
Age: 80
End: 2025-04-14

## 2025-04-14 ENCOUNTER — APPOINTMENT (OUTPATIENT)
Dept: OPHTHALMOLOGY | Facility: CLINIC | Age: 80
End: 2025-04-14
Payer: MEDICARE

## 2025-04-14 PROCEDURE — 99024 POSTOP FOLLOW-UP VISIT: CPT

## 2025-04-25 ENCOUNTER — NON-APPOINTMENT (OUTPATIENT)
Age: 80
End: 2025-04-25

## 2025-04-25 ENCOUNTER — APPOINTMENT (OUTPATIENT)
Dept: HEART AND VASCULAR | Facility: CLINIC | Age: 80
End: 2025-04-25
Payer: MEDICARE

## 2025-04-25 VITALS
HEART RATE: 95 BPM | HEIGHT: 63 IN | WEIGHT: 171 LBS | OXYGEN SATURATION: 97 % | DIASTOLIC BLOOD PRESSURE: 68 MMHG | TEMPERATURE: 98 F | SYSTOLIC BLOOD PRESSURE: 132 MMHG | BODY MASS INDEX: 30.3 KG/M2

## 2025-04-25 DIAGNOSIS — I50.30 UNSPECIFIED DIASTOLIC (CONGESTIVE) HEART FAILURE: ICD-10-CM

## 2025-04-25 PROCEDURE — G2211 COMPLEX E/M VISIT ADD ON: CPT

## 2025-04-25 PROCEDURE — 36415 COLL VENOUS BLD VENIPUNCTURE: CPT

## 2025-04-25 PROCEDURE — 93000 ELECTROCARDIOGRAM COMPLETE: CPT

## 2025-04-25 PROCEDURE — 99214 OFFICE O/P EST MOD 30 MIN: CPT

## 2025-05-13 ENCOUNTER — APPOINTMENT (OUTPATIENT)
Dept: VASCULAR SURGERY | Facility: CLINIC | Age: 80
End: 2025-05-13
Payer: MEDICARE

## 2025-05-13 ENCOUNTER — NON-APPOINTMENT (OUTPATIENT)
Age: 80
End: 2025-05-13

## 2025-05-13 VITALS
HEIGHT: 63 IN | BODY MASS INDEX: 30.3 KG/M2 | HEART RATE: 78 BPM | WEIGHT: 171 LBS | SYSTOLIC BLOOD PRESSURE: 107 MMHG | DIASTOLIC BLOOD PRESSURE: 68 MMHG

## 2025-05-13 DIAGNOSIS — I65.23 OCCLUSION AND STENOSIS OF BILATERAL CAROTID ARTERIES: ICD-10-CM

## 2025-05-13 PROCEDURE — 93880 EXTRACRANIAL BILAT STUDY: CPT

## 2025-05-13 PROCEDURE — 99213 OFFICE O/P EST LOW 20 MIN: CPT

## 2025-06-05 ENCOUNTER — NON-APPOINTMENT (OUTPATIENT)
Age: 80
End: 2025-06-05

## 2025-06-16 ENCOUNTER — RESULT CHARGE (OUTPATIENT)
Age: 80
End: 2025-06-16

## 2025-06-16 ENCOUNTER — APPOINTMENT (OUTPATIENT)
Dept: INTERNAL MEDICINE | Facility: CLINIC | Age: 80
End: 2025-06-16
Payer: MEDICARE

## 2025-06-16 VITALS
HEIGHT: 63 IN | DIASTOLIC BLOOD PRESSURE: 77 MMHG | SYSTOLIC BLOOD PRESSURE: 191 MMHG | TEMPERATURE: 97.1 F | WEIGHT: 172 LBS | BODY MASS INDEX: 30.48 KG/M2 | OXYGEN SATURATION: 99 % | HEART RATE: 85 BPM

## 2025-06-16 VITALS — SYSTOLIC BLOOD PRESSURE: 195 MMHG | DIASTOLIC BLOOD PRESSURE: 78 MMHG

## 2025-06-16 VITALS — SYSTOLIC BLOOD PRESSURE: 208 MMHG | DIASTOLIC BLOOD PRESSURE: 81 MMHG

## 2025-06-16 LAB — HBA1C MFR BLD HPLC: 7.4

## 2025-06-16 PROCEDURE — G2211 COMPLEX E/M VISIT ADD ON: CPT

## 2025-06-16 PROCEDURE — 99214 OFFICE O/P EST MOD 30 MIN: CPT

## 2025-06-16 RX ORDER — ATORVASTATIN CALCIUM 40 MG/1
40 TABLET, FILM COATED ORAL
Qty: 30 | Refills: 2 | Status: ACTIVE | COMMUNITY
Start: 2025-06-16

## 2025-06-19 PROBLEM — L81.9 HYPERPIGMENTATION: Status: ACTIVE | Noted: 2025-06-19

## 2025-07-03 ENCOUNTER — APPOINTMENT (OUTPATIENT)
Dept: RADIOLOGY | Facility: CLINIC | Age: 80
End: 2025-07-03
Payer: MEDICARE

## 2025-07-03 PROCEDURE — 77080 DXA BONE DENSITY AXIAL: CPT

## 2025-07-09 ENCOUNTER — APPOINTMENT (OUTPATIENT)
Dept: NEPHROLOGY | Facility: CLINIC | Age: 80
End: 2025-07-09
Payer: MEDICARE

## 2025-07-09 VITALS — DIASTOLIC BLOOD PRESSURE: 78 MMHG | SYSTOLIC BLOOD PRESSURE: 144 MMHG | HEART RATE: 84 BPM

## 2025-07-09 PROCEDURE — G2211 COMPLEX E/M VISIT ADD ON: CPT

## 2025-07-09 PROCEDURE — 99214 OFFICE O/P EST MOD 30 MIN: CPT

## 2025-07-14 ENCOUNTER — APPOINTMENT (OUTPATIENT)
Dept: INTERNAL MEDICINE | Facility: CLINIC | Age: 80
End: 2025-07-14
Payer: MEDICARE

## 2025-07-14 VITALS
SYSTOLIC BLOOD PRESSURE: 164 MMHG | HEIGHT: 63 IN | DIASTOLIC BLOOD PRESSURE: 73 MMHG | HEART RATE: 82 BPM | TEMPERATURE: 97.6 F | WEIGHT: 167 LBS | OXYGEN SATURATION: 97 % | BODY MASS INDEX: 29.59 KG/M2

## 2025-07-14 PROBLEM — Z13.820 OSTEOPOROSIS SCREENING: Status: ACTIVE | Noted: 2025-06-16

## 2025-07-14 PROBLEM — E66.811 OBESITY, CLASS I, BMI 30.0-34.9 (SEE ACTUAL BMI): Status: ACTIVE | Noted: 2025-07-14

## 2025-07-14 PROBLEM — Z87.898 HISTORY OF ABNORMAL MAMMOGRAM: Status: RESOLVED | Noted: 2025-07-14 | Resolved: 2025-07-14

## 2025-07-14 PROBLEM — Z12.11 COLON CANCER SCREENING: Status: RESOLVED | Noted: 2025-07-14 | Resolved: 2025-07-14

## 2025-07-14 PROCEDURE — G0136: CPT

## 2025-07-14 PROCEDURE — 36415 COLL VENOUS BLD VENIPUNCTURE: CPT

## 2025-07-14 PROCEDURE — G0439: CPT | Mod: GC

## 2025-07-14 PROCEDURE — G0444 DEPRESSION SCREEN ANNUAL: CPT | Mod: 59

## 2025-07-15 ENCOUNTER — APPOINTMENT (OUTPATIENT)
Dept: DERMATOLOGY | Facility: CLINIC | Age: 80
End: 2025-07-15
Payer: MEDICARE

## 2025-07-15 PROBLEM — L81.8 IDIOPATHIC GUTTATE HYPOMELANOSIS: Status: ACTIVE | Noted: 2025-07-15

## 2025-07-15 LAB
ALBUMIN SERPL ELPH-MCNC: 4.7 G/DL
ALBUMIN, RANDOM URINE: 20.3 MG/DL
ALP BLD-CCNC: 108 U/L
ALT SERPL-CCNC: 18 U/L
ANION GAP SERPL CALC-SCNC: 9 MMOL/L
AST SERPL-CCNC: 21 U/L
BILIRUB SERPL-MCNC: 0.8 MG/DL
BUN SERPL-MCNC: 25 MG/DL
CALCIUM SERPL-MCNC: 11 MG/DL
CHLORIDE SERPL-SCNC: 105 MMOL/L
CHOLEST SERPL-MCNC: 291 MG/DL
CO2 SERPL-SCNC: 26 MMOL/L
CREAT SERPL-MCNC: 1.68 MG/DL
CREAT SPEC-SCNC: 67 MG/DL
EGFRCR SERPLBLD CKD-EPI 2021: 30 ML/MIN/1.73M2
GLUCOSE SERPL-MCNC: 73 MG/DL
HDLC SERPL-MCNC: 58 MG/DL
LDLC SERPL-MCNC: 203 MG/DL
MICROALBUMIN/CREAT 24H UR-RTO: 304 MG/G
NONHDLC SERPL-MCNC: 233 MG/DL
POTASSIUM SERPL-SCNC: 5.9 MMOL/L
PROT SERPL-MCNC: 7.3 G/DL
SODIUM SERPL-SCNC: 140 MMOL/L
TRIGL SERPL-MCNC: 163 MG/DL

## 2025-07-15 PROCEDURE — 99202 OFFICE O/P NEW SF 15 MIN: CPT

## 2025-07-15 RX ORDER — SODIUM ZIRCONIUM CYCLOSILICATE 10 G/10G
10 POWDER, FOR SUSPENSION ORAL
Qty: 2 | Refills: 0 | Status: ACTIVE | COMMUNITY
Start: 2025-07-15 | End: 1900-01-01

## 2025-07-17 ENCOUNTER — APPOINTMENT (OUTPATIENT)
Dept: INTERNAL MEDICINE | Facility: CLINIC | Age: 80
End: 2025-07-17
Payer: MEDICARE

## 2025-07-17 VITALS
RESPIRATION RATE: 99 BRPM | TEMPERATURE: 97.3 F | HEART RATE: 86 BPM | WEIGHT: 169 LBS | DIASTOLIC BLOOD PRESSURE: 75 MMHG | HEIGHT: 63 IN | SYSTOLIC BLOOD PRESSURE: 147 MMHG | BODY MASS INDEX: 29.95 KG/M2

## 2025-07-17 PROCEDURE — 99214 OFFICE O/P EST MOD 30 MIN: CPT

## 2025-07-17 PROCEDURE — G2211 COMPLEX E/M VISIT ADD ON: CPT

## 2025-07-18 ENCOUNTER — NON-APPOINTMENT (OUTPATIENT)
Age: 80
End: 2025-07-18

## 2025-07-18 LAB
ANION GAP SERPL CALC-SCNC: 14 MMOL/L
BUN SERPL-MCNC: 32 MG/DL
CALCIUM SERPL-MCNC: 10.3 MG/DL
CHLORIDE SERPL-SCNC: 105 MMOL/L
CO2 SERPL-SCNC: 23 MMOL/L
CREAT SERPL-MCNC: 2.01 MG/DL
EGFRCR SERPLBLD CKD-EPI 2021: 24 ML/MIN/1.73M2
GLUCOSE SERPL-MCNC: 151 MG/DL
POTASSIUM SERPL-SCNC: 5.6 MMOL/L
SODIUM SERPL-SCNC: 142 MMOL/L

## 2025-07-22 ENCOUNTER — APPOINTMENT (OUTPATIENT)
Dept: HEART AND VASCULAR | Facility: CLINIC | Age: 80
End: 2025-07-22
Payer: MEDICARE

## 2025-07-22 VITALS
HEART RATE: 83 BPM | HEIGHT: 63 IN | BODY MASS INDEX: 30.48 KG/M2 | OXYGEN SATURATION: 95 % | WEIGHT: 172 LBS | DIASTOLIC BLOOD PRESSURE: 76 MMHG | TEMPERATURE: 97.6 F | SYSTOLIC BLOOD PRESSURE: 158 MMHG

## 2025-07-22 DIAGNOSIS — I50.30 UNSPECIFIED DIASTOLIC (CONGESTIVE) HEART FAILURE: ICD-10-CM

## 2025-07-22 PROCEDURE — G2211 COMPLEX E/M VISIT ADD ON: CPT

## 2025-07-22 PROCEDURE — 93000 ELECTROCARDIOGRAM COMPLETE: CPT

## 2025-07-22 PROCEDURE — 99214 OFFICE O/P EST MOD 30 MIN: CPT

## 2025-07-29 ENCOUNTER — APPOINTMENT (OUTPATIENT)
Dept: NEPHROLOGY | Facility: CLINIC | Age: 80
End: 2025-07-29
Payer: MEDICARE

## 2025-07-29 VITALS — SYSTOLIC BLOOD PRESSURE: 148 MMHG | HEART RATE: 88 BPM | DIASTOLIC BLOOD PRESSURE: 72 MMHG

## 2025-07-29 DIAGNOSIS — I10 ESSENTIAL (PRIMARY) HYPERTENSION: ICD-10-CM

## 2025-07-29 DIAGNOSIS — E78.5 HYPERLIPIDEMIA, UNSPECIFIED: ICD-10-CM

## 2025-07-29 DIAGNOSIS — E87.5 HYPERKALEMIA: ICD-10-CM

## 2025-07-29 DIAGNOSIS — N18.4 CHRONIC KIDNEY DISEASE, STAGE 4 (SEVERE): ICD-10-CM

## 2025-07-29 PROCEDURE — G2211 COMPLEX E/M VISIT ADD ON: CPT

## 2025-07-29 PROCEDURE — 99214 OFFICE O/P EST MOD 30 MIN: CPT

## 2025-08-22 ENCOUNTER — APPOINTMENT (OUTPATIENT)
Dept: HEART AND VASCULAR | Facility: CLINIC | Age: 80
End: 2025-08-22
Payer: MEDICARE

## 2025-08-22 VITALS — DIASTOLIC BLOOD PRESSURE: 69 MMHG | SYSTOLIC BLOOD PRESSURE: 170 MMHG

## 2025-08-22 VITALS
TEMPERATURE: 97.8 F | SYSTOLIC BLOOD PRESSURE: 168 MMHG | HEART RATE: 86 BPM | WEIGHT: 167 LBS | BODY MASS INDEX: 29.59 KG/M2 | OXYGEN SATURATION: 95 % | DIASTOLIC BLOOD PRESSURE: 76 MMHG | HEIGHT: 63 IN

## 2025-08-22 DIAGNOSIS — N18.30 CHRONIC KIDNEY DISEASE, STAGE 3 UNSPECIFIED: ICD-10-CM

## 2025-08-22 PROCEDURE — 99214 OFFICE O/P EST MOD 30 MIN: CPT

## 2025-08-22 PROCEDURE — 93000 ELECTROCARDIOGRAM COMPLETE: CPT

## 2025-08-22 PROCEDURE — G2211 COMPLEX E/M VISIT ADD ON: CPT

## 2025-08-22 RX ORDER — LABETALOL HYDROCHLORIDE 200 MG/1
200 TABLET, FILM COATED ORAL
Qty: 180 | Refills: 2 | Status: ACTIVE | COMMUNITY
Start: 2025-08-22 | End: 1900-01-01

## 2025-09-02 ENCOUNTER — APPOINTMENT (OUTPATIENT)
Dept: NEPHROLOGY | Facility: CLINIC | Age: 80
End: 2025-09-02
Payer: MEDICARE

## 2025-09-02 DIAGNOSIS — E87.5 HYPERKALEMIA: ICD-10-CM

## 2025-09-02 DIAGNOSIS — R80.9 PROTEINURIA, UNSPECIFIED: ICD-10-CM

## 2025-09-02 DIAGNOSIS — I50.20 UNSPECIFIED SYSTOLIC (CONGESTIVE) HEART FAILURE: ICD-10-CM

## 2025-09-02 DIAGNOSIS — E83.52 HYPERCALCEMIA: ICD-10-CM

## 2025-09-02 DIAGNOSIS — N18.4 CHRONIC KIDNEY DISEASE, STAGE 4 (SEVERE): ICD-10-CM

## 2025-09-02 DIAGNOSIS — I10 ESSENTIAL (PRIMARY) HYPERTENSION: ICD-10-CM

## 2025-09-02 PROCEDURE — 99214 OFFICE O/P EST MOD 30 MIN: CPT | Mod: 93

## (undated) DEVICE — PACK CENTURION 2.4MM

## (undated) DEVICE — DRAPE TOWEL WHITE 17" X 24"

## (undated) DEVICE — KNIFE ALCON PARACENTESIS CLEARCUT SIDEPORT 1MM (YELLOW)

## (undated) DEVICE — NUCLEUS HYDRODISSECTOR PEARCE ANGLED 25G X 22MM

## (undated) DEVICE — PREP CHLORAPREP HI-LITE ORANGE 26ML

## (undated) DEVICE — PACK ANTERIOR SEGMENT

## (undated) DEVICE — SOL IRR BAG BSS 500ML

## (undated) DEVICE — FOLEY TRAY 16FR 5CC LF UMETER CLOSED

## (undated) DEVICE — TOURNIQUET ARGYLE  VASCULAR KIT

## (undated) DEVICE — KIT CENTURION ANTERIOR

## (undated) DEVICE — SYR LUER LOK 1CC

## (undated) DEVICE — SUT SILK 4-0 18" TIES

## (undated) DEVICE — FLOW SWITCH

## (undated) DEVICE — MILOOP LENS MILOOP FRAGMENTATION DEVICE

## (undated) DEVICE — DRAPE 3/4 SHEET 52X76"

## (undated) DEVICE — SUT PROLENE 5-0 36" RB-1

## (undated) DEVICE — SUT PLEDGET SOFT 5/8" X 1/4" X 1/16" X4

## (undated) DEVICE — GLV 6.5 PROTEXIS (WHITE)

## (undated) DEVICE — APPLICATOR COTTON TIP 3" STERILE

## (undated) DEVICE — SUT NYLON 10-0 12" CU-5

## (undated) DEVICE — PACK ENDO VASCULAR LENOX HILL

## (undated) DEVICE — DRAPE IOBAN 33" X 23"

## (undated) DEVICE — Device

## (undated) DEVICE — FRAZIER SUCTION TIP 10FR

## (undated) DEVICE — TUBING TRUWAVE PRESSURE MALE/MALE 72"

## (undated) DEVICE — DRAPE IOBAN 13" X 13"

## (undated) DEVICE — SUT SILK 2-0 18" SH (POP-OFF)

## (undated) DEVICE — URETERAL CATH RED RUBBER 14FR (GREEN)

## (undated) DEVICE — KNIFE ALCON MVR V-LANCE 20G (WHITE)

## (undated) DEVICE — TRANSFORMER INTREPID I/A 0.3MM

## (undated) DEVICE — DRAPE MICROSCOPE KNOB COVER SMALL (2 PCS)

## (undated) DEVICE — URETERAL CATH RED RUBBER 12FR (WHITE)

## (undated) DEVICE — TORQUE DEVICE FOR GUIDEWIRE 0.0100.038"

## (undated) DEVICE — DRAPE MAYO STAND 30"

## (undated) DEVICE — SUT VICRYL 2-0 27" CT-1 UNDYED

## (undated) DEVICE — SUT SILK 2-0 12-18"

## (undated) DEVICE — SUT MONOCRYL 4-0 27" PS-2 UNDYED